# Patient Record
Sex: FEMALE | Race: WHITE | NOT HISPANIC OR LATINO | Employment: OTHER | URBAN - METROPOLITAN AREA
[De-identification: names, ages, dates, MRNs, and addresses within clinical notes are randomized per-mention and may not be internally consistent; named-entity substitution may affect disease eponyms.]

---

## 2018-12-17 ENCOUNTER — HOSPITAL ENCOUNTER (EMERGENCY)
Facility: HOSPITAL | Age: 63
Discharge: HOME | End: 2018-12-17
Attending: EMERGENCY MEDICINE | Admitting: EMERGENCY MEDICINE

## 2018-12-17 ENCOUNTER — APPOINTMENT (OUTPATIENT)
Dept: GENERAL RADIOLOGY | Facility: HOSPITAL | Age: 63
End: 2018-12-17

## 2018-12-17 ENCOUNTER — APPOINTMENT (OUTPATIENT)
Dept: CT IMAGING | Facility: HOSPITAL | Age: 63
End: 2018-12-17

## 2018-12-17 VITALS
TEMPERATURE: 97.8 F | HEIGHT: 63 IN | OXYGEN SATURATION: 95 % | DIASTOLIC BLOOD PRESSURE: 75 MMHG | BODY MASS INDEX: 28.35 KG/M2 | HEART RATE: 67 BPM | WEIGHT: 160 LBS | RESPIRATION RATE: 16 BRPM | SYSTOLIC BLOOD PRESSURE: 127 MMHG

## 2018-12-17 DIAGNOSIS — H81.09 MENIERE'S DISEASE, UNSPECIFIED LATERALITY: ICD-10-CM

## 2018-12-17 DIAGNOSIS — J20.9 BRONCHITIS WITH BRONCHOSPASM: Primary | ICD-10-CM

## 2018-12-17 LAB
ALBUMIN SERPL-MCNC: 4.55 G/DL (ref 3.2–4.8)
ALBUMIN/GLOB SERPL: 1.8 G/DL (ref 1.5–2.5)
ALP SERPL-CCNC: 54 U/L (ref 25–100)
ALT SERPL W P-5'-P-CCNC: 23 U/L (ref 7–40)
ANION GAP SERPL CALCULATED.3IONS-SCNC: 9 MMOL/L (ref 3–11)
AST SERPL-CCNC: 17 U/L (ref 0–33)
BASOPHILS # BLD AUTO: 0.05 10*3/MM3 (ref 0–0.2)
BASOPHILS NFR BLD AUTO: 0.4 % (ref 0–1)
BILIRUB SERPL-MCNC: 0.3 MG/DL (ref 0.3–1.2)
BNP SERPL-MCNC: 54 PG/ML (ref 0–100)
BUN BLD-MCNC: 20 MG/DL (ref 9–23)
BUN/CREAT SERPL: 29.9 (ref 7–25)
CALCIUM SPEC-SCNC: 9.4 MG/DL (ref 8.7–10.4)
CHLORIDE SERPL-SCNC: 104 MMOL/L (ref 99–109)
CO2 SERPL-SCNC: 25 MMOL/L (ref 20–31)
CREAT BLD-MCNC: 0.67 MG/DL (ref 0.6–1.3)
DEPRECATED RDW RBC AUTO: 41.2 FL (ref 37–54)
EOSINOPHIL # BLD AUTO: 0.01 10*3/MM3 (ref 0–0.3)
EOSINOPHIL NFR BLD AUTO: 0.1 % (ref 0–3)
ERYTHROCYTE [DISTWIDTH] IN BLOOD BY AUTOMATED COUNT: 12.2 % (ref 11.3–14.5)
GFR SERPL CREATININE-BSD FRML MDRD: 89 ML/MIN/1.73
GLOBULIN UR ELPH-MCNC: 2.6 GM/DL
GLUCOSE BLD-MCNC: 97 MG/DL (ref 70–100)
HCT VFR BLD AUTO: 40.3 % (ref 34.5–44)
HGB BLD-MCNC: 13 G/DL (ref 11.5–15.5)
HOLD SPECIMEN: NORMAL
HOLD SPECIMEN: NORMAL
IMM GRANULOCYTES # BLD: 0.1 10*3/MM3 (ref 0–0.03)
IMM GRANULOCYTES NFR BLD: 0.8 % (ref 0–0.6)
LYMPHOCYTES # BLD AUTO: 2.72 10*3/MM3 (ref 0.6–4.8)
LYMPHOCYTES NFR BLD AUTO: 20.8 % (ref 24–44)
MCH RBC QN AUTO: 30.2 PG (ref 27–31)
MCHC RBC AUTO-ENTMCNC: 32.3 G/DL (ref 32–36)
MCV RBC AUTO: 93.7 FL (ref 80–99)
MONOCYTES # BLD AUTO: 0.85 10*3/MM3 (ref 0–1)
MONOCYTES NFR BLD AUTO: 6.5 % (ref 0–12)
NEUTROPHILS # BLD AUTO: 9.44 10*3/MM3 (ref 1.5–8.3)
NEUTROPHILS NFR BLD AUTO: 72.2 % (ref 41–71)
PLATELET # BLD AUTO: 333 10*3/MM3 (ref 150–450)
PMV BLD AUTO: 11 FL (ref 6–12)
POTASSIUM BLD-SCNC: 4.4 MMOL/L (ref 3.5–5.5)
PROT SERPL-MCNC: 7.1 G/DL (ref 5.7–8.2)
RBC # BLD AUTO: 4.3 10*6/MM3 (ref 3.89–5.14)
SODIUM BLD-SCNC: 138 MMOL/L (ref 132–146)
TROPONIN I SERPL-MCNC: 0 NG/ML (ref 0–0.07)
WBC NRBC COR # BLD: 13.07 10*3/MM3 (ref 3.5–10.8)
WHOLE BLOOD HOLD SPECIMEN: NORMAL
WHOLE BLOOD HOLD SPECIMEN: NORMAL

## 2018-12-17 PROCEDURE — 0 IOPAMIDOL PER 1 ML: Performed by: EMERGENCY MEDICINE

## 2018-12-17 PROCEDURE — 80053 COMPREHEN METABOLIC PANEL: CPT | Performed by: EMERGENCY MEDICINE

## 2018-12-17 PROCEDURE — 93005 ELECTROCARDIOGRAM TRACING: CPT

## 2018-12-17 PROCEDURE — 84484 ASSAY OF TROPONIN QUANT: CPT

## 2018-12-17 PROCEDURE — 99284 EMERGENCY DEPT VISIT MOD MDM: CPT

## 2018-12-17 PROCEDURE — 93005 ELECTROCARDIOGRAM TRACING: CPT | Performed by: EMERGENCY MEDICINE

## 2018-12-17 PROCEDURE — 71045 X-RAY EXAM CHEST 1 VIEW: CPT

## 2018-12-17 PROCEDURE — 85025 COMPLETE CBC W/AUTO DIFF WBC: CPT

## 2018-12-17 PROCEDURE — 83880 ASSAY OF NATRIURETIC PEPTIDE: CPT | Performed by: EMERGENCY MEDICINE

## 2018-12-17 PROCEDURE — 71275 CT ANGIOGRAPHY CHEST: CPT

## 2018-12-17 PROCEDURE — 94640 AIRWAY INHALATION TREATMENT: CPT

## 2018-12-17 RX ORDER — IPRATROPIUM BROMIDE AND ALBUTEROL SULFATE 2.5; .5 MG/3ML; MG/3ML
3 SOLUTION RESPIRATORY (INHALATION) ONCE
Status: COMPLETED | OUTPATIENT
Start: 2018-12-17 | End: 2018-12-17

## 2018-12-17 RX ORDER — SODIUM CHLORIDE 0.9 % (FLUSH) 0.9 %
10 SYRINGE (ML) INJECTION AS NEEDED
Status: DISCONTINUED | OUTPATIENT
Start: 2018-12-17 | End: 2018-12-17 | Stop reason: HOSPADM

## 2018-12-17 RX ADMIN — IOPAMIDOL 80 ML: 755 INJECTION, SOLUTION INTRAVENOUS at 13:30

## 2018-12-17 RX ADMIN — IPRATROPIUM BROMIDE AND ALBUTEROL SULFATE 3 ML: 2.5; .5 SOLUTION RESPIRATORY (INHALATION) at 12:59

## 2018-12-17 NOTE — ED PROVIDER NOTES
Jonathan Barriga is a 63 y.o. female who presents to the emergency department with complaints of SOA that started 6 days ago that has increased since onset. The patient states that she developed a cough and was seen at the clinic where she was prescribed Doxycycline, Albuterol inhaler, and steroids. She has noticed that the Albuterol inhaler has improved her coughing. The patient mentions that she has a sore throat. She has experienced tightness in her chest from her SOA that started yesterday that has been constant since onset. She had a similar episode of SOA 5 months ago where she was prescribed Doxycycline that she feels did not help at that time. She has a PMHx of uterine cancer that she was diagnosed with in 2015, and had surgery to remove the cancer. She did not receive any chemotherapy, or radiation. She denies any exposure to fumes, but does report a history of second hand smoke exposure. She denies any chest wall tenderness, bilateral lower extremity edema, fever, nausea, and any other acute symptoms at this time.         History provided by:  Patient  Shortness of Breath   Severity:  Moderate  Onset quality:  Gradual  Duration:  6 days  Timing:  Constant  Progression:  Worsening  Chronicity:  New  Relieved by:  Inhaler  Worsened by:  Emotional stress and coughing  Associated symptoms: chest pain, cough and sore throat    Associated symptoms: no fever    Risk factors: hx of cancer and obesity        Review of Systems   Constitutional: Negative for fever.   HENT: Positive for sore throat.    Respiratory: Positive for cough and shortness of breath.    Cardiovascular: Positive for chest pain.   Gastrointestinal: Negative for nausea.   Musculoskeletal:        No edema    All other systems reviewed and are negative.      Past Medical History:   Diagnosis Date   • Disease of thyroid gland    • History of uterine cancer        Allergies   Allergen Reactions   • Penicillins Hives       Past Surgical  History:   Procedure Laterality Date   • TOTAL ABDOMINAL HYSTERECTOMY WITH SALPINGO OOPHORECTOMY  2015       History reviewed. No pertinent family history.    Social History     Socioeconomic History   • Marital status:      Spouse name: Not on file   • Number of children: Not on file   • Years of education: Not on file   • Highest education level: Not on file   Tobacco Use   • Smoking status: Never Smoker   • Smokeless tobacco: Never Used   • Tobacco comment: second hand smoke exposure   Substance and Sexual Activity   • Alcohol use: No   • Drug use: No         Objective   Physical Exam   Constitutional: She is oriented to person, place, and time. She appears well-developed and well-nourished. No distress.   The patient is a pleasant emotional female with a slightly high pitched voice.    HENT:   Head: Normocephalic and atraumatic.   Eyes: Conjunctivae are normal. No scleral icterus.   Neck: Normal range of motion. Neck supple.   Cardiovascular: Normal rate, regular rhythm and normal heart sounds.   No murmur heard.  Pulmonary/Chest: Effort normal and breath sounds normal. No respiratory distress. She has no wheezes. She has no rales.   Abdominal: Soft. There is no tenderness. There is no guarding.   Musculoskeletal: Normal range of motion.   Neurological: She is alert and oriented to person, place, and time.   Skin: Skin is warm and dry. She is not diaphoretic.   Psychiatric: She has a normal mood and affect. Her behavior is normal.   Nursing note and vitals reviewed.      Procedures         ED Course  ED Course as of Dec 17 1547   Mon Dec 17, 2018   1412 She feels remarkably better after the Duoneb.  Her voice is even improved.  Will add ipratropium to her albuterol inhaler.  Spacer device.  Increase albuterol to qid from bid.  Can stop prednisone.  [LI]      ED Course User Index  [LI] Bijan Chavis MD     Recent Results (from the past 24 hour(s))   Comprehensive Metabolic Panel    Collection Time:  12/17/18 11:59 AM   Result Value Ref Range    Glucose 97 70 - 100 mg/dL    BUN 20 9 - 23 mg/dL    Creatinine 0.67 0.60 - 1.30 mg/dL    Sodium 138 132 - 146 mmol/L    Potassium 4.4 3.5 - 5.5 mmol/L    Chloride 104 99 - 109 mmol/L    CO2 25.0 20.0 - 31.0 mmol/L    Calcium 9.4 8.7 - 10.4 mg/dL    Total Protein 7.1 5.7 - 8.2 g/dL    Albumin 4.55 3.20 - 4.80 g/dL    ALT (SGPT) 23 7 - 40 U/L    AST (SGOT) 17 0 - 33 U/L    Alkaline Phosphatase 54 25 - 100 U/L    Total Bilirubin 0.3 0.3 - 1.2 mg/dL    eGFR Non African Amer 89 >60 mL/min/1.73    Globulin 2.6 gm/dL    A/G Ratio 1.8 1.5 - 2.5 g/dL    BUN/Creatinine Ratio 29.9 (H) 7.0 - 25.0    Anion Gap 9.0 3.0 - 11.0 mmol/L   BNP    Collection Time: 12/17/18 11:59 AM   Result Value Ref Range    BNP 54.0 0.0 - 100.0 pg/mL   Light Blue Top    Collection Time: 12/17/18 11:59 AM   Result Value Ref Range    Extra Tube hold for add-on    Green Top (Gel)    Collection Time: 12/17/18 11:59 AM   Result Value Ref Range    Extra Tube Hold for add-ons.    Lavender Top    Collection Time: 12/17/18 11:59 AM   Result Value Ref Range    Extra Tube hold for add-on    Gold Top - SST    Collection Time: 12/17/18 11:59 AM   Result Value Ref Range    Extra Tube Hold for add-ons.    CBC Auto Differential    Collection Time: 12/17/18 11:59 AM   Result Value Ref Range    WBC 13.07 (H) 3.50 - 10.80 10*3/mm3    RBC 4.30 3.89 - 5.14 10*6/mm3    Hemoglobin 13.0 11.5 - 15.5 g/dL    Hematocrit 40.3 34.5 - 44.0 %    MCV 93.7 80.0 - 99.0 fL    MCH 30.2 27.0 - 31.0 pg    MCHC 32.3 32.0 - 36.0 g/dL    RDW 12.2 11.3 - 14.5 %    RDW-SD 41.2 37.0 - 54.0 fl    MPV 11.0 6.0 - 12.0 fL    Platelets 333 150 - 450 10*3/mm3    Neutrophil % 72.2 (H) 41.0 - 71.0 %    Lymphocyte % 20.8 (L) 24.0 - 44.0 %    Monocyte % 6.5 0.0 - 12.0 %    Eosinophil % 0.1 0.0 - 3.0 %    Basophil % 0.4 0.0 - 1.0 %    Immature Grans % 0.8 (H) 0.0 - 0.6 %    Neutrophils, Absolute 9.44 (H) 1.50 - 8.30 10*3/mm3    Lymphocytes, Absolute 2.72 0.60  - 4.80 10*3/mm3    Monocytes, Absolute 0.85 0.00 - 1.00 10*3/mm3    Eosinophils, Absolute 0.01 0.00 - 0.30 10*3/mm3    Basophils, Absolute 0.05 0.00 - 0.20 10*3/mm3    Immature Grans, Absolute 0.10 (H) 0.00 - 0.03 10*3/mm3   POC Troponin, Rapid    Collection Time: 12/17/18 12:05 PM   Result Value Ref Range    Troponin I 0.00 0.00 - 0.07 ng/mL     Note: In addition to lab results from this visit, the labs listed above may include labs taken at another facility or during a different encounter within the last 24 hours. Please correlate lab times with ED admission and discharge times for further clarification of the services performed during this visit.    XR Chest 1 View   Final Result   No acute cardiopulmonary disease.       D:  12/17/2018   E:  12/17/2018       This report was finalized on 12/17/2018 3:23 PM by Dr. Glory Chaidez MD.          CT Angiogram Chest With & Without Contrast    (Results Pending)     Vitals:    12/17/18 1230 12/17/18 1259 12/17/18 1300 12/17/18 1419   BP: 147/81  114/74 127/75   BP Location:       Pulse: 75 61 61 67   Resp:    16   Temp:       TempSrc:       SpO2: 96% 99% 99% 95%   Weight:       Height:         Medications   sodium chloride 0.9 % flush 10 mL (not administered)   ipratropium-albuterol (DUO-NEB) nebulizer solution 3 mL (3 mL Nebulization Given 12/17/18 1259)   iopamidol (ISOVUE-370) 76 % injection 100 mL (80 mL Intravenous Given 12/17/18 1330)     ECG/EMG Results (last 24 hours)     Procedure Component Value Units Date/Time    ECG 12 Lead [[id number]] Collected:  12/17/18 1158     Updated:  12/17/18 1219    Narrative:       Test Reason : SOA Protocol  Blood Pressure : **/** mmHG  Vent. Rate : 065 BPM     Atrial Rate : 065 BPM     P-R Int : 142 ms          QRS Dur : 082 ms      QT Int : 370 ms       P-R-T Axes : 034 033 034 degrees     QTc Int : 384 ms    Normal sinus rhythm  Normal ECG  No previous ECGs available  Confirmed by HILARY JACOBS MD ([id number]) on 12/17/2018  12:19:06 PM    Referred By:  EDMD           Confirmed By:HILARY CHAVIS MD                        [facility]    Final diagnoses:   Bronchitis with bronchospasm   Meniere's disease, unspecified laterality       Documentation assistance provided by elvia Torres.  Information recorded by the scribe was done at my direction and has been verified and validated by me.     Jessica Torres  12/17/18 9139       Hilary Chavis MD  12/17/18 5558

## 2018-12-17 NOTE — DISCHARGE INSTRUCTIONS
Follow up with Dr. Salvador for Meniere's Disease     Use albuterol 4 times a day along with the ipratropium.  Gradually decrease use after breathing returns to normal.     Return to ER if any worsening or new symptoms occur.     Follow up with one of the [facility] Primary Care Providers below to setup primary care. If you need assistance coordinating a primary care appointment with a [facility] Primary Care Provider, please contact the Primary Care Coordinators at [phone] for appointment scheduling.    [facility], Primary Care   [address] , [address]   [place] [address]  [phone]    [facility] Internal Medicine & Endocrinology  [address], [address]  [place] [address] [phone]    [facility] Family Medicine  [address], [address]   [place], [place] [id number] [phone]    [facility] Primary Care  [address], [address]  [place] [address]  [phone]    [facility], Primary Care,   [address], [address]   [place] [address]  [phone]    [facility]  [address], [address]  [place], [place] [address] [phone]    [facility], Primary Care  [address], [address]  [place], [place] [id number] [phone]    [facility] Internal Medicine & Pediatrics  [address], [address]   [place] [address] [phone]    [facility], Primary Care  [address] [place] [address] [phone]      [facility]  [address], [address]   [place] [address] [phone]    [facility] Family Medicine  [address] [place] [address] [place] [address] [phone]

## 2019-03-05 ENCOUNTER — HOSPITAL ENCOUNTER (EMERGENCY)
Facility: HOSPITAL | Age: 64
Discharge: HOME | End: 2019-03-05
Attending: EMERGENCY MEDICINE | Admitting: EMERGENCY MEDICINE

## 2019-03-05 ENCOUNTER — APPOINTMENT (OUTPATIENT)
Dept: CT IMAGING | Facility: HOSPITAL | Age: 64
End: 2019-03-05

## 2019-03-05 VITALS
RESPIRATION RATE: 18 BRPM | SYSTOLIC BLOOD PRESSURE: 130 MMHG | TEMPERATURE: 98.1 F | WEIGHT: 150 LBS | HEIGHT: 63 IN | DIASTOLIC BLOOD PRESSURE: 78 MMHG | OXYGEN SATURATION: 96 % | BODY MASS INDEX: 26.58 KG/M2 | HEART RATE: 78 BPM

## 2019-03-05 DIAGNOSIS — K57.32 SIGMOID DIVERTICULITIS: Primary | ICD-10-CM

## 2019-03-05 LAB
ALBUMIN SERPL-MCNC: 4.45 G/DL (ref 3.2–4.8)
ALBUMIN/GLOB SERPL: 1.6 G/DL (ref 1.5–2.5)
ALP SERPL-CCNC: 65 U/L (ref 25–100)
ALT SERPL W P-5'-P-CCNC: 14 U/L (ref 7–40)
ANION GAP SERPL CALCULATED.3IONS-SCNC: 7 MMOL/L (ref 3–11)
AST SERPL-CCNC: 14 U/L (ref 0–33)
BASOPHILS # BLD AUTO: 0.03 10*3/MM3 (ref 0–0.2)
BASOPHILS NFR BLD AUTO: 0.3 % (ref 0–1)
BILIRUB SERPL-MCNC: 0.4 MG/DL (ref 0.3–1.2)
BILIRUB UR QL STRIP: NEGATIVE
BUN BLD-MCNC: 14 MG/DL (ref 9–23)
BUN/CREAT SERPL: 17.9 (ref 7–25)
CALCIUM SPEC-SCNC: 9.5 MG/DL (ref 8.7–10.4)
CHLORIDE SERPL-SCNC: 102 MMOL/L (ref 99–109)
CLARITY UR: CLEAR
CO2 SERPL-SCNC: 28 MMOL/L (ref 20–31)
COLOR UR: YELLOW
CREAT BLD-MCNC: 0.78 MG/DL (ref 0.6–1.3)
D-LACTATE SERPL-SCNC: 1.1 MMOL/L (ref 0.5–2)
DEPRECATED RDW RBC AUTO: 41.4 FL (ref 37–54)
EOSINOPHIL # BLD AUTO: 0.24 10*3/MM3 (ref 0–0.3)
EOSINOPHIL NFR BLD AUTO: 2.3 % (ref 0–3)
ERYTHROCYTE [DISTWIDTH] IN BLOOD BY AUTOMATED COUNT: 12.2 % (ref 11.3–14.5)
GFR SERPL CREATININE-BSD FRML MDRD: 75 ML/MIN/1.73
GLOBULIN UR ELPH-MCNC: 2.8 GM/DL
GLUCOSE BLD-MCNC: 89 MG/DL (ref 70–100)
GLUCOSE UR STRIP-MCNC: NEGATIVE MG/DL
HCT VFR BLD AUTO: 39.4 % (ref 34.5–44)
HGB BLD-MCNC: 12.6 G/DL (ref 11.5–15.5)
HGB UR QL STRIP.AUTO: NEGATIVE
HOLD SPECIMEN: NORMAL
HOLD SPECIMEN: NORMAL
IMM GRANULOCYTES # BLD AUTO: 0.02 10*3/MM3 (ref 0–0.05)
IMM GRANULOCYTES NFR BLD AUTO: 0.2 % (ref 0–0.6)
KETONES UR QL STRIP: NEGATIVE
LEUKOCYTE ESTERASE UR QL STRIP.AUTO: NEGATIVE
LIPASE SERPL-CCNC: 32 U/L (ref 6–51)
LYMPHOCYTES # BLD AUTO: 3.42 10*3/MM3 (ref 0.6–4.8)
LYMPHOCYTES NFR BLD AUTO: 32.7 % (ref 24–44)
MCH RBC QN AUTO: 29.9 PG (ref 27–31)
MCHC RBC AUTO-ENTMCNC: 32 G/DL (ref 32–36)
MCV RBC AUTO: 93.4 FL (ref 80–99)
MONOCYTES # BLD AUTO: 0.96 10*3/MM3 (ref 0–1)
MONOCYTES NFR BLD AUTO: 9.2 % (ref 0–12)
NEUTROPHILS # BLD AUTO: 5.81 10*3/MM3 (ref 1.5–8.3)
NEUTROPHILS NFR BLD AUTO: 55.5 % (ref 41–71)
NITRITE UR QL STRIP: NEGATIVE
PH UR STRIP.AUTO: 8.5 [PH] (ref 5–8)
PLATELET # BLD AUTO: 274 10*3/MM3 (ref 150–450)
PMV BLD AUTO: 11.6 FL (ref 6–12)
POTASSIUM BLD-SCNC: 3.9 MMOL/L (ref 3.5–5.5)
PROT SERPL-MCNC: 7.2 G/DL (ref 5.7–8.2)
PROT UR QL STRIP: NEGATIVE
RBC # BLD AUTO: 4.22 10*6/MM3 (ref 3.89–5.14)
SODIUM BLD-SCNC: 137 MMOL/L (ref 132–146)
SP GR UR STRIP: 1.01 (ref 1–1.03)
UROBILINOGEN UR QL STRIP: ABNORMAL
WBC NRBC COR # BLD: 10.46 10*3/MM3 (ref 3.5–10.8)
WHOLE BLOOD HOLD SPECIMEN: NORMAL
WHOLE BLOOD HOLD SPECIMEN: NORMAL

## 2019-03-05 PROCEDURE — 83605 ASSAY OF LACTIC ACID: CPT | Performed by: EMERGENCY MEDICINE

## 2019-03-05 PROCEDURE — 80053 COMPREHEN METABOLIC PANEL: CPT | Performed by: EMERGENCY MEDICINE

## 2019-03-05 PROCEDURE — 25010000002 ONDANSETRON PER 1 MG: Performed by: EMERGENCY MEDICINE

## 2019-03-05 PROCEDURE — 85025 COMPLETE CBC W/AUTO DIFF WBC: CPT | Performed by: EMERGENCY MEDICINE

## 2019-03-05 PROCEDURE — 25010000002 IOPAMIDOL 61 % SOLUTION: Performed by: EMERGENCY MEDICINE

## 2019-03-05 PROCEDURE — 74177 CT ABD & PELVIS W/CONTRAST: CPT

## 2019-03-05 PROCEDURE — 83690 ASSAY OF LIPASE: CPT | Performed by: EMERGENCY MEDICINE

## 2019-03-05 PROCEDURE — 99284 EMERGENCY DEPT VISIT MOD MDM: CPT

## 2019-03-05 PROCEDURE — 96361 HYDRATE IV INFUSION ADD-ON: CPT

## 2019-03-05 PROCEDURE — 81003 URINALYSIS AUTO W/O SCOPE: CPT | Performed by: EMERGENCY MEDICINE

## 2019-03-05 PROCEDURE — 25010000002 HYDROMORPHONE PER 4 MG: Performed by: EMERGENCY MEDICINE

## 2019-03-05 PROCEDURE — 96374 THER/PROPH/DIAG INJ IV PUSH: CPT

## 2019-03-05 RX ORDER — FOLIC ACID 0.8 MG
TABLET ORAL
COMMUNITY
End: 2019-04-22

## 2019-03-05 RX ORDER — HYDROCODONE BITARTRATE AND ACETAMINOPHEN 5; 325 MG/1; MG/1
1 TABLET ORAL EVERY 6 HOURS PRN
Qty: 12 TABLET | Refills: 0 | Status: SHIPPED | OUTPATIENT
Start: 2019-03-05 | End: 2019-04-22

## 2019-03-05 RX ORDER — LEVOFLOXACIN 5 MG/ML
500 INJECTION, SOLUTION INTRAVENOUS ONCE
Status: DISCONTINUED | OUTPATIENT
Start: 2019-03-05 | End: 2019-03-05

## 2019-03-05 RX ORDER — CIPROFLOXACIN 500 MG/1
500 TABLET, FILM COATED ORAL 2 TIMES DAILY
Qty: 20 TABLET | Refills: 0 | Status: SHIPPED | OUTPATIENT
Start: 2019-03-05 | End: 2019-04-22

## 2019-03-05 RX ORDER — METRONIDAZOLE 500 MG/1
500 TABLET ORAL ONCE
Status: COMPLETED | OUTPATIENT
Start: 2019-03-05 | End: 2019-03-05

## 2019-03-05 RX ORDER — METRONIDAZOLE 500 MG/1
500 TABLET ORAL 2 TIMES DAILY
Qty: 14 TABLET | Refills: 0 | Status: SHIPPED | OUTPATIENT
Start: 2019-03-05 | End: 2019-04-22

## 2019-03-05 RX ORDER — ONDANSETRON 4 MG/1
4 TABLET, ORALLY DISINTEGRATING ORAL EVERY 8 HOURS PRN
Qty: 14 TABLET | Refills: 0 | Status: SHIPPED | OUTPATIENT
Start: 2019-03-05 | End: 2019-04-22

## 2019-03-05 RX ORDER — ONDANSETRON 2 MG/ML
4 INJECTION INTRAMUSCULAR; INTRAVENOUS ONCE
Status: COMPLETED | OUTPATIENT
Start: 2019-03-05 | End: 2019-03-05

## 2019-03-05 RX ORDER — LEVOFLOXACIN 500 MG/1
500 TABLET, FILM COATED ORAL ONCE
Status: COMPLETED | OUTPATIENT
Start: 2019-03-05 | End: 2019-03-05

## 2019-03-05 RX ORDER — HYDROMORPHONE HYDROCHLORIDE 1 MG/ML
0.5 INJECTION, SOLUTION INTRAMUSCULAR; INTRAVENOUS; SUBCUTANEOUS ONCE
Status: DISCONTINUED | OUTPATIENT
Start: 2019-03-05 | End: 2019-03-05 | Stop reason: HOSPADM

## 2019-03-05 RX ORDER — SODIUM CHLORIDE 0.9 % (FLUSH) 0.9 %
10 SYRINGE (ML) INJECTION AS NEEDED
Status: DISCONTINUED | OUTPATIENT
Start: 2019-03-05 | End: 2019-03-05 | Stop reason: HOSPADM

## 2019-03-05 RX ADMIN — METRONIDAZOLE 500 MG: 500 TABLET ORAL at 16:01

## 2019-03-05 RX ADMIN — ONDANSETRON 4 MG: 2 INJECTION INTRAMUSCULAR; INTRAVENOUS at 12:46

## 2019-03-05 RX ADMIN — SODIUM CHLORIDE 1000 ML: 9 INJECTION, SOLUTION INTRAVENOUS at 12:47

## 2019-03-05 RX ADMIN — IOPAMIDOL 95 ML: 612 INJECTION, SOLUTION INTRAVENOUS at 13:11

## 2019-03-05 RX ADMIN — LEVOFLOXACIN 500 MG: 500 TABLET, FILM COATED ORAL at 16:02

## 2019-03-05 NOTE — DISCHARGE INSTRUCTIONS
Medications as directed.  Follow-up with colorectal surgery.  Call tomorrow for an appointment within a few days.  Medications as directed.  Return to the emergency department as needed for worsening symptoms or concerns including, but are not limited to: Fever, intractable pain, or intractable vomiting.  Thank you

## 2019-03-05 NOTE — ED PROVIDER NOTES
Subjective   Iliana Barriga is a 63 y.o.female who presents to the emergency department with complaints of abdominal pain. In 2015 she was diagnosed with uterine cancer and underwent a total hysterectomy. At the same time she had a bladder neck suspension. She feels that her bladder has fallen since then because she has had ongoing fullness in her abdomen for about a year which she has been intermittently treating with simethicone. About a week ago she felt two sharp, shooting pains in her lumbar back while passing a bowel movement. Her back pain resolved although a few days later she started having lower abdominal pain and trouble with constipation. She did finally have a large, liquid bowel movement after using several fleet enemas and glycerin suppositories but is still having daily lower abdominal pain. She last passed liquid stool this morning at 1000. There are no other acute complaints at this time.        History provided by:  Patient  Abdominal Pain   Pain location:  RLQ, LLQ and suprapubic  Pain quality: aching    Pain radiates to:  Does not radiate  Pain severity:  Moderate  Onset quality:  Sudden  Duration:  4 days  Timing:  Constant  Progression:  Unchanged  Chronicity:  New  Relieved by:  Nothing  Worsened by:  Nothing  Ineffective treatments:  Bowel activity  Associated symptoms: constipation    Associated symptoms: no hematochezia and no melena        Review of Systems   Gastrointestinal: Positive for abdominal pain and constipation. Negative for hematochezia and melena.   Musculoskeletal: Negative for back pain.   All other systems reviewed and are negative.      Past Medical History:   Diagnosis Date   • Disease of thyroid gland    • History of uterine cancer        Allergies   Allergen Reactions   • Penicillins Hives       Past Surgical History:   Procedure Laterality Date   • BLADDER SUSPENSION     • TOTAL ABDOMINAL HYSTERECTOMY WITH SALPINGO OOPHORECTOMY  2015       History reviewed. No  pertinent family history.    Social History     Socioeconomic History   • Marital status:      Spouse name: Not on file   • Number of children: Not on file   • Years of education: Not on file   • Highest education level: Not on file   Tobacco Use   • Smoking status: Never Smoker   • Smokeless tobacco: Never Used   Substance and Sexual Activity   • Alcohol use: No   • Drug use: No         Objective   Physical Exam   Constitutional: She is oriented to person, place, and time. She appears well-developed and well-nourished. No distress.   HENT:   Head: Normocephalic and atraumatic.   Eyes: Conjunctivae are normal. No scleral icterus.   Neck: Normal range of motion. Neck supple.   Cardiovascular: Normal rate, regular rhythm and normal heart sounds.   No murmur heard.  Pulmonary/Chest: Effort normal and breath sounds normal. No respiratory distress.   Abdominal: Soft. Normal appearance and bowel sounds are normal. There is tenderness in the right lower quadrant, suprapubic area and left lower quadrant.   She has mild diffuse tenderness in the lower abdomen.   Musculoskeletal: She exhibits no edema.   Neurological: She is alert and oriented to person, place, and time.   Skin: Skin is warm and dry. No erythema.   Psychiatric: She has a normal mood and affect. Her behavior is normal.   Nursing note and vitals reviewed.      Procedures         ED Course  ED Course as of Mar 05 1534   Tue Mar 05, 2019   1510 Patient has a normal white count.  Her pain is controlled with medication.  CT is negative for perforation.  Will give first doses of antibiotic measures here and then discharged to outpatient treatment referral.  She concurs with outpatient plan of care and understands parameters for concern that would warrant return to the emergency department.  [ML]      ED Course User Index  [ML] Princess Arrington APRN     Recent Results (from the past 24 hour(s))   Comprehensive Metabolic Panel    Collection Time: 03/05/19  11:57 AM   Result Value Ref Range    Glucose 89 70 - 100 mg/dL    BUN 14 9 - 23 mg/dL    Creatinine 0.78 0.60 - 1.30 mg/dL    Sodium 137 132 - 146 mmol/L    Potassium 3.9 3.5 - 5.5 mmol/L    Chloride 102 99 - 109 mmol/L    CO2 28.0 20.0 - 31.0 mmol/L    Calcium 9.5 8.7 - 10.4 mg/dL    Total Protein 7.2 5.7 - 8.2 g/dL    Albumin 4.45 3.20 - 4.80 g/dL    ALT (SGPT) 14 7 - 40 U/L    AST (SGOT) 14 0 - 33 U/L    Alkaline Phosphatase 65 25 - 100 U/L    Total Bilirubin 0.4 0.3 - 1.2 mg/dL    eGFR Non African Amer 75 >60 mL/min/1.73    Globulin 2.8 gm/dL    A/G Ratio 1.6 1.5 - 2.5 g/dL    BUN/Creatinine Ratio 17.9 7.0 - 25.0    Anion Gap 7.0 3.0 - 11.0 mmol/L   Lipase    Collection Time: 03/05/19 11:57 AM   Result Value Ref Range    Lipase 32 6 - 51 U/L   Lactic Acid, Plasma    Collection Time: 03/05/19 11:57 AM   Result Value Ref Range    Lactate 1.1 0.5 - 2.0 mmol/L   Light Blue Top    Collection Time: 03/05/19 11:57 AM   Result Value Ref Range    Extra Tube hold for add-on    Green Top (Gel)    Collection Time: 03/05/19 11:57 AM   Result Value Ref Range    Extra Tube Hold for add-ons.    Lavender Top    Collection Time: 03/05/19 11:57 AM   Result Value Ref Range    Extra Tube hold for add-on    Gold Top - SST    Collection Time: 03/05/19 11:57 AM   Result Value Ref Range    Extra Tube Hold for add-ons.    CBC Auto Differential    Collection Time: 03/05/19 11:57 AM   Result Value Ref Range    WBC 10.46 3.50 - 10.80 10*3/mm3    RBC 4.22 3.89 - 5.14 10*6/mm3    Hemoglobin 12.6 11.5 - 15.5 g/dL    Hematocrit 39.4 34.5 - 44.0 %    MCV 93.4 80.0 - 99.0 fL    MCH 29.9 27.0 - 31.0 pg    MCHC 32.0 32.0 - 36.0 g/dL    RDW 12.2 11.3 - 14.5 %    RDW-SD 41.4 37.0 - 54.0 fl    MPV 11.6 6.0 - 12.0 fL    Platelets 274 150 - 450 10*3/mm3    Neutrophil % 55.5 41.0 - 71.0 %    Lymphocyte % 32.7 24.0 - 44.0 %    Monocyte % 9.2 0.0 - 12.0 %    Eosinophil % 2.3 0.0 - 3.0 %    Basophil % 0.3 0.0 - 1.0 %    Immature Grans % 0.2 0.0 - 0.6 %     "Neutrophils, Absolute 5.81 1.50 - 8.30 10*3/mm3    Lymphocytes, Absolute 3.42 0.60 - 4.80 10*3/mm3    Monocytes, Absolute 0.96 0.00 - 1.00 10*3/mm3    Eosinophils, Absolute 0.24 0.00 - 0.30 10*3/mm3    Basophils, Absolute 0.03 0.00 - 0.20 10*3/mm3    Immature Grans, Absolute 0.02 0.00 - 0.05 10*3/mm3   Urinalysis With Microscopic If Indicated (No Culture) - Urine, Clean Catch    Collection Time: 03/05/19 11:58 AM   Result Value Ref Range    Color, UA Yellow Yellow, Straw    Appearance, UA Clear Clear    pH, UA 8.5 (H) 5.0 - 8.0    Specific Gravity, UA 1.011 1.001 - 1.030    Glucose, UA Negative Negative    Ketones, UA Negative Negative    Bilirubin, UA Negative Negative    Blood, UA Negative Negative    Protein, UA Negative Negative    Leuk Esterase, UA Negative Negative    Nitrite, UA Negative Negative    Urobilinogen, UA 0.2 E.U./dL 0.2 - 1.0 E.U./dL     Note: In addition to lab results from this visit, the labs listed above may include labs taken at another facility or during a different encounter within the last 24 hours. Please correlate lab times with ED admission and discharge times for further clarification of the services performed during this visit.    CT Abdomen Pelvis With Contrast   Preliminary Result   Marked inflammation in the distal sigmoid colon consistent   with acute diverticulitis.       D:  03/05/2019   E:  03/05/2019                    Vitals:    03/05/19 1135 03/05/19 1407   BP: 120/80 120/84   BP Location: Left arm Left arm   Patient Position: Sitting Lying   Pulse: 76 78   Resp: 18 18   Temp: 98.1 °F (36.7 °C) 98.1 °F (36.7 °C)   TempSrc: Oral Oral   SpO2: 95% 95%   Weight: 68 kg (150 lb)    Height: 160 cm (63\")      Medications   sodium chloride 0.9 % flush 10 mL (not administered)   HYDROmorphone (DILAUDID) injection 0.5 mg (0.5 mg Intravenous Not Given 3/5/19 1246)   levoFLOXacin (LEVAQUIN) 500 mg/100 mL D5W (premix) (LEVAQUIN) 500 mg (not administered)   metroNIDAZOLE (FLAGYL) tablet " 500 mg (not administered)   sodium chloride 0.9 % bolus 1,000 mL (1,000 mL Intravenous New Bag 3/5/19 1247)   ondansetron (ZOFRAN) injection 4 mg (4 mg Intravenous Given 3/5/19 1246)   iopamidol (ISOVUE-300) 61 % injection 100 mL (95 mL Intravenous Given 3/5/19 1311)     ECG/EMG Results (last 24 hours)     ** No results found for the last 24 hours. **        No orders to display                      MDM    Final diagnoses:   Sigmoid diverticulitis       Documentation assistance provided by elvia Jordan.  Information recorded by the elvia was done at my direction and has been verified and validated by me.     Юлия Jordan  03/05/19 1302       Princess Arrington APRN  03/05/19 8339

## 2019-03-25 ENCOUNTER — TRANSCRIBE ORDERS (OUTPATIENT)
Dept: ADMINISTRATIVE | Facility: HOSPITAL | Age: 64
End: 2019-03-25

## 2019-03-25 DIAGNOSIS — K57.92 DIVERTICULITIS: Primary | ICD-10-CM

## 2019-03-28 ENCOUNTER — HOSPITAL ENCOUNTER (OUTPATIENT)
Dept: GENERAL RADIOLOGY | Facility: HOSPITAL | Age: 64
Discharge: HOME | End: 2019-03-28
Admitting: COLON & RECTAL SURGERY

## 2019-03-28 DIAGNOSIS — K57.92 DIVERTICULITIS: ICD-10-CM

## 2019-03-28 PROCEDURE — 74270 X-RAY XM COLON 1CNTRST STD: CPT

## 2019-03-28 PROCEDURE — 0 DIATRIZOATE MEGLUMINE & SODIUM PER 1 ML: Performed by: COLON & RECTAL SURGERY

## 2019-03-28 RX ADMIN — DIATRIZOATE MEGLUMINE AND DIATRIZOATE SODIUM 480 ML: 660; 100 LIQUID ORAL; RECTAL at 11:11

## 2019-04-08 PROBLEM — Z01.419 WELL WOMAN EXAM: Status: ACTIVE | Noted: 2019-04-08

## 2019-04-22 ENCOUNTER — OFFICE VISIT (OUTPATIENT)
Dept: OBSTETRICS AND GYNECOLOGY | Facility: CLINIC | Age: 64
End: 2019-04-22

## 2019-04-22 VITALS
SYSTOLIC BLOOD PRESSURE: 124 MMHG | BODY MASS INDEX: 30.11 KG/M2 | WEIGHT: 170 LBS | DIASTOLIC BLOOD PRESSURE: 80 MMHG | RESPIRATION RATE: 14 BRPM

## 2019-04-22 DIAGNOSIS — N81.6 CYSTOCELE WITH RECTOCELE: Primary | ICD-10-CM

## 2019-04-22 DIAGNOSIS — N81.10 CYSTOCELE WITH RECTOCELE: Primary | ICD-10-CM

## 2019-04-22 PROCEDURE — 99203 OFFICE O/P NEW LOW 30 MIN: CPT | Performed by: OBSTETRICS & GYNECOLOGY

## 2019-04-22 NOTE — PROGRESS NOTES
Subjective   Chief Complaint   Patient presents with   • [facility]     Iliana Leonela Barriga is a 63 y.o. year old  who is post-menopausal.  She is S/P hysterectomy presenting to be seen concern for a problem.  She is returning from being away because her kids are in the region.  Gives a history of prior uterine cancer treated surgically.  This was in [place].  She was prescribed hormone replacement therapy prior to the hysterectomy.  After the hysterectomy she was told hormones could be continued.  At the time of the hysterectomy bladder tack was also done by a urologist in the [place] area.  It was done at the same operation.    She has been having issues with bowel function.  Problems started in early 2019.  She has no trouble emptying the colon but she is noticed thin, ribbonlike stool.      She had a recent colonoscopy done at [facility], [place], operated by [facility].  This was done by Dr. Brown.  She had diverticulosis seen.  There is no colorectal polyps, adenomas or cancer seen.  She also has what she perceives to be a prolapsed bladder.  Every time she tries to defecate the bladder extrudes through the opening of the vagina.  It is causing her trouble.    Mammogram was done within the last 18 months.  It was done down in [place].  It was normal per patient's report.    SEXUAL Hx:  She is not currently sexually active in the past 1 year.  In the past year there there has been NO new sexual partners.    Condoms are not needed because she is not sexually active.  She would not like to be screened for STD's at today's exam.  Gwynn is painful: n/a  HEALTH Hx:  She exercises regularly: yes.  She wears her seat belt: yes.  She has concerns about domestic violence: no.  OTHER THINGS SHE WANTS TO DISCUSS TODAY:  Nothing else    The following portions of the patient's history were reviewed and updated as appropriate:problem list, current medications, allergies, past family history, past medical history,  past social history and past surgical history.    Social History    Tobacco Use      Smoking status: Never Smoker      Smokeless tobacco: Never Used    Review of Systems  Constitutional POS: nothing reported    NEG: anorexia or night sweats   Genitourinary POS: She does have trouble emptying the bladder.  She has to lean forward or shift to one side in order to more fully empty.    NEG: dysuria or hematuria   Gastointestinal POS: nothing reported    NEG: bloating, change in bowel habits, melena or reflux symptoms   Integument POS: nothing reported    NEG: moles that are changing in size, shape, color or rashes   Breast POS: nothing reported and had normal mammogram in the past year - results are not in record for review    NEG: persistent breast lump, skin dimpling or nipple discharge        Objective   /80   Resp 14   Wt 77.1 kg (170 lb)   LMP  (LMP Unknown)   Breastfeeding? No   BMI 30.11 kg/m²     General:  well developed; well nourished  no acute distress   Skin:  Not performed.   Thyroid: not examined   Breasts:  Not performed.   Abdomen: soft, non-tender; no masses  no umbilical or inguinal hernias are present  no hepato-splenomegaly   Pelvis: Clinical staff was present for exam  External genitalia:  normal appearance of the external genitalia including Bartholin's and [place]'s glands.  :  urethral meatus normal;  Vaginal:  normal pink mucosa without prolapse or lesions.  Cervix:  absent.  Uterus:  absent.  Adnexa:  absent, bilateral.  Rectal:  digital rectal exam not performed; anus visually normal appearing.  Cystocele GRADE 4  Rectocele GRADE 2  Vaginal vault prolapse GRADE 2        Assessment   1. Pelvic organ prolapse - this is a new finding.  It is symptomatic and affecting her quality of life.  2. H/o endometrial cancer by history - doubt this diagnosis given the use of ERT  3. Menopausal female currently on HRT - without significant symptoms affecting activities of daily living  4. She is  up to date on all relevant gynecologic and colorectal screenings     Plan   1. The following data needs to be obtained to update her medical records: last mammogram and operative report from her GYN surgery.   2. She was encouraged to get yearly mammograms.  She should report any palpable breast lump(s) or skin changes regardless of mammographic findings.  I explained to Iliana that notification regarding her mammogram results will come from the center performing the study.  Our office will not be routinely calling with mammogram results.  It is her responsibility to make sure that the results from the mammogram are communicated to her by the breast center.  If she has any questions about the results, she is welcome to call our office anytime. The phone number to schedule a mammogram will be given to Iliana today at the time of check out.  I explained that she should be able to call the center directly to schedule her screening mammogram without a physician's order.  So long as she gives them our name, a copy of the mammogram report should be sent to us for review.  3. The importance of keeping all planned follow-up and taking all medications as prescribed was emphasized.  4. Follow up for trial of pessary          This note was electronically signed.    Diomedes Parsons M.D.  April 22, 2019    Note: Speech recognition transcription software may have been used to create portions of this document.  An attempt at proofreading has been made but errors in transcription could still be present.

## 2020-08-01 ENCOUNTER — HOSPITAL ENCOUNTER (EMERGENCY)
Facility: HOSPITAL | Age: 65
Discharge: HOME | End: 2020-08-01
Attending: EMERGENCY MEDICINE | Admitting: EMERGENCY MEDICINE

## 2020-08-01 ENCOUNTER — APPOINTMENT (OUTPATIENT)
Dept: CT IMAGING | Facility: HOSPITAL | Age: 65
End: 2020-08-01

## 2020-08-01 VITALS
WEIGHT: 160 LBS | HEART RATE: 102 BPM | TEMPERATURE: 97.8 F | DIASTOLIC BLOOD PRESSURE: 80 MMHG | HEIGHT: 63 IN | OXYGEN SATURATION: 97 % | SYSTOLIC BLOOD PRESSURE: 138 MMHG | RESPIRATION RATE: 18 BRPM | BODY MASS INDEX: 28.35 KG/M2

## 2020-08-01 DIAGNOSIS — D72.829 LEUKOCYTOSIS, UNSPECIFIED TYPE: Primary | ICD-10-CM

## 2020-08-01 DIAGNOSIS — N30.00 ACUTE CYSTITIS WITHOUT HEMATURIA: ICD-10-CM

## 2020-08-01 DIAGNOSIS — K57.92 DIVERTICULITIS: ICD-10-CM

## 2020-08-01 LAB
ALBUMIN SERPL-MCNC: 4.1 G/DL (ref 3.5–5.2)
ALBUMIN/GLOB SERPL: 1.5 G/DL
ALP SERPL-CCNC: 59 U/L (ref 39–117)
ALT SERPL W P-5'-P-CCNC: 20 U/L (ref 1–33)
ANION GAP SERPL CALCULATED.3IONS-SCNC: 12 MMOL/L (ref 5–15)
AST SERPL-CCNC: 21 U/L (ref 1–32)
BACTERIA UR QL AUTO: ABNORMAL /HPF
BASOPHILS # BLD AUTO: 0.05 10*3/MM3 (ref 0–0.2)
BASOPHILS NFR BLD AUTO: 0.4 % (ref 0–1.5)
BILIRUB SERPL-MCNC: 0.5 MG/DL (ref 0–1.2)
BILIRUB UR QL STRIP: NEGATIVE
BUN SERPL-MCNC: 13 MG/DL (ref 8–23)
BUN/CREAT SERPL: 22 (ref 7–25)
CALCIUM SPEC-SCNC: 9.2 MG/DL (ref 8.6–10.5)
CHLORIDE SERPL-SCNC: 103 MMOL/L (ref 98–107)
CLARITY UR: ABNORMAL
CO2 SERPL-SCNC: 25 MMOL/L (ref 22–29)
COLOR UR: YELLOW
CREAT SERPL-MCNC: 0.59 MG/DL (ref 0.57–1)
DEPRECATED RDW RBC AUTO: 42.7 FL (ref 37–54)
EOSINOPHIL # BLD AUTO: 0.08 10*3/MM3 (ref 0–0.4)
EOSINOPHIL NFR BLD AUTO: 0.6 % (ref 0.3–6.2)
ERYTHROCYTE [DISTWIDTH] IN BLOOD BY AUTOMATED COUNT: 12.3 % (ref 12.3–15.4)
GFR SERPL CREATININE-BSD FRML MDRD: 103 ML/MIN/1.73
GLOBULIN UR ELPH-MCNC: 2.7 GM/DL
GLUCOSE SERPL-MCNC: 135 MG/DL (ref 65–99)
GLUCOSE UR STRIP-MCNC: NEGATIVE MG/DL
HCT VFR BLD AUTO: 39.3 % (ref 34–46.6)
HGB BLD-MCNC: 12.8 G/DL (ref 12–15.9)
HGB UR QL STRIP.AUTO: ABNORMAL
HYALINE CASTS UR QL AUTO: ABNORMAL /LPF
IMM GRANULOCYTES # BLD AUTO: 0.05 10*3/MM3 (ref 0–0.05)
IMM GRANULOCYTES NFR BLD AUTO: 0.4 % (ref 0–0.5)
KETONES UR QL STRIP: NEGATIVE
LEUKOCYTE ESTERASE UR QL STRIP.AUTO: NEGATIVE
LIPASE SERPL-CCNC: 25 U/L (ref 13–60)
LYMPHOCYTES # BLD AUTO: 2.8 10*3/MM3 (ref 0.7–3.1)
LYMPHOCYTES NFR BLD AUTO: 21.7 % (ref 19.6–45.3)
MCH RBC QN AUTO: 30.8 PG (ref 26.6–33)
MCHC RBC AUTO-ENTMCNC: 32.6 G/DL (ref 31.5–35.7)
MCV RBC AUTO: 94.5 FL (ref 79–97)
MONOCYTES # BLD AUTO: 1.7 10*3/MM3 (ref 0.1–0.9)
MONOCYTES NFR BLD AUTO: 13.2 % (ref 5–12)
NEUTROPHILS NFR BLD AUTO: 63.7 % (ref 42.7–76)
NEUTROPHILS NFR BLD AUTO: 8.2 10*3/MM3 (ref 1.7–7)
NITRITE UR QL STRIP: POSITIVE
NRBC BLD AUTO-RTO: 0 /100 WBC (ref 0–0.2)
PH UR STRIP.AUTO: 5.5 [PH] (ref 5–8)
PLATELET # BLD AUTO: 188 10*3/MM3 (ref 140–450)
PMV BLD AUTO: 12 FL (ref 6–12)
POTASSIUM SERPL-SCNC: 4 MMOL/L (ref 3.5–5.2)
PROT SERPL-MCNC: 6.8 G/DL (ref 6–8.5)
PROT UR QL STRIP: NEGATIVE
RBC # BLD AUTO: 4.16 10*6/MM3 (ref 3.77–5.28)
RBC # UR: ABNORMAL /HPF
REF LAB TEST METHOD: ABNORMAL
SODIUM SERPL-SCNC: 140 MMOL/L (ref 136–145)
SP GR UR STRIP: 1.01 (ref 1–1.03)
SQUAMOUS #/AREA URNS HPF: ABNORMAL /HPF
TROPONIN T SERPL-MCNC: <0.01 NG/ML (ref 0–0.03)
UROBILINOGEN UR QL STRIP: ABNORMAL
WBC # BLD AUTO: 12.88 10*3/MM3 (ref 3.4–10.8)
WBC UR QL AUTO: ABNORMAL /HPF

## 2020-08-01 PROCEDURE — 99284 EMERGENCY DEPT VISIT MOD MDM: CPT

## 2020-08-01 PROCEDURE — 85025 COMPLETE CBC W/AUTO DIFF WBC: CPT | Performed by: EMERGENCY MEDICINE

## 2020-08-01 PROCEDURE — 25010000002 KETOROLAC TROMETHAMINE PER 15 MG: Performed by: EMERGENCY MEDICINE

## 2020-08-01 PROCEDURE — 83690 ASSAY OF LIPASE: CPT | Performed by: EMERGENCY MEDICINE

## 2020-08-01 PROCEDURE — 25010000002 IOPAMIDOL 61 % SOLUTION: Performed by: EMERGENCY MEDICINE

## 2020-08-01 PROCEDURE — 84484 ASSAY OF TROPONIN QUANT: CPT | Performed by: EMERGENCY MEDICINE

## 2020-08-01 PROCEDURE — 87086 URINE CULTURE/COLONY COUNT: CPT | Performed by: NURSE PRACTITIONER

## 2020-08-01 PROCEDURE — 25010000002 HYDROMORPHONE PER 4 MG: Performed by: EMERGENCY MEDICINE

## 2020-08-01 PROCEDURE — 93005 ELECTROCARDIOGRAM TRACING: CPT | Performed by: EMERGENCY MEDICINE

## 2020-08-01 PROCEDURE — 96374 THER/PROPH/DIAG INJ IV PUSH: CPT

## 2020-08-01 PROCEDURE — 96375 TX/PRO/DX INJ NEW DRUG ADDON: CPT

## 2020-08-01 PROCEDURE — 87088 URINE BACTERIA CULTURE: CPT | Performed by: NURSE PRACTITIONER

## 2020-08-01 PROCEDURE — 87186 SC STD MICRODIL/AGAR DIL: CPT | Performed by: NURSE PRACTITIONER

## 2020-08-01 PROCEDURE — 81001 URINALYSIS AUTO W/SCOPE: CPT | Performed by: EMERGENCY MEDICINE

## 2020-08-01 PROCEDURE — 74177 CT ABD & PELVIS W/CONTRAST: CPT

## 2020-08-01 PROCEDURE — 80053 COMPREHEN METABOLIC PANEL: CPT | Performed by: EMERGENCY MEDICINE

## 2020-08-01 PROCEDURE — 0 DIATRIZOATE MEGLUMINE & SODIUM PER 1 ML: Performed by: EMERGENCY MEDICINE

## 2020-08-01 PROCEDURE — 25010000002 ONDANSETRON PER 1 MG: Performed by: EMERGENCY MEDICINE

## 2020-08-01 RX ORDER — SODIUM CHLORIDE 0.9 % (FLUSH) 0.9 %
10 SYRINGE (ML) INJECTION AS NEEDED
Status: DISCONTINUED | OUTPATIENT
Start: 2020-08-01 | End: 2020-08-01 | Stop reason: HOSPADM

## 2020-08-01 RX ORDER — METRONIDAZOLE 500 MG/1
500 TABLET ORAL ONCE
Status: COMPLETED | OUTPATIENT
Start: 2020-08-01 | End: 2020-08-01

## 2020-08-01 RX ORDER — HYDROMORPHONE HYDROCHLORIDE 1 MG/ML
0.5 INJECTION, SOLUTION INTRAMUSCULAR; INTRAVENOUS; SUBCUTANEOUS ONCE
Status: COMPLETED | OUTPATIENT
Start: 2020-08-01 | End: 2020-08-01

## 2020-08-01 RX ORDER — KETOROLAC TROMETHAMINE 15 MG/ML
15 INJECTION, SOLUTION INTRAMUSCULAR; INTRAVENOUS EVERY 6 HOURS PRN
Status: DISCONTINUED | OUTPATIENT
Start: 2020-08-01 | End: 2020-08-01 | Stop reason: HOSPADM

## 2020-08-01 RX ORDER — LEVOFLOXACIN 500 MG/1
500 TABLET, FILM COATED ORAL ONCE
Status: COMPLETED | OUTPATIENT
Start: 2020-08-01 | End: 2020-08-01

## 2020-08-01 RX ORDER — ONDANSETRON 2 MG/ML
4 INJECTION INTRAMUSCULAR; INTRAVENOUS ONCE
Status: COMPLETED | OUTPATIENT
Start: 2020-08-01 | End: 2020-08-01

## 2020-08-01 RX ORDER — CIPROFLOXACIN 500 MG/1
500 TABLET, FILM COATED ORAL 2 TIMES DAILY
Qty: 20 TABLET | Refills: 0 | Status: SHIPPED | OUTPATIENT
Start: 2020-08-01 | End: 2021-01-20

## 2020-08-01 RX ORDER — METRONIDAZOLE 500 MG/1
500 TABLET ORAL 3 TIMES DAILY
Qty: 30 TABLET | Refills: 0 | Status: SHIPPED | OUTPATIENT
Start: 2020-08-01 | End: 2021-01-20

## 2020-08-01 RX ORDER — ONDANSETRON 4 MG/1
4 TABLET, ORALLY DISINTEGRATING ORAL EVERY 8 HOURS PRN
Qty: 12 TABLET | Refills: 0 | Status: SHIPPED | OUTPATIENT
Start: 2020-08-01 | End: 2021-01-20

## 2020-08-01 RX ORDER — HYDROCODONE BITARTRATE AND ACETAMINOPHEN 5; 325 MG/1; MG/1
1-2 TABLET ORAL EVERY 6 HOURS PRN
Qty: 12 TABLET | Refills: 0 | Status: SHIPPED | OUTPATIENT
Start: 2020-08-01 | End: 2021-01-20

## 2020-08-01 RX ADMIN — LEVOFLOXACIN 500 MG: 500 TABLET, FILM COATED ORAL at 18:58

## 2020-08-01 RX ADMIN — METRONIDAZOLE 500 MG: 500 TABLET ORAL at 18:58

## 2020-08-01 RX ADMIN — IOPAMIDOL 100 ML: 612 INJECTION, SOLUTION INTRAVENOUS at 16:18

## 2020-08-01 RX ADMIN — SODIUM CHLORIDE 1000 ML: 9 INJECTION, SOLUTION INTRAVENOUS at 13:53

## 2020-08-01 RX ADMIN — KETOROLAC TROMETHAMINE 15 MG: 15 INJECTION, SOLUTION INTRAMUSCULAR; INTRAVENOUS at 13:55

## 2020-08-01 RX ADMIN — ONDANSETRON 4 MG: 2 INJECTION INTRAMUSCULAR; INTRAVENOUS at 13:50

## 2020-08-01 RX ADMIN — HYDROMORPHONE HYDROCHLORIDE 0.5 MG: 1 INJECTION, SOLUTION INTRAMUSCULAR; INTRAVENOUS; SUBCUTANEOUS at 18:45

## 2020-08-01 RX ADMIN — DIATRIZOATE MEGLUMINE AND DIATRIZOATE SODIUM 15 ML: 660; 100 LIQUID ORAL; RECTAL at 13:50

## 2020-08-01 NOTE — DISCHARGE INSTRUCTIONS
Please call ASAP to arrange outpatient follow up with one of the following gastroenterologists:    [facility] Provider Dr. Ion Waldrop, Dr. HAMILTON Devine, Dr. Kana Barth, CECILY Arnold    Office Location:  [address] [place] [address]    Office # [phone]    Or    [facility] Provider Dr. Benedict Herrera and Dr. Ernesto Dukes    Office Location:   [address] [place] [address]    Office # [phone]

## 2020-08-01 NOTE — ED PROVIDER NOTES
Subjective   Patient presents the ER for diffuse abdominal tenderness last several days as well as low back pain.  She has a history of diverticulitis and this feels similar.  She has been eating zucchini and squash from her garden and she feels like that caused her previous episode of diverticulitis.  She has also felt like she has been constipated and has tried several medications with no relief.  She denies fever chest pain or difficulty breathing.  She does have a history of uterine cancer with a hysterectomy that was several years ago as well as a abdominal reconstruction surgery where she had infection of Pseudomonas around 10 years ago.      Abdominal Pain   Pain location:  Generalized  Pain quality: aching and cramping    Pain radiates to:  Does not radiate  Pain severity:  Moderate  Onset quality:  Gradual  Duration:  2 days  Timing:  Constant  Progression:  Waxing and waning  Chronicity:  New  Context: eating    Relieved by:  Nothing  Worsened by:  Eating and movement  Associated symptoms: constipation    Associated symptoms: no chest pain, no chills, no cough, no diarrhea, no dysuria, no fever, no hematuria, no nausea, no shortness of breath, no sore throat and no vomiting        Review of Systems   Constitutional: Negative for chills, diaphoresis and fever.   HENT: Negative for congestion and sore throat.    Respiratory: Negative for cough, choking, chest tightness, shortness of breath and wheezing.    Cardiovascular: Negative for chest pain and leg swelling.   Gastrointestinal: Positive for abdominal pain and constipation. Negative for abdominal distention, anal bleeding, blood in stool, diarrhea, nausea and vomiting.   Genitourinary: Negative for difficulty urinating, dysuria, flank pain, frequency, hematuria and urgency.   All other systems reviewed and are negative.      Past Medical History:   Diagnosis Date   • Acquired hypothyroidism 1992   • History of uterine cancer 2015    Tx - hysterectomy and  LN's; no chemo; no XRT   • Meniere disease 1994       Allergies   Allergen Reactions   • Penicillins Hives       Past Surgical History:   Procedure Laterality Date   • ABDOMINOPLASTY  2009   • TOTAL ABDOMINAL HYSTERECTOMY SALPINGO OOPHORECTOMY PELVIC NODE DISSECTION  06/2015    Dr. Neely [facility] - Done for uterine cancer.  Along with bladder suspension       Family History   Problem Relation Age of Onset   • Uterine cancer Sister    • Breast cancer Maternal Grandmother        Social History     Socioeconomic History   • Marital status:      Spouse name: Not on file   • Number of children: Not on file   • Years of education: Not on file   • Highest education level: Not on file   Tobacco Use   • Smoking status: Never Smoker   • Smokeless tobacco: Never Used   Substance and Sexual Activity   • Alcohol use: No   • Drug use: No           Objective   Physical Exam   Constitutional: She is oriented to person, place, and time. She appears well-developed and well-nourished.   HENT:   Head: Normocephalic and atraumatic.   Right Ear: External ear normal.   Left Ear: External ear normal.   Nose: Nose normal.   Mouth/Throat: Oropharynx is clear and moist.   Eyes: Pupils are equal, round, and reactive to light. Conjunctivae and EOM are normal.   Neck: Normal range of motion. Neck supple.   Cardiovascular: Normal rate, regular rhythm, normal heart sounds and intact distal pulses.   Pulmonary/Chest: Effort normal and breath sounds normal.   Abdominal: Soft. Bowel sounds are normal. There is tenderness.   Musculoskeletal: Normal range of motion.   Neurological: She is alert and oriented to person, place, and time.   Skin: Skin is warm and dry.   Psychiatric: She has a normal mood and affect. Her behavior is normal. Judgment and thought content normal.       Procedures           ED Course  ED Course as of Aug 01 1845   Sat Aug 01, 2020   1824 I had a long sitdown conversation with the patient and her family  at the bedside.  Discussed the results of the CAT scan as well as her urinalysis.  She reports having some slight dysuria but she also has a bladder prolapse and she thought that might be related to it.  However this does feel similar to her diverticulitis.  I will get a culture of her urine and give her appropriate follow-up.  The patient is well aware the signs of worse condition.  All thankful and agreeable.    [JM]   [address] [facility] Request Number: [id number]        [CR]      ED Course User Index  [CR] Ruiz Randhawa  [VIVIANE] Dano Martinez APRN                                           [facility]    Final diagnoses:   Leukocytosis, unspecified type   Diverticulitis   Acute cystitis without hematuria            Dano Martinez APRN  08/01/20 [id number]

## 2020-08-04 LAB — BACTERIA SPEC AEROBE CULT: ABNORMAL

## 2020-09-10 ENCOUNTER — LAB (OUTPATIENT)
Dept: LAB | Facility: HOSPITAL | Age: 65
End: 2020-09-10

## 2020-09-10 ENCOUNTER — TRANSCRIBE ORDERS (OUTPATIENT)
Dept: LAB | Facility: HOSPITAL | Age: 65
End: 2020-09-10

## 2020-09-10 DIAGNOSIS — Z11.2 SCREENING EXAMINATION FOR LEPROSY (HANSEN'S DISEASE): ICD-10-CM

## 2020-09-10 DIAGNOSIS — N39.0 URINARY TRACT INFECTION WITHOUT HEMATURIA, SITE UNSPECIFIED: ICD-10-CM

## 2020-09-10 DIAGNOSIS — Z11.2 SCREENING EXAMINATION FOR LEPROSY (HANSEN'S DISEASE): Primary | ICD-10-CM

## 2020-09-10 LAB
BACTERIA UR QL AUTO: NORMAL /HPF
BILIRUB UR QL STRIP: NEGATIVE
CLARITY UR: CLEAR
COLOR UR: YELLOW
GLUCOSE UR STRIP-MCNC: NEGATIVE MG/DL
HGB UR QL STRIP.AUTO: NEGATIVE
HYALINE CASTS UR QL AUTO: NORMAL /LPF
KETONES UR QL STRIP: NEGATIVE
LEUKOCYTE ESTERASE UR QL STRIP.AUTO: NEGATIVE
NITRITE UR QL STRIP: NEGATIVE
PH UR STRIP.AUTO: 6.5 [PH] (ref 5–8)
PROT UR QL STRIP: NEGATIVE
RBC # UR: NORMAL /HPF
REF LAB TEST METHOD: NORMAL
SP GR UR STRIP: 1.01 (ref 1–1.03)
SQUAMOUS #/AREA URNS HPF: NORMAL /HPF
UROBILINOGEN UR QL STRIP: NORMAL
WBC UR QL AUTO: NORMAL /HPF

## 2020-09-10 PROCEDURE — 81001 URINALYSIS AUTO W/SCOPE: CPT

## 2021-01-20 PROCEDURE — 87086 URINE CULTURE/COLONY COUNT: CPT | Performed by: NURSE PRACTITIONER

## 2021-01-20 PROCEDURE — 87186 SC STD MICRODIL/AGAR DIL: CPT | Performed by: NURSE PRACTITIONER

## 2021-01-20 PROCEDURE — 87088 URINE BACTERIA CULTURE: CPT | Performed by: NURSE PRACTITIONER

## 2021-01-22 ENCOUNTER — TELEPHONE (OUTPATIENT)
Dept: URGENT CARE | Facility: CLINIC | Age: 66
End: 2021-01-22

## 2021-01-23 ENCOUNTER — TELEPHONE (OUTPATIENT)
Dept: URGENT CARE | Facility: CLINIC | Age: 66
End: 2021-01-23

## 2021-04-08 PROCEDURE — 87077 CULTURE AEROBIC IDENTIFY: CPT | Performed by: NURSE PRACTITIONER

## 2021-04-08 PROCEDURE — 87186 SC STD MICRODIL/AGAR DIL: CPT | Performed by: NURSE PRACTITIONER

## 2021-04-08 PROCEDURE — 87086 URINE CULTURE/COLONY COUNT: CPT | Performed by: NURSE PRACTITIONER

## 2021-04-10 ENCOUNTER — TELEPHONE (OUTPATIENT)
Dept: URGENT CARE | Facility: CLINIC | Age: 66
End: 2021-04-10

## 2021-04-11 ENCOUNTER — TELEPHONE (OUTPATIENT)
Dept: URGENT CARE | Facility: CLINIC | Age: 66
End: 2021-04-11

## 2021-05-14 PROCEDURE — 87086 URINE CULTURE/COLONY COUNT: CPT | Performed by: PHYSICIAN ASSISTANT

## 2021-05-15 ENCOUNTER — TELEPHONE (OUTPATIENT)
Dept: URGENT CARE | Facility: CLINIC | Age: 66
End: 2021-05-15

## 2021-05-15 DIAGNOSIS — N39.0 ACUTE UTI: Primary | ICD-10-CM

## 2021-05-15 RX ORDER — NITROFURANTOIN 25; 75 MG/1; MG/1
100 CAPSULE ORAL EVERY 12 HOURS SCHEDULED
Qty: 14 CAPSULE | Refills: 0 | Status: SHIPPED | OUTPATIENT
Start: 2021-05-15 | End: 2021-05-22

## 2021-05-15 NOTE — TELEPHONE ENCOUNTER
Patient states she thought she could take bactrim but had a reaction of arm swelling, throat feeling tight, and itching yesterday.  Took 2 benadryl and feels okay right now besides itching.  Verified pharmacy and allergies.  Added bactrim to allergies.  Will send in another antibiotic per HAMILTON Barger. Sent note to HAMILTON Barger.

## 2021-05-16 ENCOUNTER — TELEPHONE (OUTPATIENT)
Dept: URGENT CARE | Facility: CLINIC | Age: 66
End: 2021-05-16

## 2021-07-28 PROCEDURE — 87086 URINE CULTURE/COLONY COUNT: CPT | Performed by: FAMILY MEDICINE

## 2021-07-30 ENCOUNTER — TELEPHONE (OUTPATIENT)
Dept: URGENT CARE | Facility: CLINIC | Age: 66
End: 2021-07-30

## 2021-08-01 ENCOUNTER — TELEPHONE (OUTPATIENT)
Dept: URGENT CARE | Facility: CLINIC | Age: 66
End: 2021-08-01

## 2022-03-24 PROCEDURE — 87186 SC STD MICRODIL/AGAR DIL: CPT | Performed by: NURSE PRACTITIONER

## 2022-03-24 PROCEDURE — 87086 URINE CULTURE/COLONY COUNT: CPT | Performed by: NURSE PRACTITIONER

## 2022-03-24 PROCEDURE — 87077 CULTURE AEROBIC IDENTIFY: CPT | Performed by: NURSE PRACTITIONER

## 2022-05-06 ENCOUNTER — LAB (OUTPATIENT)
Dept: LAB | Facility: HOSPITAL | Age: 67
End: 2022-05-06

## 2022-05-06 ENCOUNTER — OFFICE VISIT (OUTPATIENT)
Dept: INTERNAL MEDICINE | Facility: CLINIC | Age: 67
End: 2022-05-06

## 2022-05-06 VITALS
BODY MASS INDEX: 31.4 KG/M2 | RESPIRATION RATE: 18 BRPM | OXYGEN SATURATION: 97 % | SYSTOLIC BLOOD PRESSURE: 124 MMHG | HEIGHT: 63 IN | DIASTOLIC BLOOD PRESSURE: 82 MMHG | HEART RATE: 96 BPM | WEIGHT: 177.2 LBS | TEMPERATURE: 96.9 F

## 2022-05-06 DIAGNOSIS — K57.90 DIVERTICULOSIS: ICD-10-CM

## 2022-05-06 DIAGNOSIS — J30.9 ALLERGIC RHINITIS, UNSPECIFIED SEASONALITY, UNSPECIFIED TRIGGER: ICD-10-CM

## 2022-05-06 DIAGNOSIS — K21.9 GASTROESOPHAGEAL REFLUX DISEASE WITHOUT ESOPHAGITIS: ICD-10-CM

## 2022-05-06 DIAGNOSIS — N76.0 ACUTE VAGINITIS: ICD-10-CM

## 2022-05-06 DIAGNOSIS — H81.02 MENIERE'S DISEASE OF LEFT EAR: ICD-10-CM

## 2022-05-06 DIAGNOSIS — E03.9 HYPOTHYROIDISM, UNSPECIFIED TYPE: ICD-10-CM

## 2022-05-06 DIAGNOSIS — R10.31 COLICKY RLQ ABDOMINAL PAIN: Primary | ICD-10-CM

## 2022-05-06 DIAGNOSIS — K59.00 CONSTIPATION, UNSPECIFIED CONSTIPATION TYPE: ICD-10-CM

## 2022-05-06 DIAGNOSIS — Z78.0 MENOPAUSE: ICD-10-CM

## 2022-05-06 PROCEDURE — 87491 CHLMYD TRACH DNA AMP PROBE: CPT | Performed by: NURSE PRACTITIONER

## 2022-05-06 PROCEDURE — 87798 DETECT AGENT NOS DNA AMP: CPT | Performed by: NURSE PRACTITIONER

## 2022-05-06 PROCEDURE — 99214 OFFICE O/P EST MOD 30 MIN: CPT | Performed by: NURSE PRACTITIONER

## 2022-05-06 PROCEDURE — 87801 DETECT AGNT MULT DNA AMPLI: CPT | Performed by: NURSE PRACTITIONER

## 2022-05-06 PROCEDURE — 87661 TRICHOMONAS VAGINALIS AMPLIF: CPT | Performed by: NURSE PRACTITIONER

## 2022-05-06 PROCEDURE — 87591 N.GONORRHOEAE DNA AMP PROB: CPT | Performed by: NURSE PRACTITIONER

## 2022-05-06 RX ORDER — FLUTICASONE FUROATE 27.5 UG/1
1 SPRAY, METERED NASAL DAILY
Qty: 9.1 ML | Refills: 0 | Status: SHIPPED | OUTPATIENT
Start: 2022-05-06 | End: 2022-05-12 | Stop reason: SDUPTHER

## 2022-05-06 RX ORDER — LEVOTHYROXINE SODIUM 0.03 MG/1
25 TABLET ORAL DAILY
Qty: 90 TABLET | Refills: 0 | Status: SHIPPED | OUTPATIENT
Start: 2022-05-06 | End: 2022-05-12 | Stop reason: SDUPTHER

## 2022-05-06 RX ORDER — OMEPRAZOLE 20 MG/1
20 CAPSULE, DELAYED RELEASE ORAL DAILY
COMMUNITY
End: 2022-07-01 | Stop reason: SDUPTHER

## 2022-05-06 RX ORDER — TRIAMTERENE AND HYDROCHLOROTHIAZIDE 37.5; 25 MG/1; MG/1
1 CAPSULE ORAL EVERY MORNING
Qty: 90 CAPSULE | Refills: 0 | Status: SHIPPED | OUTPATIENT
Start: 2022-05-06 | End: 2022-05-12 | Stop reason: SDUPTHER

## 2022-05-06 NOTE — PROGRESS NOTES
"  New Patient Office Visit      Patient Name: Iliana Barriga  : 1955   MRN: [id number]     Chief Complaint:    Chief Complaint   Patient presents with   • Diverticulitis     New Patient   • Urinary Tract Infection     Chronic UTIs       History of Present Illness: Iliana Barriga is a 66 y.o. female presents to clinic today to establish care.  Her  has recently passed away.     Diverticulosis dx ; colonoscopy completed without diverticulitis; no polyps; 5 year repeat recommended; CT 2020: showed mild diverticulitis .       Constipation  She is taking linaclotide 72 mcg as needed. Takes miralax three times a week.   She can control symptoms with diet usually. She states she has had pelvis and abdominal pain described as a \"furnace\" . She has had \"pinkish stools\". RLQ pain that comes and goes described as  \"colicky\" .     Vaginal and labia itching associated with odor. Also has a discharge that is white (gritty). No dysuria today. History of cystocele and modified bladder sling with ant/posterior vaginal repair. She has tried a pessary in the past and didn't tolerate.     Reflux symptoms that worsened on the last week; Tums with some improvement.     Allergic rhinitis; symptoms controlled on medications; needs refill.    Menopause; she takes estrogen. She has not had any adverse effects. No history of blood clots. She had uterine cancer. She is requesting refills.     Hypothyroidism  She is taking her medication as prescribed.She needs refills    Meniere's disease  Symptoms controlled on triamterene-hctz 37-25 mg daily; she needs refills.         Past Surgical History:   Procedure Laterality Date   • ABDOMINOPLASTY     • BLADDER SLING MODIFIED, ANTERIOR AND POSTERIOR VAGINAL REPAIR     • TOTAL ABDOMINAL HYSTERECTOMY SALPINGO OOPHORECTOMY PELVIC NODE DISSECTION  2015    Dr. Neely [facility] [place] - Done for uterine cancer.  Along with bladder suspension "     Subjective     Review of System: Review of Systems   Constitutional: Negative for fatigue and fever.   HENT: Negative for congestion and rhinorrhea.    Respiratory: Negative for shortness of breath and wheezing.    Cardiovascular: Negative for chest pain and palpitations.   Gastrointestinal: Positive for abdominal pain, diarrhea and nausea. Negative for vomiting.   Genitourinary: Positive for pelvic pain and vaginal discharge. Negative for dysuria.   Musculoskeletal: Negative for myalgias.   Skin: Negative for rash.   Neurological: Negative for headaches.   Hematological: Negative for adenopathy.   Psychiatric/Behavioral: Negative for dysphoric mood.      I have reviewed the ROS documented by my clinical staff, updated appropriately and I agree. CECILY Aguila    Past Medical History:   Past Medical History:   Diagnosis Date   • Acquired hypothyroidism 1992   • Cancer (HCC)    • Colon polyp    • Diverticulosis    • GERD (gastroesophageal reflux disease)    • History of uterine cancer 2015    Tx - hysterectomy and LN's; no chemo; no XRT   • HL (hearing loss)    • Meniere disease 1994   • Peptic ulceration        Past Surgical History:   Past Surgical History:   Procedure Laterality Date   • ABDOMINOPLASTY  2009   • BLADDER SLING MODIFIED, ANTERIOR AND POSTERIOR VAGINAL REPAIR     • TOTAL ABDOMINAL HYSTERECTOMY SALPINGO OOPHORECTOMY PELVIC NODE DISSECTION  06/2015    Dr. Neely [facility] - Done for uterine cancer.  Along with bladder suspension       Family History:   Family History   Problem Relation Age of Onset   • Thyroid disease Mother    • Arthritis Mother    • Thyroid disease Sister    • Uterine cancer Sister    • Thyroid disease Brother    • Heart disease Brother    • Breast cancer Maternal Grandmother        Social History:   Social History     Socioeconomic History   • Marital status:    Tobacco Use   • Smoking status: Former Smoker   • Smokeless tobacco: Never Used   Vaping  Use   • Vaping Use: Never used   Substance and Sexual Activity   • Alcohol use: Yes     Alcohol/week: 4.0 - 8.0 standard drinks     Types: 2 - 4 Glasses of wine, 2 - 4 Standard drinks or equivalent per week   • Drug use: No   • Sexual activity: Not Currently       Medications:     Current Outpatient Medications:   •  albuterol sulfate  (90 Base) MCG/ACT inhaler, Inhale 2 puffs Every 6 (Six) Hours As Needed for Wheezing or Shortness of Air (cough)., Disp: 18 g, Rfl: 3  •  Atrovent HFA 17 MCG/ACT inhaler, Inhale 1 puff 2 (Two) Times a Day., Disp: , Rfl:   •  azelastine (ASTELIN) 0.1 % nasal spray, 2 sprays into the nostril(s) as directed by provider., Disp: , Rfl:   •  [employer] D3 1000 units capsule, Take  by mouth Daily., Disp: , Rfl: 1  •  cyclobenzaprine (FLEXERIL) 5 MG tablet, Take 1 tablet by mouth Every 8 (Eight) Hours., Disp: , Rfl:   •  dicyclomine (BENTYL) 10 MG capsule, Take 1 capsule by mouth 3 (Three) Times a Day As Needed (abdominal pain)., Disp: 30 capsule, Rfl: 0  •  estrogens, conjugated, (PREMARIN) 0.625 MG tablet, Take 1 tablet by mouth Daily., Disp: 90 tablet, Rfl: 0  •  fluticasone (Flonase Sensimist) 27.5 MCG/SPRAY nasal spray, 1 spray into the nostril(s) as directed by provider Daily for 30 days., Disp: 9.1 mL, Rfl: 0  •  levothyroxine (SYNTHROID, LEVOTHROID) 25 MCG tablet, Take 1 tablet by mouth Daily., Disp: 90 tablet, Rfl: 0  •  linaclotide (LINZESS) 72 MCG capsule capsule, Take 1 capsule by mouth Every Morning Before Breakfast., Disp: 90 capsule, Rfl: 0  •  Magnesium Oxide 500 MG tablet,  0 Refill(s), Disp: , Rfl:   •  Multiple Vitamins-Minerals (MULTIVITAMIN ADULT EXTRA C PO), Take  by mouth., Disp: , Rfl:   •  omeprazole (priLOSEC) 20 MG capsule, Take 20 mg by mouth Daily., Disp: , Rfl:   •  triamterene-hydrochlorothiazide (DYAZIDE) 37.5-25 MG per capsule, Take 1 capsule by mouth Every Morning., Disp: 90 capsule, Rfl: 0    Allergies:   Allergies   Allergen Reactions   •  "Sulfamethoxazole-Trimethoprim Itching, Swelling and Hives     Throat felt tight and arm swelled  Throat felt tight and arm swelled     • Penicillins Hives       Objective     Physical Exam:   Vital Signs:   Vitals:    05/06/22 0836   BP: 124/82   BP Location: Right arm   Patient Position: Sitting   Cuff Size: Adult   Pulse: 96   Resp: 18   Temp: 96.9 °F (36.1 °C)   TempSrc: Infrared   SpO2: 97%   Weight: 80.4 kg (177 lb 3.2 oz)   Height: 158.8 cm (62.5\")   PainSc: 0-No pain           Physical Exam  Constitutional:       General: She is not in acute distress.     Appearance: She is not ill-appearing.   HENT:      Head: Normocephalic.   Cardiovascular:      Rate and Rhythm: Normal rate and regular rhythm.      Heart sounds: Normal heart sounds. No murmur heard.  Pulmonary:      Breath sounds: Normal breath sounds.   Abdominal:      General: Bowel sounds are normal.      Tenderness: There is no abdominal tenderness (RLQ (slight)). There is no right CVA tenderness, left CVA tenderness, guarding or rebound.   Lymphadenopathy:      Cervical: No cervical adenopathy.   Skin:     General: Skin is warm and dry.   Neurological:      General: No focal deficit present.      Mental Status: She is oriented to person, place, and time.   Psychiatric:         Mood and Affect: Mood normal.       PELVIC EXAM: normal external genitalia, vulva, vagina, cervix, uterus and adnexa, VULVA: normal appearing vulva with no masses, tenderness or lesions, no vulvar tenderness , VAGINA: atrophic,  vaginal discharge - white and copious, CERVIX: surgically absent, RECTAL: declined by the patient, no visible warts exam chaperoned by Shy chadwick taken    Assessment / Plan      Assessment/Plan:   Diagnoses and all orders for this visit:    1. Colicky RLQ abdominal pain (Primary)  -     Ambulatory Referral to Colorectal Surgery    2. Gastroesophageal reflux disease without esophagitis  -     Ambulatory Referral to Colorectal Surgery    3. " Menopause  -     estrogens, conjugated, (PREMARIN) 0.625 MG tablet; Take 1 tablet by mouth Daily.  Dispense: 90 tablet; Refill: 0    Discussed risk of blood clots and cancer. Patient has tolerated it well and would like to continue.     4. Hypothyroidism, unspecified type  -     levothyroxine (SYNTHROID, LEVOTHROID) 25 MCG tablet; Take 1 tablet by mouth Daily.  Dispense: 90 tablet; Refill: 0    5. Allergic rhinitis, unspecified seasonality, unspecified trigger  -     fluticasone (Flonase Sensimist) 27.5 MCG/SPRAY nasal spray; 1 spray into the nostril(s) as directed by provider Daily for 30 days.  Dispense: 9.1 mL; Refill: 0    6. Meniere's disease of left ear  -     triamterene-hydrochlorothiazide (DYAZIDE) 37.5-25 MG per capsule; Take 1 capsule by mouth Every Morning.  Dispense: 90 capsule; Refill: 0    7. Diverticulosis    8. Constipation, unspecified constipation type  -     linaclotide (LINZESS) 72 MCG capsule capsule; Take 1 capsule by mouth Every Morning Before Breakfast.  Dispense: 90 capsule; Refill: 0    9. Acute vaginitis  -     NuSwab VG+ - , Vagina; Future  -     NuSwab VG+ - Swab, Vagina  Discussed insurance may not cover this test. Patient states her secondary will usually cover the remainder. Patient verbalized an understanding.              Follow Up:   Return in about 2 months (around 7/6/2022) for Annual, PAP. I will call patient with results.     CECILY Aguila  [facility]

## 2022-05-12 ENCOUNTER — TELEPHONE (OUTPATIENT)
Dept: INTERNAL MEDICINE | Facility: CLINIC | Age: 67
End: 2022-05-12

## 2022-05-12 DIAGNOSIS — H81.02 MENIERE'S DISEASE OF LEFT EAR: ICD-10-CM

## 2022-05-12 DIAGNOSIS — Z78.0 MENOPAUSE: ICD-10-CM

## 2022-05-12 DIAGNOSIS — J30.9 ALLERGIC RHINITIS, UNSPECIFIED SEASONALITY, UNSPECIFIED TRIGGER: ICD-10-CM

## 2022-05-12 DIAGNOSIS — E03.9 HYPOTHYROIDISM, UNSPECIFIED TYPE: ICD-10-CM

## 2022-05-12 LAB
A VAGINAE DNA VAG QL NAA+PROBE: NORMAL SCORE
BVAB2 DNA VAG QL NAA+PROBE: NORMAL SCORE
C ALBICANS DNA VAG QL NAA+PROBE: NEGATIVE
C GLABRATA DNA VAG QL NAA+PROBE: NEGATIVE
C TRACH DNA VAG QL NAA+PROBE: NEGATIVE
MEGA1 DNA VAG QL NAA+PROBE: NORMAL SCORE
N GONORRHOEA DNA VAG QL NAA+PROBE: NEGATIVE
T VAGINALIS DNA VAG QL NAA+PROBE: NEGATIVE

## 2022-05-12 RX ORDER — TRIAMTERENE AND HYDROCHLOROTHIAZIDE 37.5; 25 MG/1; MG/1
1 CAPSULE ORAL EVERY MORNING
Qty: 90 CAPSULE | Refills: 0 | Status: SHIPPED | OUTPATIENT
Start: 2022-05-12 | End: 2022-07-20

## 2022-05-12 RX ORDER — LEVOTHYROXINE SODIUM 0.03 MG/1
25 TABLET ORAL DAILY
Qty: 90 TABLET | Refills: 0 | Status: SHIPPED | OUTPATIENT
Start: 2022-05-12 | End: 2022-07-20

## 2022-05-12 RX ORDER — FLUTICASONE FUROATE 27.5 UG/1
1 SPRAY, METERED NASAL DAILY
Qty: 9.1 ML | Refills: 3 | Status: SHIPPED | OUTPATIENT
Start: 2022-05-12 | End: 2023-01-27

## 2022-05-12 NOTE — TELEPHONE ENCOUNTER
Provider: LINDSEY TIERNEY    Caller: JOELLE SALAZAR    Relationship to Patient: SELF    Pharmacy: [id number]    Phone Number: [phone]    Reason for Call: PATIENT WANTED TO MAKE THE [facility] HER DEFAULT PHARMACY.     yes

## 2022-05-17 PROCEDURE — U0004 COV-19 TEST NON-CDC HGH THRU: HCPCS | Performed by: NURSE PRACTITIONER

## 2022-05-18 ENCOUNTER — TELEPHONE (OUTPATIENT)
Dept: INTERNAL MEDICINE | Facility: CLINIC | Age: 67
End: 2022-05-18

## 2022-05-18 NOTE — TELEPHONE ENCOUNTER
The patient will need to be assessed for hypoxia if she is concerned about shortness of breath in the emergency department.  Guidelines are in place for oxygen testing prior to oxygen being ordered for home.  This is not possible without the patient being seen in the emergency department.

## 2022-05-18 NOTE — TELEPHONE ENCOUNTER
Ms. Barrientos stated she is covid positive and will not be going to ER for treatment because she made the mistake of taking her  there after contacting covid, where he passed later from infections developed while there.  Pt stated she is not sure if  and PA are aware because of covid regulations Respiratory Therapy cannot be performed at the ER. Pt added oxygen level is 98; pulse 93-94, she can tell that her body is working hard to keep up. She is also using azelastine 0.1% nasal spray.   Pt is requesting orders for   Home oxygen  Home breathing treatment; stated she is able to do respiratory treatment herself  Insurance allows unlimited RN visits.  Orders can be faxed to  [facility] [phone].  Please advise?

## 2022-05-18 NOTE — TELEPHONE ENCOUNTER
Caller: Iliana Barriga    Relationship: Self    Best call back number: [phone]    Who are you requesting to speak with (clinical staff, provider,  specific staff member): LINDSEY TIERNEY     What was the call regarding: PATIENT STATED THAT SHE HAS MEDICAL QUESTIONS    Do you require a callback: YES

## 2022-05-20 ENCOUNTER — TELEPHONE (OUTPATIENT)
Dept: INTERNAL MEDICINE | Facility: CLINIC | Age: 67
End: 2022-05-20

## 2022-05-20 NOTE — TELEPHONE ENCOUNTER
Pt advised of clinical message. Pt verbalized good understanding and stated she will not be going to ER because of what happened to her  .  Pt added she used inhaler at home and is feeling much better.   Advised let office know if Sx change; new Sx develop.

## 2022-07-01 ENCOUNTER — LAB (OUTPATIENT)
Dept: LAB | Facility: HOSPITAL | Age: 67
End: 2022-07-01

## 2022-07-01 ENCOUNTER — OFFICE VISIT (OUTPATIENT)
Dept: INTERNAL MEDICINE | Facility: CLINIC | Age: 67
End: 2022-07-01

## 2022-07-01 VITALS
SYSTOLIC BLOOD PRESSURE: 118 MMHG | TEMPERATURE: 97 F | DIASTOLIC BLOOD PRESSURE: 80 MMHG | HEIGHT: 63 IN | HEART RATE: 82 BPM | BODY MASS INDEX: 29.98 KG/M2 | OXYGEN SATURATION: 98 % | RESPIRATION RATE: 16 BRPM | WEIGHT: 169.2 LBS

## 2022-07-01 DIAGNOSIS — Z00.00 WELLNESS EXAMINATION: Primary | ICD-10-CM

## 2022-07-01 DIAGNOSIS — K21.9 GASTROESOPHAGEAL REFLUX DISEASE WITHOUT ESOPHAGITIS: ICD-10-CM

## 2022-07-01 DIAGNOSIS — Z12.31 ENCOUNTER FOR SCREENING MAMMOGRAM FOR BREAST CANCER: ICD-10-CM

## 2022-07-01 DIAGNOSIS — Z23 ENCOUNTER FOR IMMUNIZATION: ICD-10-CM

## 2022-07-01 DIAGNOSIS — Z13.220 ENCOUNTER FOR LIPID SCREENING FOR CARDIOVASCULAR DISEASE: ICD-10-CM

## 2022-07-01 DIAGNOSIS — Z08 PAP SMEAR OF VAGINA WITH PREVIOUS HYSTERECTOMY FOR CANCER: ICD-10-CM

## 2022-07-01 DIAGNOSIS — Z90.710 PAP SMEAR OF VAGINA WITH PREVIOUS HYSTERECTOMY FOR CANCER: ICD-10-CM

## 2022-07-01 DIAGNOSIS — Z13.6 ENCOUNTER FOR LIPID SCREENING FOR CARDIOVASCULAR DISEASE: ICD-10-CM

## 2022-07-01 DIAGNOSIS — E55.9 VITAMIN D DEFICIENCY: ICD-10-CM

## 2022-07-01 DIAGNOSIS — E03.9 HYPOTHYROIDISM, UNSPECIFIED TYPE: ICD-10-CM

## 2022-07-01 LAB
25(OH)D3 SERPL-MCNC: 39.7 NG/ML (ref 30–100)
ALBUMIN SERPL-MCNC: 4.5 G/DL (ref 3.5–5.2)
ALBUMIN/GLOB SERPL: 1.7 G/DL
ALP SERPL-CCNC: 52 U/L (ref 39–117)
ALT SERPL W P-5'-P-CCNC: 14 U/L (ref 1–33)
ANION GAP SERPL CALCULATED.3IONS-SCNC: 11.5 MMOL/L (ref 5–15)
AST SERPL-CCNC: 16 U/L (ref 1–32)
BASOPHILS # BLD AUTO: 0.06 10*3/MM3 (ref 0–0.2)
BASOPHILS NFR BLD AUTO: 0.9 % (ref 0–1.5)
BILIRUB SERPL-MCNC: 0.4 MG/DL (ref 0–1.2)
BUN SERPL-MCNC: 14 MG/DL (ref 8–23)
BUN/CREAT SERPL: 21.2 (ref 7–25)
CALCIUM SPEC-SCNC: 9.1 MG/DL (ref 8.6–10.5)
CHLORIDE SERPL-SCNC: 102 MMOL/L (ref 98–107)
CHOLEST SERPL-MCNC: 182 MG/DL (ref 0–200)
CO2 SERPL-SCNC: 24.5 MMOL/L (ref 22–29)
CREAT SERPL-MCNC: 0.66 MG/DL (ref 0.57–1)
DEPRECATED RDW RBC AUTO: 39.7 FL (ref 37–54)
EGFRCR SERPLBLD CKD-EPI 2021: 96.9 ML/MIN/1.73
EOSINOPHIL # BLD AUTO: 0.08 10*3/MM3 (ref 0–0.4)
EOSINOPHIL NFR BLD AUTO: 1.2 % (ref 0.3–6.2)
ERYTHROCYTE [DISTWIDTH] IN BLOOD BY AUTOMATED COUNT: 12.3 % (ref 12.3–15.4)
GLOBULIN UR ELPH-MCNC: 2.6 GM/DL
GLUCOSE SERPL-MCNC: 84 MG/DL (ref 65–99)
HCT VFR BLD AUTO: 39 % (ref 34–46.6)
HDLC SERPL-MCNC: 56 MG/DL (ref 40–60)
HGB BLD-MCNC: 13.5 G/DL (ref 12–15.9)
IMM GRANULOCYTES # BLD AUTO: 0.01 10*3/MM3 (ref 0–0.05)
IMM GRANULOCYTES NFR BLD AUTO: 0.1 % (ref 0–0.5)
LDLC SERPL CALC-MCNC: 78 MG/DL (ref 0–100)
LDLC/HDLC SERPL: 1.19 {RATIO}
LYMPHOCYTES # BLD AUTO: 2.84 10*3/MM3 (ref 0.7–3.1)
LYMPHOCYTES NFR BLD AUTO: 42.1 % (ref 19.6–45.3)
MCH RBC QN AUTO: 31.2 PG (ref 26.6–33)
MCHC RBC AUTO-ENTMCNC: 34.6 G/DL (ref 31.5–35.7)
MCV RBC AUTO: 90.1 FL (ref 79–97)
MONOCYTES # BLD AUTO: 0.81 10*3/MM3 (ref 0.1–0.9)
MONOCYTES NFR BLD AUTO: 12 % (ref 5–12)
NEUTROPHILS NFR BLD AUTO: 2.94 10*3/MM3 (ref 1.7–7)
NEUTROPHILS NFR BLD AUTO: 43.7 % (ref 42.7–76)
NRBC BLD AUTO-RTO: 0 /100 WBC (ref 0–0.2)
PLATELET # BLD AUTO: 217 10*3/MM3 (ref 140–450)
PMV BLD AUTO: 11.9 FL (ref 6–12)
POTASSIUM SERPL-SCNC: 4.1 MMOL/L (ref 3.5–5.2)
PROT SERPL-MCNC: 7.1 G/DL (ref 6–8.5)
RBC # BLD AUTO: 4.33 10*6/MM3 (ref 3.77–5.28)
SODIUM SERPL-SCNC: 138 MMOL/L (ref 136–145)
T4 FREE SERPL-MCNC: 1 NG/DL (ref 0.93–1.7)
TRIGL SERPL-MCNC: 296 MG/DL (ref 0–150)
TSH SERPL DL<=0.05 MIU/L-ACNC: 2.34 UIU/ML (ref 0.27–4.2)
VLDLC SERPL-MCNC: 48 MG/DL (ref 5–40)
WBC NRBC COR # BLD: 6.74 10*3/MM3 (ref 3.4–10.8)

## 2022-07-01 PROCEDURE — 82306 VITAMIN D 25 HYDROXY: CPT | Performed by: NURSE PRACTITIONER

## 2022-07-01 PROCEDURE — 99397 PER PM REEVAL EST PAT 65+ YR: CPT | Performed by: NURSE PRACTITIONER

## 2022-07-01 PROCEDURE — 3008F BODY MASS INDEX DOCD: CPT | Performed by: NURSE PRACTITIONER

## 2022-07-01 PROCEDURE — 36415 COLL VENOUS BLD VENIPUNCTURE: CPT | Performed by: NURSE PRACTITIONER

## 2022-07-01 PROCEDURE — 85025 COMPLETE CBC W/AUTO DIFF WBC: CPT | Performed by: NURSE PRACTITIONER

## 2022-07-01 PROCEDURE — 90677 PCV20 VACCINE IM: CPT | Performed by: NURSE PRACTITIONER

## 2022-07-01 PROCEDURE — 84443 ASSAY THYROID STIM HORMONE: CPT | Performed by: NURSE PRACTITIONER

## 2022-07-01 PROCEDURE — 80053 COMPREHEN METABOLIC PANEL: CPT | Performed by: NURSE PRACTITIONER

## 2022-07-01 PROCEDURE — G0009 ADMIN PNEUMOCOCCAL VACCINE: HCPCS | Performed by: NURSE PRACTITIONER

## 2022-07-01 PROCEDURE — 84439 ASSAY OF FREE THYROXINE: CPT | Performed by: NURSE PRACTITIONER

## 2022-07-01 PROCEDURE — 2014F MENTAL STATUS ASSESS: CPT | Performed by: NURSE PRACTITIONER

## 2022-07-01 PROCEDURE — 80061 LIPID PANEL: CPT | Performed by: NURSE PRACTITIONER

## 2022-07-01 RX ORDER — OMEPRAZOLE 20 MG/1
20 CAPSULE, DELAYED RELEASE ORAL DAILY
Qty: 90 CAPSULE | Refills: 0 | Status: SHIPPED | OUTPATIENT
Start: 2022-07-01 | End: 2022-12-28

## 2022-07-01 RX ORDER — RIFAXIMIN 550 MG/1
550 TABLET ORAL 3 TIMES DAILY
COMMUNITY
Start: 2022-06-20

## 2022-07-01 NOTE — PROGRESS NOTES
"  Female Physical Note      Patient Name: Iliana Barriga  : 1955   MRN: [id number]     Chief Complaint:    Chief Complaint   Patient presents with   • Annual Exam     With PAP       History of Present Illness: Iliana Barriga is a 67 y.o. female who is here today for their annual health maintenance and physical. ***      Subjective     Review of System: Review of Systems   Constitutional: Negative for fatigue and fever.   HENT: Negative for congestion and rhinorrhea.    Respiratory: Negative for shortness of breath and wheezing.    Cardiovascular: Negative for chest pain and palpitations.   Gastrointestinal: Positive for abdominal pain (epigastric ). Negative for diarrhea, nausea and vomiting.        GERD   Genitourinary: Negative for dysuria and vaginal discharge.   Musculoskeletal: Negative for myalgias.   Skin: Negative for rash.   Neurological: Negative for headaches.   Hematological: Negative for adenopathy.   Psychiatric/Behavioral: Negative for dysphoric mood.    GYN ROS: No  pelvic pain or discharge\",\"no breast pain or new or enlarging lumps on self exam\"}. No neurological complaints.    I have reviewed the ROS documented by my clinical staff, updated appropriately and I agree. CECILY Aguila    Past Medical History:   Past Medical History:   Diagnosis Date   • Acquired hypothyroidism    • Cancer (HCC)    • Colon polyp    • Diverticulosis    • GERD (gastroesophageal reflux disease)    • History of uterine cancer     Tx - hysterectomy and LN's; no chemo; no XRT   • HL (hearing loss)    • Meniere disease    • Peptic ulceration        Past Surgical History:   Past Surgical History:   Procedure Laterality Date   • ABDOMINOPLASTY     • BLADDER SLING MODIFIED, ANTERIOR AND POSTERIOR VAGINAL REPAIR     • TOTAL ABDOMINAL HYSTERECTOMY SALPINGO OOPHORECTOMY PELVIC NODE DISSECTION  2015    [facility]. [facility] - Done for uterine cancer.  Along with bladder " suspension       Family History:   Family History   Problem Relation Age of Onset   • Thyroid disease Mother    • Arthritis Mother    • Thyroid disease Sister    • Uterine cancer Sister    • Thyroid disease Brother    • Heart disease Brother    • Breast cancer Maternal Grandmother        Social History:   Social History     Socioeconomic History   • Marital status:    Tobacco Use   • Smoking status: Passive Smoke Exposure - Never Smoker   • Smokeless tobacco: Never Used   Vaping Use   • Vaping Use: Never used   Substance and Sexual Activity   • Alcohol use: Yes     Alcohol/week: 4.0 - 8.0 standard drinks     Types: 2 - 4 Glasses of wine, 2 - 4 Standard drinks or equivalent per week   • Drug use: No   • Sexual activity: Not Currently       Medications:     Current Outpatient Medications:   •  albuterol sulfate  (90 Base) MCG/ACT inhaler, Inhale 2 puffs Every 6 (Six) Hours As Needed for Wheezing or Shortness of Air (cough)., Disp: 18 g, Rfl: 3  •  Atrovent HFA 17 MCG/ACT inhaler, Inhale 1 puff 2 (Two) Times a Day., Disp: , Rfl:   •  azelastine (ASTELIN) 0.1 % nasal spray, 2 sprays into the nostril(s) as directed by provider., Disp: , Rfl:   •  [employer] D3 1000 units capsule, Take  by mouth Daily., Disp: , Rfl: 1  •  cyclobenzaprine (FLEXERIL) 5 MG tablet, Take 1 tablet by mouth Every 8 (Eight) Hours., Disp: , Rfl:   •  estrogens, conjugated, (PREMARIN) 0.625 MG tablet, Take 1 tablet by mouth Daily., Disp: 90 tablet, Rfl: 0  •  fluticasone (Flonase Sensimist) 27.5 MCG/SPRAY nasal spray, 1 spray into the nostril(s) as directed by provider Daily for 30 days., Disp: 9.1 mL, Rfl: 3  •  levothyroxine (SYNTHROID, LEVOTHROID) 25 MCG tablet, Take 1 tablet by mouth Daily., Disp: 90 tablet, Rfl: 0  •  linaclotide (LINZESS) 72 MCG capsule capsule, Take 1 capsule by mouth Every Morning Before Breakfast., Disp: 90 capsule, Rfl: 0  •  Magnesium Oxide 500 MG tablet,  0 Refill(s), Disp: , Rfl:   •  Multiple Vitamins-Minerals  (MULTIVITAMIN ADULT EXTRA C PO), Take  by mouth., Disp: , Rfl:   •  omeprazole (priLOSEC) 20 MG capsule, Take 1 capsule by mouth Daily., Disp: 90 capsule, Rfl: 0  •  tretinoin (Retin-A) 0.025 % cream, Apply 1 application topically to the appropriate area as directed Every Night., Disp: , Rfl:   •  triamterene-hydrochlorothiazide (DYAZIDE) 37.5-25 MG per capsule, Take 1 capsule by mouth Every Morning., Disp: 90 capsule, Rfl: 0  •  Xifaxan 550 MG tablet, Take 550 mg by mouth 3 (Three) Times a Day., Disp: , Rfl:     Allergies:   Allergies   Allergen Reactions   • Sulfamethoxazole-Trimethoprim Itching, Swelling and Hives     Throat felt tight and arm swelled  Throat felt tight and arm swelled     • Penicillins Hives       Immunizations:    Td/Tdap(Booster Q 10 yrs):     Flu (Yearly): Advised yearly   Mammogram:  Discussed importance of screening for any malignancy early.   PSA(Over age 50):    Bone Density/DEXA:  N/A  Hep C: Screen per guidelines    Colorectal Screening:     Last Completed Colonoscopy          COLORECTAL CANCER SCREENING (COLONOSCOPY - Every 5 Years) Next due on 4/5/2024 04/05/2019  COLONOSCOPY (Done)    04/05/2019  SCANNED - COLONOSCOPY              Pap:  Post hysterectomy/uterine cancer 2019   Last Completed Pap Smear     This patient has no relevant Health Maintenance data.        Mammogram:    Last Completed Mammogram          Ordered - MAMMOGRAM (Every 2 Years) Ordered on 7/1/2022 07/21/2021  Mammo Screening Digital Tomosynthesis Bilateral With CAD                   Diet/Physical activity:***    Sexual Health:     Depression: PHQ-2 Depression Screening  Little interest or pleasure in doing things?  0   Feeling down, depressed, or hopeless?  0   PHQ-2 Total Score  0     Intimate partner violence: (Screen on initial visit, pregnant women, women with injuries, older adult with injury or evidence of neglect):  • Violence can be a problem in many people's lives, so I now ask every patient about  "trauma or abuse they may have experienced in a relationship.  • Stress/Safety - Do you feel safe in your relationship?  • Afraid/Abused - Have you ever been in a relationship where you were threatened, hurt, or afraid?  • Friend/Family - Are your friends aware you have been hurt?  • Emergency Plan - Do you have a safe place to go and the resources you need in an emergency?    Osteoporosis:   • Ost menopausal women < 65 with RF (advancing age, previous fracture, glucocorticoid therapy, parental hip fracture, low body weight, current cigarette smoking, excessive alcohol consumption, rheumatoid arthritis, secondary osteoporosis [hypogonadism/premature menopause, malabsorption, chronic liver disease, IBD]).  • All women 65 or older    Objective     Physical Exam:  Vital Signs:   Vitals:    07/01/22 0813   BP: 118/80   BP Location: Right arm   Patient Position: Sitting   Cuff Size: Adult   Pulse: 82   Resp: 16   Temp: 97 °F (36.1 °C)   TempSrc: Infrared   SpO2: 98%   Weight: 76.7 kg (169 lb 3.2 oz)   Height: 158.8 cm (62.5\")   PainSc:   2     Body mass index is 30.45 kg/m². {BMI is >= 30 and <35. (Class 1 Obesity). The following options were offered after discussion;:[id number]}      Physical Exam    Procedures    Assessment / Plan      Assessment/Plan:   Diagnoses and all orders for this visit:    1. Wellness examination (Primary)    2. Gastroesophageal reflux disease without esophagitis  -     omeprazole (priLOSEC) 20 MG capsule; Take 1 capsule by mouth Daily.  Dispense: 90 capsule; Refill: 0  -     Ambulatory referral for Screening EGD    3. Encounter for screening mammogram for breast cancer  -     Mammo Screening Digital Tomosynthesis Bilateral With CAD; Future    4. Pap smear of vagina with previous hysterectomy for cancer  -     LIQUID-BASED PAP SMEAR, [facility] ([facility])    5. Encounter for immunization  -     Pneumococcal Conjugate Vaccine 20-Valent (PCV20)         1. ***      Follow Up:   No follow-ups on " file.    Healthcare Maintenance:   Counseling provided on ***.   Health Maintenance, Female  Adopting a healthy lifestyle and getting preventive care can go a long way to promote health and wellness. Talk with your health care provider about what schedule of regular examinations is right for you. This is a good chance for you to check in with your provider about disease prevention and staying healthy.  In between checkups, there are plenty of things you can do on your own. Experts have done a lot of research about which lifestyle changes and preventive measures are most likely to keep you healthy. Ask your health care provider for more information.  Weight and diet  Eat a healthy diet  · Be sure to include plenty of vegetables, fruits, low-fat dairy products, and lean protein.  · Do not eat a lot of foods high in solid fats, added sugars, or salt.  · Get regular exercise. This is one of the most important things you can do for your health.  ? Most adults should exercise for at least 150 minutes each week. The exercise should increase your heart rate and make you sweat (moderate-intensity exercise).  ? Most adults should also do strengthening exercises at least twice a week. This is in addition to the moderate-intensity exercise.     Maintain a healthy weight  · Body mass index (BMI) is a measurement that can be used to identify possible weight problems. It estimates body fat based on height and weight. Your health care provider can help determine your BMI and help you achieve or maintain a healthy weight.  · For females 20 years of age and older:  ? A BMI below 18.5 is considered underweight.  ? A BMI of 18.5 to 24.9 is normal.  ? A BMI of 25 to 29.9 is considered overweight.  ? A BMI of 30 and above is considered obese.     Watch levels of cholesterol and blood lipids  · You should start having your blood tested for lipids and cholesterol at 20 years of age, then have this test every 5 years.  · You may need to  have your cholesterol levels checked more often if:  ? Your lipid or cholesterol levels are high.  ? You are older than 50 years of age.  ? You are at high risk for heart disease.     Cancer screening  Lung Cancer  · Lung cancer screening is recommended for adults 55-80 years old who are at high risk for lung cancer because of a history of smoking.  · A yearly low-dose CT scan of the lungs is recommended for people who:  ? Currently smoke.  ? Have quit within the past 15 years.  ? Have at least a 30-pack-year history of smoking. A pack year is smoking an average of one pack of cigarettes a day for 1 year.  · Yearly screening should continue until it has been 15 years since you quit.  · Yearly screening should stop if you develop a health problem that would prevent you from having lung cancer treatment.     Breast Cancer  · Practice breast self-awareness. This means understanding how your breasts normally appear and feel.  · It also means doing regular breast self-exams. Let your health care provider know about any changes, no matter how small.  · If you are in your 20s or 30s, you should have a clinical breast exam (CBE) by a health care provider every 1-3 years as part of a regular health exam.  · If you are 40 or older, have a CBE every year. Also consider having a breast X-ray (mammogram) every year.  · If you have a family history of breast cancer, talk to your health care provider about genetic screening.  · If you are at high risk for breast cancer, talk to your health care provider about having an MRI and a mammogram every year.  · Breast cancer gene (BRCA) assessment is recommended for women who have family members with BRCA-related cancers. BRCA-related cancers include:  ? Breast.  ? Ovarian.  ? Tubal.  ? Peritoneal cancers.  · Results of the assessment will determine the need for genetic counseling and BRCA1 and BRCA2 testing.     Cervical Cancer  Your health care provider may recommend that you be  screened regularly for cancer of the pelvic organs (ovaries, uterus, and vagina). This screening involves a pelvic examination, including checking for microscopic changes to the surface of your cervix (Pap test). You may be encouraged to have this screening done every 3 years, beginning at age 21.  · For women ages 30-65, health care providers may recommend pelvic exams and Pap testing every 3 years, or they may recommend the Pap and pelvic exam, combined with testing for human papilloma virus (HPV), every 5 years. Some types of HPV increase your risk of cervical cancer. Testing for HPV may also be done on women of any age with unclear Pap test results.  · Other health care providers may not recommend any screening for nonpregnant women who are considered low risk for pelvic cancer and who do not have symptoms. Ask your health care provider if a screening pelvic exam is right for you.  · If you have had past treatment for cervical cancer or a condition that could lead to cancer, you need Pap tests and screening for cancer for at least 20 years after your treatment. If Pap tests have been discontinued, your risk factors (such as having a new sexual partner) need to be reassessed to determine if screening should resume. Some women have medical problems that increase the chance of getting cervical cancer. In these cases, your health care provider may recommend more frequent screening and Pap tests.     Colorectal Cancer  · This type of cancer can be detected and often prevented.  · Routine colorectal cancer screening usually begins at 50 years of age and continues through 75 years of age.  · Your health care provider may recommend screening at an earlier age if you have risk factors for colon cancer.  · Your health care provider may also recommend using home test kits to check for hidden blood in the stool.  · A small camera at the end of a tube can be used to examine your colon directly (sigmoidoscopy or colonoscopy).  This is done to check for the earliest forms of colorectal cancer.  · Routine screening usually begins at age 50.  · Direct examination of the colon should be repeated every 5-10 years through 75 years of age. However, you may need to be screened more often if early forms of precancerous polyps or small growths are found.     Skin Cancer  · Check your skin from head to toe regularly.  · Tell your health care provider about any new moles or changes in moles, especially if there is a change in a mole's shape or color.  · Also tell your health care provider if you have a mole that is larger than the size of a pencil eraser.  · Always use sunscreen. Apply sunscreen liberally and repeatedly throughout the day.  · Protect yourself by wearing long sleeves, pants, a wide-brimmed hat, and sunglasses whenever you are outside.     Heart disease, diabetes, and high blood pressure  · High blood pressure causes heart disease and increases the risk of stroke. High blood pressure is more likely to develop in:  ? People who have blood pressure in the high end of the normal range (130-139/85-89 mm Hg).  ? People who are overweight or obese.  ? People who are .  · If you are 18-39 years of age, have your blood pressure checked every 3-5 years. If you are 40 years of age or older, have your blood pressure checked every year. You should have your blood pressure measured twice--once when you are at a hospital or clinic, and once when you are not at a hospital or clinic. Record the average of the two measurements. To check your blood pressure when you are not at a hospital or clinic, you can use:  ? An automated blood pressure machine at a pharmacy.  ? A home blood pressure monitor.  · If you are between 55 years and 79 years old, ask your health care provider if you should take aspirin to prevent strokes.  · Have regular diabetes screenings. This involves taking a blood sample to check your fasting blood sugar  level.  ? If you are at a normal weight and have a low risk for diabetes, have this test once every three years after 45 years of age.  ? If you are overweight and have a high risk for diabetes, consider being tested at a younger age or more often.  Preventing infection  Hepatitis B  · If you have a higher risk for hepatitis B, you should be screened for this virus. You are considered at high risk for hepatitis B if:  ? You were born in a country where hepatitis B is common. Ask your health care provider which countries are considered high risk.  ? Your parents were born in a high-risk country, and you have not been immunized against hepatitis B (hepatitis B vaccine).  ? You have HIV or AIDS.  ? You use needles to inject street drugs.  ? You live with someone who has hepatitis B.  ? You have had sex with someone who has hepatitis B.  ? You get hemodialysis treatment.  ? You take certain medicines for conditions, including cancer, organ transplantation, and autoimmune conditions.     Hepatitis C  · Blood testing is recommended for:  ? Everyone born from 1945 through 1965.  ? Anyone with known risk factors for hepatitis C.     Sexually transmitted infections (STIs)  · You should be screened for sexually transmitted infections (STIs) including gonorrhea and chlamydia if:  ? You are sexually active and are younger than 24 years of age.  ? You are older than 24 years of age and your health care provider tells you that you are at risk for this type of infection.  ? Your sexual activity has changed since you were last screened and you are at an increased risk for chlamydia or gonorrhea. Ask your health care provider if you are at risk.  · If you do not have HIV, but are at risk, it may be recommended that you take a prescription medicine daily to prevent HIV infection. This is called pre-exposure prophylaxis (PrEP). You are considered at risk if:  ? You are sexually active and do not regularly use condoms or know the HIV  status of your partner(s).  ? You take drugs by injection.  ? You are sexually active with a partner who has HIV.     Talk with your health care provider about whether you are at high risk of being infected with HIV. If you choose to begin PrEP, you should first be tested for HIV. You should then be tested every 3 months for as long as you are taking PrEP.  Pregnancy  · If you are premenopausal and you may become pregnant, ask your health care provider about preconception counseling.  · If you may become pregnant, take 400 to 800 micrograms (mcg) of folic acid every day.  · If you want to prevent pregnancy, talk to your health care provider about birth control (contraception).  Osteoporosis and menopause  · Osteoporosis is a disease in which the bones lose minerals and strength with aging. This can result in serious bone fractures. Your risk for osteoporosis can be identified using a bone density scan.  · If you are 65 years of age or older, or if you are at risk for osteoporosis and fractures, ask your health care provider if you should be screened.  · Ask your health care provider whether you should take a calcium or vitamin D supplement to lower your risk for osteoporosis.  · Menopause may have certain physical symptoms and risks.  · Hormone replacement therapy may reduce some of these symptoms and risks.  Talk to your health care provider about whether hormone replacement therapy is right for you.  Follow these instructions at home:  · Schedule regular health, dental, and eye exams.  · Stay current with your immunizations.  · Do not use any tobacco products including cigarettes, chewing tobacco, or electronic cigarettes.  · If you are pregnant, do not drink alcohol.  · If you are breastfeeding, limit how much and how often you drink alcohol.  · Limit alcohol intake to no more than 1 drink per day for nonpregnant women. One drink equals 12 ounces of beer, 5 ounces of wine, or 1½ ounces of hard liquor.  · Do not  use street drugs.  · Do not share needles.  · Ask your health care provider for help if you need support or information about quitting drugs.  · Tell your health care provider if you often feel depressed.  · Tell your health care provider if you have ever been abused or do not feel safe at home.  This information is not intended to replace advice given to you by your health care provider. Make sure you discuss any questions you have with your health care provider.  Document Released: 07/02/2012 Document Revised: 05/25/2017 Document Reviewed: 09/20/2016  [employer] Interactive Patient Education © 2018 [employer]  Explained and discussed patient's condition and plan of care.  Discussed when to follow-up.  Discussed possible red flags and how to follow-up with those.  Viewed patient's medications and discussed common side effects. Patient to continue current medications as advised.  Be compliant with medications. Patient to let me know if they have any worsening, no improvement, does not tolerate medication, or any future concerns about treatment. ER for emergencies.  Patient verbalized an understanding and agreement with plan of care.      Iliana Barriga voices understanding and acceptance of this advice and will call back with any further questions or concerns. AVS with preventive healthcare tips printed for patient.     CECILY gAuila  [facility]

## 2022-07-01 NOTE — PROGRESS NOTES
"  Female Physical Note      Patient Name: Iliana Barriga  : 1955   MRN: [id number]     Chief Complaint:    Chief Complaint   Patient presents with   • Annual Exam     With PAP       History of Present Illness: Iliana Barriga is a 67 y.o. female who is here today for their annual health maintenance and physical.    Are you currently seeing any other doctors or specialists?  Colorectal Surgeons  Sleep: 8 hours/ night   Regular dental visits: Yes, every 6 months  Regular eye exams: Yes, yearly, wears glasses       The patient reports GERD with epigastric pain, reflux and heartburn. She continue to cough  since having Covid. She denies unexplained weight loss or rectal bleeding. The GERD has aggravated with stress.  She does not smoke.  She has \"been eating Rolaids\". She has taken Prilosec 20 mg  Prn.  She had endoscopy in ? Or 2018?. She had an ulcer and hiatal hernia.         Subjective     Review of System:      No LMP recorded (lmp unknown). Patient has had a hysterectomy.    ROS:  Feeling well. No dyspnea or chest pain on exertion. + epigastric abdominal pain, no change in bowel habits, black or bloody stools.  No urinary tract symptoms.   GYN ROS: No  pelvic pain or discharge, no breast pain or new or enlarging lumps on self exam. No neurological complaints.      Past Medical History:   Past Medical History:   Diagnosis Date   • Acquired hypothyroidism    • Cancer (HCC)    • Colon polyp    • Diverticulosis    • GERD (gastroesophageal reflux disease)    • History of uterine cancer     Tx - hysterectomy and LN's; no chemo; no XRT   • HL (hearing loss)    • Meniere disease    • Peptic ulceration        Past Surgical History:   Past Surgical History:   Procedure Laterality Date   • ABDOMINOPLASTY     • BLADDER SLING MODIFIED, ANTERIOR AND POSTERIOR VAGINAL REPAIR     • TOTAL ABDOMINAL HYSTERECTOMY SALPINGO OOPHORECTOMY PELVIC NODE DISSECTION  2015     [facility] " [facility], [place], operated by [facility] - Done for uterine cancer.  Along with bladder suspension       Family History:   Family History   Problem Relation Age of Onset   • Thyroid disease Mother    • Arthritis Mother    • Thyroid disease Sister    • Uterine cancer Sister    • Thyroid disease Brother    • Heart disease Brother    • Breast cancer Maternal Grandmother        Social History:   Social History     Socioeconomic History   • Marital status:    Tobacco Use   • Smoking status: Passive Smoke Exposure - Never Smoker   • Smokeless tobacco: Never Used   Vaping Use   • Vaping Use: Never used   Substance and Sexual Activity   • Alcohol use: Yes     Alcohol/week: 4.0 - 8.0 standard drinks     Types: 2 - 4 Glasses of wine, 2 - 4 Standard drinks or equivalent per week   • Drug use: No   • Sexual activity: Not Currently       Medications:     Current Outpatient Medications:   •  albuterol sulfate  (90 Base) MCG/ACT inhaler, Inhale 2 puffs Every 6 (Six) Hours As Needed for Wheezing or Shortness of Air (cough)., Disp: 18 g, Rfl: 3  •  Atrovent HFA 17 MCG/ACT inhaler, Inhale 1 puff 2 (Two) Times a Day., Disp: , Rfl:   •  azelastine (ASTELIN) 0.1 % nasal spray, 2 sprays into the nostril(s) as directed by provider., Disp: , Rfl:   •  [employer] D3 1000 units capsule, Take  by mouth Daily., Disp: , Rfl: 1  •  cyclobenzaprine (FLEXERIL) 5 MG tablet, Take 1 tablet by mouth Every 8 (Eight) Hours., Disp: , Rfl:   •  estrogens, conjugated, (PREMARIN) 0.625 MG tablet, Take 1 tablet by mouth Daily., Disp: 90 tablet, Rfl: 0  •  fluticasone (Flonase Sensimist) 27.5 MCG/SPRAY nasal spray, 1 spray into the nostril(s) as directed by provider Daily for 30 days., Disp: 9.1 mL, Rfl: 3  •  levothyroxine (SYNTHROID, LEVOTHROID) 25 MCG tablet, Take 1 tablet by mouth Daily., Disp: 90 tablet, Rfl: 0  •  linaclotide (LINZESS) 72 MCG capsule capsule, Take 1 capsule by mouth Every Morning Before Breakfast., Disp: 90 capsule, Rfl: 0  •  Magnesium Oxide 500 MG tablet,  " 0 Refill(s), Disp: , Rfl:   •  Multiple Vitamins-Minerals (MULTIVITAMIN ADULT EXTRA C PO), Take  by mouth., Disp: , Rfl:   •  omeprazole (priLOSEC) 20 MG capsule, Take 1 capsule by mouth Daily., Disp: 90 capsule, Rfl: 0  •  tretinoin (Retin-A) 0.025 % cream, Apply 1 application topically to the appropriate area as directed Every Night., Disp: , Rfl:   •  triamterene-hydrochlorothiazide (DYAZIDE) 37.5-25 MG per capsule, Take 1 capsule by mouth Every Morning., Disp: 90 capsule, Rfl: 0  •  Xifaxan 550 MG tablet, Take 550 mg by mouth 3 (Three) Times a Day., Disp: , Rfl:     Allergies:   Allergies   Allergen Reactions   • Sulfamethoxazole-Trimethoprim Itching, Swelling and Hives     Throat felt tight and arm swelled  Throat felt tight and arm swelled     • Penicillins Hives       Immunizations:  Td/Tdap(Booster Q 10 yrs):  Up to date  Flu (Yearly):  Yearly  Colorectal Screening:     Last Completed Colonoscopy          COLORECTAL CANCER SCREENING (COLONOSCOPY - Every 5 Years) Next due on 4/5/2024 04/05/2019  COLONOSCOPY (Done)    04/05/2019  SCANNED - COLONOSCOPY              Pap:  Due  Last Completed Pap Smear     This patient has no relevant Health Maintenance data.        Mammogram:    Last Completed Mammogram          Ordered - MAMMOGRAM (Every 2 Years) Ordered on 7/1/2022 07/21/2021  Mammo Screening Digital Tomosynthesis Bilateral With CAD                 Bone Density/DEXA:Due 1/26/2024    Depression: PHQ-2 Depression Screening  Little interest or pleasure in doing things?  0   Feeling down, depressed, or hopeless?  0   PHQ-2 Total Score  0       Objective     Physical Exam:  Vital Signs:   Vitals:    07/01/22 0813   BP: 118/80   BP Location: Right arm   Patient Position: Sitting   Cuff Size: Adult   Pulse: 82   Resp: 16   Temp: 97 °F (36.1 °C)   TempSrc: Infrared   SpO2: 98%   Weight: 76.7 kg (169 lb 3.2 oz)   Height: 158.8 cm (62.5\")   PainSc:   2     Body mass index is 30.45 kg/m². BMI is >= 30 and <35. " (Class 1 Obesity). The following options were offered after discussion;: exercise counseling/recommendations and nutrition counseling/recommendations      Physical Exam  Constitutional:       General: She is not in acute distress.     Appearance: She is not ill-appearing.   HENT:      Head: Normocephalic.   Cardiovascular:      Rate and Rhythm: Normal rate and regular rhythm.      Heart sounds: Normal heart sounds. No murmur heard.  Pulmonary:      Breath sounds: Normal breath sounds.   Abdominal:      General: Bowel sounds are normal.      Tenderness: There is no abdominal tenderness.   Musculoskeletal:         General: No swelling or tenderness. Normal range of motion.   Lymphadenopathy:      Cervical: No cervical adenopathy.   Skin:     General: Skin is warm and dry.   Neurological:      General: No focal deficit present.      Mental Status: She is oriented to person, place, and time.   Psychiatric:         Mood and Affect: Mood normal.       BREAST EXAM: breasts appear large and dense;  no suspicious masses, no skin or nipple changes or axillary nodes    PELVIC EXAM:   VULVA: normal appearing vulva with no masses, tenderness or lesions  VAGINA: normal appearing vagina with normal color and discharge, no lesions  CERVIX: surgically absent  UTERUS: surgically absent, vaginal cuff well healed  ADNEXA: surgically absent    RECTAL: normal rectal, no masses, deferred due to colonoscopy, exam chaperoned by Shy Xiao LPN.       Assessment / Plan      Assessment/Plan:   Diagnoses and all orders for this visit:    1. Wellness examination (Primary)  -     Comprehensive Metabolic Panel; Future  -     CBC & Differential; Future  -     Comprehensive Metabolic Panel  -     CBC & Differential    2. Gastroesophageal reflux disease without esophagitis  -     omeprazole (priLOSEC) 20 MG capsule; Take 1 capsule by mouth Daily.  Dispense: 90 capsule; Refill: 0  -     Ambulatory referral for Screening EGD    3. Encounter for  screening mammogram for breast cancer  -     Mammo Screening Digital Tomosynthesis Bilateral With CAD; Future    4. Pap smear of vagina with previous hysterectomy for cancer  -     Cancel: LIQUID-BASED PAP SMEAR, [facility] ([facility])  -     LIQUID-BASED PAP SMEAR, [facility] ([facility])    5. Encounter for immunization  -     Pneumococcal Conjugate Vaccine 20-Valent (PCV20)    6. Encounter for lipid screening for cardiovascular disease  -     Lipid Panel; Future  -     Lipid Panel    7. Hypothyroidism, unspecified type  -     TSH; Future  -     T4, free; Future  -     TSH  -     T4, free    8. Vitamin D deficiency  -     Vitamin D 25 hydroxy; Future  -     Vitamin D 25 hydroxy             Follow Up:   2 months  Healthcare Maintenance:   Counseling provided on     Health Maintenance, Female  Adopting a healthy lifestyle and getting preventive care can go a long way to promote health and wellness. Talk with your health care provider about what schedule of regular examinations is right for you. This is a good chance for you to check in with your provider about disease prevention and staying healthy.  In between checkups, there are plenty of things you can do on your own. Experts have done a lot of research about which lifestyle changes and preventive measures are most likely to keep you healthy. Ask your health care provider for more information.  Weight and diet  Eat a healthy diet  Be sure to include plenty of vegetables, fruits, low-fat dairy products, and lean protein.  Do not eat a lot of foods high in solid fats, added sugars, or salt.  Get regular exercise. This is one of the most important things you can do for your health.  Most adults should exercise for at least 150 minutes each week. The exercise should increase your heart rate and make you sweat (moderate-intensity exercise).  Most adults should also do strengthening exercises at least twice a week. This is in addition to the moderate-intensity  exercise.     Maintain a healthy weight  Body mass index (BMI) is a measurement that can be used to identify possible weight problems. It estimates body fat based on height and weight. Your health care provider can help determine your BMI and help you achieve or maintain a healthy weight.  For females 20 years of age and older:  A BMI below 18.5 is considered underweight.  A BMI of 18.5 to 24.9 is normal.  A BMI of 25 to 29.9 is considered overweight.  A BMI of 30 and above is considered obese.     Watch levels of cholesterol and blood lipids  You should start having your blood tested for lipids and cholesterol at 20 years of age, then have this test every 5 years.  You may need to have your cholesterol levels checked more often if:  Your lipid or cholesterol levels are high.  You are older than 50 years of age.  You are at high risk for heart disease.     Cancer screening  Lung Cancer  Lung cancer screening is recommended for adults 55-80 years old who are at high risk for lung cancer because of a history of smoking.  A yearly low-dose CT scan of the lungs is recommended for people who:  Currently smoke.  Have quit within the past 15 years.  Have at least a 30-pack-year history of smoking. A pack year is smoking an average of one pack of cigarettes a day for 1 year.  Yearly screening should continue until it has been 15 years since you quit.  Yearly screening should stop if you develop a health problem that would prevent you from having lung cancer treatment.     Breast Cancer  Practice breast self-awareness. This means understanding how your breasts normally appear and feel.  It also means doing regular breast self-exams. Let your health care provider know about any changes, no matter how small.  If you are in your 20s or 30s, you should have a clinical breast exam (CBE) by a health care provider every 1-3 years as part of a regular health exam.  If you are 40 or older, have a CBE every year. Also consider having  a breast X-ray (mammogram) every year.  If you have a family history of breast cancer, talk to your health care provider about genetic screening.  If you are at high risk for breast cancer, talk to your health care provider about having an MRI and a mammogram every year.  Breast cancer gene (BRCA) assessment is recommended for women who have family members with BRCA-related cancers. BRCA-related cancers include:  Breast.  Ovarian.  Tubal.  Peritoneal cancers.  Results of the assessment will determine the need for genetic counseling and BRCA1 and BRCA2 testing.     Cervical Cancer  Your health care provider may recommend that you be screened regularly for cancer of the pelvic organs (ovaries, uterus, and vagina). This screening involves a pelvic examination, including checking for microscopic changes to the surface of your cervix (Pap test). You may be encouraged to have this screening done every 3 years, beginning at age 21.  For women ages 30-65, health care providers may recommend pelvic exams and Pap testing every 3 years, or they may recommend the Pap and pelvic exam, combined with testing for human papilloma virus (HPV), every 5 years. Some types of HPV increase your risk of cervical cancer. Testing for HPV may also be done on women of any age with unclear Pap test results.  Other health care providers may not recommend any screening for nonpregnant women who are considered low risk for pelvic cancer and who do not have symptoms. Ask your health care provider if a screening pelvic exam is right for you.  If you have had past treatment for cervical cancer or a condition that could lead to cancer, you need Pap tests and screening for cancer for at least 20 years after your treatment. If Pap tests have been discontinued, your risk factors (such as having a new sexual partner) need to be reassessed to determine if screening should resume. Some women have medical problems that increase the chance of getting cervical  cancer. In these cases, your health care provider may recommend more frequent screening and Pap tests.     Colorectal Cancer  This type of cancer can be detected and often prevented.  Routine colorectal cancer screening usually begins at 50 years of age and continues through 75 years of age.  Your health care provider may recommend screening at an earlier age if you have risk factors for colon cancer.  Your health care provider may also recommend using home test kits to check for hidden blood in the stool.  A small camera at the end of a tube can be used to examine your colon directly (sigmoidoscopy or colonoscopy). This is done to check for the earliest forms of colorectal cancer.  Routine screening usually begins at age 50.  Direct examination of the colon should be repeated every 5-10 years through 75 years of age. However, you may need to be screened more often if early forms of precancerous polyps or small growths are found.     Skin Cancer  Check your skin from head to toe regularly.  Tell your health care provider about any new moles or changes in moles, especially if there is a change in a mole's shape or color.  Also tell your health care provider if you have a mole that is larger than the size of a pencil eraser.  Always use sunscreen. Apply sunscreen liberally and repeatedly throughout the day.  Protect yourself by wearing long sleeves, pants, a wide-brimmed hat, and sunglasses whenever you are outside.     Heart disease, diabetes, and high blood pressure  High blood pressure causes heart disease and increases the risk of stroke. High blood pressure is more likely to develop in:  People who have blood pressure in the high end of the normal range (130-139/85-89 mm Hg).  People who are overweight or obese.  People who are .  If you are 18-39 years of age, have your blood pressure checked every 3-5 years. If you are 40 years of age or older, have your blood pressure checked every year. You  should have your blood pressure measured twice--once when you are at a hospital or clinic, and once when you are not at a hospital or clinic. Record the average of the two measurements. To check your blood pressure when you are not at a hospital or clinic, you can use:  An automated blood pressure machine at a pharmacy.  A home blood pressure monitor.  If you are between 55 years and 79 years old, ask your health care provider if you should take aspirin to prevent strokes.  Have regular diabetes screenings. This involves taking a blood sample to check your fasting blood sugar level.  If you are at a normal weight and have a low risk for diabetes, have this test once every three years after 45 years of age.  If you are overweight and have a high risk for diabetes, consider being tested at a younger age or more often.  Preventing infection  Hepatitis B  If you have a higher risk for hepatitis B, you should be screened for this virus. You are considered at high risk for hepatitis B if:  You were born in a country where hepatitis B is common. Ask your health care provider which countries are considered high risk.  Your parents were born in a high-risk country, and you have not been immunized against hepatitis B (hepatitis B vaccine).  You have HIV or AIDS.  You use needles to inject street drugs.  You live with someone who has hepatitis B.  You have had sex with someone who has hepatitis B.  You get hemodialysis treatment.  You take certain medicines for conditions, including cancer, organ transplantation, and autoimmune conditions.     Hepatitis C  Blood testing is recommended for:  Everyone born from 1945 through 1965.  Anyone with known risk factors for hepatitis C.     Sexually transmitted infections (STIs)  You should be screened for sexually transmitted infections (STIs) including gonorrhea and chlamydia if:  You are sexually active and are younger than 24 years of age.  You are older than 24 years of age and your  health care provider tells you that you are at risk for this type of infection.  Your sexual activity has changed since you were last screened and you are at an increased risk for chlamydia or gonorrhea. Ask your health care provider if you are at risk.  If you do not have HIV, but are at risk, it may be recommended that you take a prescription medicine daily to prevent HIV infection. This is called pre-exposure prophylaxis (PrEP). You are considered at risk if:  You are sexually active and do not regularly use condoms or know the HIV status of your partner(s).  You take drugs by injection.  You are sexually active with a partner who has HIV.     Talk with your health care provider about whether you are at high risk of being infected with HIV. If you choose to begin PrEP, you should first be tested for HIV. You should then be tested every 3 months for as long as you are taking PrEP.  Pregnancy  If you are premenopausal and you may become pregnant, ask your health care provider about preconception counseling.  If you may become pregnant, take 400 to 800 micrograms (mcg) of folic acid every day.  If you want to prevent pregnancy, talk to your health care provider about birth control (contraception).  Osteoporosis and menopause  Osteoporosis is a disease in which the bones lose minerals and strength with aging. This can result in serious bone fractures. Your risk for osteoporosis can be identified using a bone density scan.  If you are 65 years of age or older, or if you are at risk for osteoporosis and fractures, ask your health care provider if you should be screened.  Ask your health care provider whether you should take a calcium or vitamin D supplement to lower your risk for osteoporosis.  Menopause may have certain physical symptoms and risks.  Hormone replacement therapy may reduce some of these symptoms and risks.  Talk to your health care provider about whether hormone replacement therapy is right for  you.  Follow these instructions at home:  Schedule regular health, dental, and eye exams.  Stay current with your immunizations.  Do not use any tobacco products including cigarettes, chewing tobacco, or electronic cigarettes.  If you are pregnant, do not drink alcohol.  If you are breastfeeding, limit how much and how often you drink alcohol.  Limit alcohol intake to no more than 1 drink per day for nonpregnant women. One drink equals 12 ounces of beer, 5 ounces of wine, or 1½ ounces of hard liquor.  Do not use street drugs.  Do not share needles.  Ask your health care provider for help if you need support or information about quitting drugs.  Tell your health care provider if you often feel depressed.  Tell your health care provider if you have ever been abused or do not feel safe at home.  This information is not intended to replace advice given to you by your health care provider. Make sure you discuss any questions you have with your health care provider.  Document Released: 07/02/2012 Document Revised: 05/25/2017 Document Reviewed: 09/20/2016  [employer] Interactive Patient Education © 2018 [employer]  Iliana Barriga voices understanding and acceptance of this advice and will call back with any further questions or concerns. AVS with preventive healthcare tips printed for patient.     CECILY Aguila  [facility]

## 2022-07-05 DIAGNOSIS — N64.4 SORENESS BREAST: Primary | ICD-10-CM

## 2022-07-07 LAB — REF LAB TEST METHOD: NORMAL

## 2022-07-20 DIAGNOSIS — H81.02 MENIERE'S DISEASE OF LEFT EAR: ICD-10-CM

## 2022-07-20 DIAGNOSIS — E03.9 HYPOTHYROIDISM, UNSPECIFIED TYPE: ICD-10-CM

## 2022-07-20 RX ORDER — LEVOTHYROXINE SODIUM 0.03 MG/1
TABLET ORAL
Qty: 90 TABLET | Refills: 0 | Status: SHIPPED | OUTPATIENT
Start: 2022-07-20 | End: 2022-08-10

## 2022-07-20 RX ORDER — TRIAMTERENE AND HYDROCHLOROTHIAZIDE 37.5; 25 MG/1; MG/1
CAPSULE ORAL
Qty: 90 CAPSULE | Refills: 0 | Status: SHIPPED | OUTPATIENT
Start: 2022-07-20 | End: 2022-08-10

## 2022-08-05 ENCOUNTER — HOSPITAL ENCOUNTER (OUTPATIENT)
Dept: MAMMOGRAPHY | Facility: HOSPITAL | Age: 67
Discharge: HOME | End: 2022-08-05

## 2022-08-05 ENCOUNTER — HOSPITAL ENCOUNTER (OUTPATIENT)
Dept: ULTRASOUND IMAGING | Facility: HOSPITAL | Age: 67
Discharge: HOME | End: 2022-08-05

## 2022-08-05 DIAGNOSIS — N64.4 SORENESS BREAST: ICD-10-CM

## 2022-08-05 PROCEDURE — G0279 TOMOSYNTHESIS, MAMMO: HCPCS

## 2022-08-05 PROCEDURE — 76642 ULTRASOUND BREAST LIMITED: CPT | Performed by: RADIOLOGY

## 2022-08-05 PROCEDURE — 76642 ULTRASOUND BREAST LIMITED: CPT

## 2022-08-05 PROCEDURE — 77066 DX MAMMO INCL CAD BI: CPT

## 2022-08-05 PROCEDURE — 77066 DX MAMMO INCL CAD BI: CPT | Performed by: RADIOLOGY

## 2022-08-05 PROCEDURE — G0279 TOMOSYNTHESIS, MAMMO: HCPCS | Performed by: RADIOLOGY

## 2022-08-10 DIAGNOSIS — E03.9 HYPOTHYROIDISM, UNSPECIFIED TYPE: ICD-10-CM

## 2022-08-10 DIAGNOSIS — H81.02 MENIERE'S DISEASE OF LEFT EAR: ICD-10-CM

## 2022-08-10 RX ORDER — TRIAMTERENE AND HYDROCHLOROTHIAZIDE 37.5; 25 MG/1; MG/1
CAPSULE ORAL
Qty: 90 CAPSULE | Refills: 0 | Status: SHIPPED | OUTPATIENT
Start: 2022-08-10 | End: 2022-12-16

## 2022-08-10 RX ORDER — LEVOTHYROXINE SODIUM 0.03 MG/1
TABLET ORAL
Qty: 90 TABLET | Refills: 0 | Status: SHIPPED | OUTPATIENT
Start: 2022-08-10 | End: 2022-08-17

## 2022-08-10 NOTE — TELEPHONE ENCOUNTER
Rx Refill Note  Requested Prescriptions     Pending Prescriptions Disp Refills   • triamterene-hydrochlorothiazide (DYAZIDE) 37.5-25 MG per capsule [Pharmacy Med Name: TRIAMTERENE 37.5MG/ HCTZ 25MG CAPS] 90 capsule 0     Sig: TAKE 1 CAPSULE BY MOUTH EVERY MORNING   • levothyroxine (SYNTHROID, LEVOTHROID) 25 MCG tablet [Pharmacy Med Name: LEVOTHYROXINE 0.025MG (25MCG) TAB] 90 tablet 0     Sig: TAKE 1 TABLET BY MOUTH DAILY      Last office visit with prescribing clinician: 7/1/2022      Next office visit with prescribing clinician: 9/2/2022 7/1/2022    Christine Cuevas MA  08/10/22, 14:40 EDT

## 2022-08-15 RX ORDER — LEVOTHYROXINE SODIUM 0.03 MG/1
25 TABLET ORAL DAILY
Qty: 90 TABLET | Refills: 0 | OUTPATIENT
Start: 2022-08-15

## 2022-08-15 NOTE — TELEPHONE ENCOUNTER
"Patient is asking to resend Rx levothyroxine (SYNTHROID, LEVOTHROID) 25 MCG tablet as brand medication and please buster on Rx \"no substitution allowed\" and send it to [facility] pharmacy on [address], please.  "

## 2022-08-17 ENCOUNTER — TELEPHONE (OUTPATIENT)
Dept: INTERNAL MEDICINE | Facility: CLINIC | Age: 67
End: 2022-08-17

## 2022-08-17 DIAGNOSIS — E03.9 HYPOTHYROIDISM, UNSPECIFIED TYPE: Primary | ICD-10-CM

## 2022-08-17 RX ORDER — LEVOTHYROXINE SODIUM 0.03 MG/1
25 TABLET ORAL DAILY
Qty: 90 TABLET | Refills: 1 | Status: SHIPPED | OUTPATIENT
Start: 2022-08-17 | End: 2022-08-26 | Stop reason: SDUPTHER

## 2022-08-17 NOTE — TELEPHONE ENCOUNTER
We sent in Levothyroxine 25mcg tablets 1 po QD to pharmacy,   Patient needs name brand. Please send in name brand

## 2022-08-19 DIAGNOSIS — E03.9 HYPOTHYROIDISM, UNSPECIFIED TYPE: ICD-10-CM

## 2022-08-19 RX ORDER — LEVOTHYROXINE SODIUM 0.03 MG/1
25 TABLET ORAL DAILY
Qty: 90 TABLET | Refills: 1 | OUTPATIENT
Start: 2022-08-19

## 2022-08-19 NOTE — TELEPHONE ENCOUNTER
Caller: Iliana Barriga    Relationship: Self    Best call back number:[phone]    Requested Prescriptions:   Requested Prescriptions     Pending Prescriptions Disp Refills   • levothyroxine (Synthroid) 25 MCG tablet 90 tablet 1     Sig: Take 1 tablet by mouth Daily.        Pharmacy where request should be sent: [facility]Y #[id number] - [address] [phone] PH - [phone] FX     Additional details provided by patient: PATIENT STATED THAT [facility] FILLED HER PRESCRIPTION WITH GENERIC SYNTHROID AND SHE CANNOT TAKE THE GENERIC SO THE PATIENT NEEDS A PRESCRIPTION THAT STATES NO SUBSTITUTIONS. SECOND REQUEST OF THIS REFILL.     Does the patient have less than a 3 day supply:  [x] Yes  [] No    Chele Hitchcock Rep   08/19/22 13:28 EDT

## 2022-08-26 DIAGNOSIS — E03.9 HYPOTHYROIDISM, UNSPECIFIED TYPE: ICD-10-CM

## 2022-08-26 PROCEDURE — 87086 URINE CULTURE/COLONY COUNT: CPT | Performed by: NURSE PRACTITIONER

## 2022-08-26 RX ORDER — LEVOTHYROXINE SODIUM 0.03 MG/1
25 TABLET ORAL DAILY
Qty: 90 TABLET | Refills: 1 | Status: SHIPPED | OUTPATIENT
Start: 2022-08-26 | End: 2022-12-28

## 2022-08-26 NOTE — TELEPHONE ENCOUNTER
This patient called and is needing the name brand synthroid sent to [facility]. She is needing a note to the pharmacy that is saying she needs the name brand with no substitutions. Her endocrinologist told her not to switch to a substitution.

## 2022-09-02 ENCOUNTER — OFFICE VISIT (OUTPATIENT)
Dept: INTERNAL MEDICINE | Facility: CLINIC | Age: 67
End: 2022-09-02

## 2022-09-02 ENCOUNTER — LAB (OUTPATIENT)
Dept: LAB | Facility: HOSPITAL | Age: 67
End: 2022-09-02

## 2022-09-02 VITALS
TEMPERATURE: 98 F | OXYGEN SATURATION: 98 % | DIASTOLIC BLOOD PRESSURE: 76 MMHG | BODY MASS INDEX: 29.62 KG/M2 | SYSTOLIC BLOOD PRESSURE: 110 MMHG | HEIGHT: 63 IN | WEIGHT: 167.2 LBS | RESPIRATION RATE: 16 BRPM | HEART RATE: 60 BPM

## 2022-09-02 DIAGNOSIS — E03.9 HYPOTHYROIDISM, UNSPECIFIED TYPE: Primary | ICD-10-CM

## 2022-09-02 DIAGNOSIS — I10 PRIMARY HYPERTENSION: ICD-10-CM

## 2022-09-02 DIAGNOSIS — K21.9 GASTROESOPHAGEAL REFLUX DISEASE WITHOUT ESOPHAGITIS: ICD-10-CM

## 2022-09-02 DIAGNOSIS — N76.0 ACUTE VAGINITIS: ICD-10-CM

## 2022-09-02 PROCEDURE — 87661 TRICHOMONAS VAGINALIS AMPLIF: CPT | Performed by: NURSE PRACTITIONER

## 2022-09-02 PROCEDURE — 87591 N.GONORRHOEAE DNA AMP PROB: CPT | Performed by: NURSE PRACTITIONER

## 2022-09-02 PROCEDURE — 87801 DETECT AGNT MULT DNA AMPLI: CPT | Performed by: NURSE PRACTITIONER

## 2022-09-02 PROCEDURE — 87798 DETECT AGENT NOS DNA AMP: CPT | Performed by: NURSE PRACTITIONER

## 2022-09-02 PROCEDURE — 99214 OFFICE O/P EST MOD 30 MIN: CPT | Performed by: NURSE PRACTITIONER

## 2022-09-02 PROCEDURE — 87491 CHLMYD TRACH DNA AMP PROBE: CPT | Performed by: NURSE PRACTITIONER

## 2022-09-02 RX ORDER — IPRATROPIUM BROMIDE 17 UG/1
1 AEROSOL, METERED RESPIRATORY (INHALATION) 2 TIMES DAILY
Qty: 1 EACH | Refills: 3 | Status: SHIPPED | OUTPATIENT
Start: 2022-09-02

## 2022-09-02 RX ORDER — FLUCONAZOLE 150 MG/1
150 TABLET ORAL ONCE
Qty: 1 TABLET | Refills: 0 | Status: SHIPPED | OUTPATIENT
Start: 2022-09-02 | End: 2022-09-02

## 2022-09-02 NOTE — PROGRESS NOTES
Patient Name: Iliana Barriga  : 1955   MRN: [id number]     Chief Complaint:    Chief Complaint       hypertension            History of Present Illness: Iliana Barriga is a 67 y.o. female who is here for follow-up:    Hypertension  The patient denies headaches, chest pain, dyspnea, edema, syncope,  blurred vision or palpitations. They state that they are taking their medication as prescribed. They are not having medication side effects.    GERD  Symptoms improved on prilosec 20 mg . She had endoscopy and colonoscopy 8/31/22. Results pending.     She c/o vaginal odor.     Hypothyroidism  Her  energy level is good. No hair loss, heat intolerance, cold intolerance, diarrhea, constipation or sweats. She is taking her medication as prescribed.      Subjective     Review of System:    Review of Systems   Constitutional: Negative for activity change, appetite change and fever.  No change in weight.  HENT: Negative for congestion, ear pain, rhinorrhea and sore throat.    Eyes: Negative for discharge and visual disturbance.   Respiratory: Negative for cough, wheezing and shortness of breath.    Cardiovascular: Negative for chest pain or palpitations.   Gastrointestinal: Negative for abdominal distention, abdominal pain, blood in stool, diarrhea and vomiting; no change in bowel habits.   Endocrine: Negative for polyuria.   Genitourinary: Negative for difficulty urinating and dysuria. +vaginal odor  Musculoskeletal: Negative for neck pain and neck stiffness; no back pain; no joint pain  Skin: Negative for rash.   Neurological: Negative for headache numbness and tingling.   Hematological: Negative for adenopathy.   Psychiatric/Behavioral: Negative for  depression and anxiety        Medications:     Current Outpatient Medications:   •  albuterol sulfate  (90 Base) MCG/ACT inhaler, Inhale 2 puffs Every 6 (Six) Hours As Needed for Wheezing or Shortness of Air (cough)., Disp: 18 g, Rfl: 3  •  Atrovent HFA 17 MCG/ACT inhaler, Inhale 1 puff 2 (Two) Times a Day., Disp: 1 each, Rfl: 3  •  azelastine (ASTELIN) 0.1 % nasal spray, 2 sprays into the nostril(s) as directed by provider., Disp: , Rfl:   •  [employer] D3 1000 units capsule, Take  by mouth Daily., Disp: , Rfl: 1  •  cyclobenzaprine (FLEXERIL) 5 MG tablet, Take 1 tablet by  mouth Every 8 (Eight) Hours., Disp: , Rfl:   •  estrogens, conjugated, (PREMARIN) 0.625 MG tablet, Take 1 tablet by mouth Daily., Disp: 90 tablet, Rfl: 0  •  fluticasone (Flonase Sensimist) 27.5 MCG/SPRAY nasal spray, 1 spray into the nostril(s) as directed by provider Daily for 30 days., Disp: 9.1 mL, Rfl: 3  •  levothyroxine (Synthroid) 25 MCG tablet, Take 1 tablet by mouth Daily. Has to be on Brand name only. She CAN NOT use generic. Ok to give new script, Disp: 90 tablet, Rfl: 1  •  Magnesium Oxide 500 MG tablet,  0 Refill(s), Disp: , Rfl:   •  Multiple Vitamins-Minerals (MULTIVITAMIN ADULT EXTRA C PO), Take  by mouth., Disp: , Rfl:   •  omeprazole (priLOSEC) 20 MG capsule, Take 1 capsule by mouth Daily., Disp: 90 capsule, Rfl: 0  •  tretinoin (Retin-A) 0.025 % cream, Apply 1 application topically to the appropriate area as directed Every Night., Disp: 1 each, Rfl: 3  •  triamterene-hydrochlorothiazide (DYAZIDE) 37.5-25 MG per capsule, TAKE 1 CAPSULE BY MOUTH EVERY MORNING, Disp: 90 capsule, Rfl: 0  •  Xifaxan 550 MG tablet, Take 550 mg by mouth 3 (Three) Times a Day., Disp: , Rfl:   •  linaclotide (LINZESS) 72 MCG capsule capsule, Take 1 capsule by mouth Every Morning Before Breakfast., Disp: 90 capsule, Rfl: 0        Review of Systems   Constitutional: Negative for fatigue and fever.   HENT: Negative for congestion, ear pain and rhinorrhea.    Respiratory: Negative for cough, shortness of breath and wheezing.    Cardiovascular: Negative for chest pain and palpitations.   Gastrointestinal: Negative for abdominal pain, diarrhea, nausea and vomiting.   Genitourinary: Positive for vaginal discharge (odor). Negative for dysuria.   Hematological: Negative for adenopathy.     Physical Exam  Constitutional:       General: She is not in acute distress.     Appearance: She is not ill-appearing.   HENT:      Head: Normocephalic.   Cardiovascular:      Rate and Rhythm: Normal rate and regular rhythm.      Heart sounds:  Normal heart sounds. No murmur heard.  Pulmonary:      Breath sounds: Normal breath sounds.   Abdominal:      General: Bowel sounds are normal.      Tenderness: There is no abdominal tenderness.   Musculoskeletal:         General: No tenderness.   Lymphadenopathy:      Cervical: No cervical adenopathy.   Neurological:      General: No focal deficit present.      Mental Status: She is oriented to person, place, and time.   Psychiatric:         Mood and Affect: Mood normal.       Assessment & Plan    1. Hypertension  Continue current medications    2. Acute vaginitis  Nuswab  Diflucan 150 mg once and repeat in 72 hours     3. GERD  Continue current medications    4. Hypothyroidism.  Continue current medications

## 2022-09-05 NOTE — PROGRESS NOTES
Patient Name: Iliana Barriga  : 1955   MRN: [id number]     Chief Complaint:    Chief Complaint   Patient presents with   • Hypothyroidism      2 MONTH FOLLOW UP       History of Present Illness: Iliana Barriga is a 67 y.o. female presents to clinic    Chief Complaint:    Chief Complaint       hypothyroidism            History of Present Illness: Iliana Barriga is a 67 y.o. female who is here for follow-up:      Hypothyroidism  Her  energy level is good. No hair loss, heat intolerance, cold intolerance, diarrhea, constipation or sweats. She is taking her medication as prescribed.    Hypertension  The patient denies headaches, chest pain, dyspnea, edema, syncope, blurred vision or palpitations. They state that they are taking their medication as prescribed. They are not having medication side effects.  GERD  Symptoms improved on prilosec 20 mg . She had endoscopy and colonoscopy 22. Results pending.     She c/o vaginal odor.         Subjective             Review of System:     Review of Systems   Constitutional: Negative for activity change, appetite change and fever.  No change in weight.  Respiratory: Negative for cough, wheezing and shortness of breath.    Cardiovascular: Negative for chest pain or palpitations.   Gastrointestinal: Negative for abdominal distention, abdominal pain, blood in stool, diarrhea and vomiting; no change in bowel habits.   Genitourinary: Negative for difficulty urinating and dysuria. +vaginal odor  Psychiatric/Behavioral: Negative for  depression and anxiety      Medications:     Current Outpatient Medications:   •  albuterol sulfate  (90 Base) MCG/ACT inhaler, Inhale 2 puffs Every 6 (Six) Hours As Needed for Wheezing or Shortness of Air (cough)., Disp: 18 g, Rfl: 3  •  Atrovent HFA 17 MCG/ACT inhaler, Inhale 1 puff 2 (Two) Times a Day., Disp: 1 each, Rfl: 3  •  azelastine (ASTELIN) 0.1 % nasal spray, 2 sprays into the nostril(s) as directed by  "provider., Disp: , Rfl:   •  CVS D3 1000 units capsule, Take  by mouth Daily., Disp: , Rfl: 1  •  cyclobenzaprine (FLEXERIL) 5 MG tablet, Take 1 tablet by mouth Every 8 (Eight) Hours., Disp: , Rfl:   •  estrogens, conjugated, (PREMARIN) 0.625 MG tablet, Take 1 tablet by mouth Daily., Disp: 90 tablet, Rfl: 0  •  fluticasone (Flonase Sensimist) 27.5 MCG/SPRAY nasal spray, 1 spray into the nostril(s) as directed by provider Daily for 30 days., Disp: 9.1 mL, Rfl: 3  •  levothyroxine (Synthroid) 25 MCG tablet, Take 1 tablet by mouth Daily. Has to be on Brand name only. She CAN NOT use generic. Ok to give new script, Disp: 90 tablet, Rfl: 1  •  Magnesium Oxide 500 MG tablet,  0 Refill(s), Disp: , Rfl:   •  Multiple Vitamins-Minerals (MULTIVITAMIN ADULT EXTRA C PO), Take  by mouth., Disp: , Rfl:   •  omeprazole (priLOSEC) 20 MG capsule, Take 1 capsule by mouth Daily., Disp: 90 capsule, Rfl: 0  •  tretinoin (Retin-A) 0.025 % cream, Apply 1 application topically to the appropriate area as directed Every Night., Disp: 1 each, Rfl: 3  •  triamterene-hydrochlorothiazide (DYAZIDE) 37.5-25 MG per capsule, TAKE 1 CAPSULE BY MOUTH EVERY MORNING, Disp: 90 capsule, Rfl: 0  •  Xifaxan 550 MG tablet, Take 550 mg by mouth 3 (Three) Times a Day., Disp: , Rfl:   •  linaclotide (LINZESS) 72 MCG capsule capsule, Take 1 capsule by mouth Every Morning Before Breakfast., Disp: 90 capsule, Rfl: 0    Allergies:   Allergies   Allergen Reactions   • Sulfamethoxazole-Trimethoprim Itching, Swelling and Hives     Throat felt tight and arm swelled  Throat felt tight and arm swelled     • Penicillins Hives       Objective     Physical Exam:   Vital Signs:   Vitals:    09/02/22 0813   BP: 110/76   BP Location: Right arm   Patient Position: Sitting   Cuff Size: Adult   Pulse: 60   Resp: 16   Temp: 98 °F (36.7 °C)   TempSrc: Infrared   SpO2: 98%   Weight: 75.8 kg (167 lb 3.2 oz)   Height: 160 cm (63\")   PainSc: 0-No pain     Body mass index is 29.62 " kg/m². BMI is >= 25 and <30. (Overweight) The following options were offered after discussion;: weight loss educational material (shared in after visit summary)     Physical Exam  Constitutional:       General: She is not in acute distress.     Appearance: She is not ill-appearing.   HENT:      Head: Normocephalic.   Cardiovascular:      Rate and Rhythm: Normal rate and regular rhythm.      Heart sounds: Normal heart sounds. No murmur heard.  Pulmonary:      Breath sounds: Normal breath sounds.   Abdominal:      General: Bowel sounds are normal.      Tenderness: There is no abdominal tenderness.   Musculoskeletal:         General: No tenderness.   Lymphadenopathy:      Cervical: No cervical adenopathy.   Neurological:      General: No focal deficit present.      Mental Status: She is oriented to person, place, and time.   Psychiatric:         Mood and Affect: Mood normal.         Assessment / Plan      Assessment/Plan:   Diagnoses and all orders for this visit:    1. Hypothyroidism, unspecified type (Primary)  Continue current medications    2. Acute vaginitis  -     fluconazole (Diflucan) 150 MG tablet; Take 1 tablet by mouth 1 (One) Time for 1 dose.  Dispense: 1 tablet; Refill: 0  -     NuSwab VG+ - , Vagina; Future  -     NuSwab VG+ - Swab, Vagina    3. Gastroesophageal reflux disease without esophagitis  Continue current medications    4. Primary hypertension  Continue current medications      Follow Up:   Return in about 6 months (around 3/2/2023) for Medicare Wellness.    CECILY Aguila  [facility] [place] [facility]

## 2022-10-30 DIAGNOSIS — Z78.0 MENOPAUSE: ICD-10-CM

## 2022-10-31 ENCOUNTER — TELEPHONE (OUTPATIENT)
Dept: INTERNAL MEDICINE | Facility: CLINIC | Age: 67
End: 2022-10-31

## 2022-10-31 DIAGNOSIS — Z78.0 MENOPAUSE: ICD-10-CM

## 2022-10-31 RX ORDER — CONJUGATED ESTROGENS 0.62 MG/1
TABLET, FILM COATED ORAL
Qty: 90 TABLET | Refills: 1 | Status: SHIPPED | OUTPATIENT
Start: 2022-10-31 | End: 2022-10-31 | Stop reason: SDUPTHER

## 2022-10-31 NOTE — TELEPHONE ENCOUNTER
Caller: NithyaIliana    Relationship: Self    Best call back number:      [phone]     Requested Prescriptions:      estrogens, conjugated, (PREMARIN) 0.625 MG tablet    Pharmacy where request should be sent: [facility]/PHARMACY #[id number] - [address] [phone] [facility] [phone] FX     Additional details provided by patient:     PATIENT STATED SHE HAS BEEN OUT OF THE MEDICATION FOR (2) DAYS    PATIENT STATED PHARMACY SENT REFILL REQUEST ON 10/21/22    Does the patient have less than a 3 day supply:  [x] Yes  [] No    Chele Ott Rep   10/31/22 10:09 RACHAEL TIERNEY

## 2022-11-09 ENCOUNTER — HOSPITAL ENCOUNTER (OUTPATIENT)
Dept: GENERAL RADIOLOGY | Facility: HOSPITAL | Age: 67
Discharge: HOME | End: 2022-11-09
Admitting: NURSE PRACTITIONER

## 2022-11-09 ENCOUNTER — OFFICE VISIT (OUTPATIENT)
Dept: INTERNAL MEDICINE | Facility: CLINIC | Age: 67
End: 2022-11-09

## 2022-11-09 VITALS
OXYGEN SATURATION: 96 % | RESPIRATION RATE: 20 BRPM | DIASTOLIC BLOOD PRESSURE: 82 MMHG | BODY MASS INDEX: 29.76 KG/M2 | SYSTOLIC BLOOD PRESSURE: 128 MMHG | HEART RATE: 94 BPM | WEIGHT: 168 LBS | TEMPERATURE: 97.5 F

## 2022-11-09 DIAGNOSIS — R10.31 RIGHT LOWER QUADRANT PAIN: Primary | ICD-10-CM

## 2022-11-09 DIAGNOSIS — M25.551 BILATERAL HIP PAIN: ICD-10-CM

## 2022-11-09 DIAGNOSIS — M25.552 BILATERAL HIP PAIN: ICD-10-CM

## 2022-11-09 DIAGNOSIS — N30.01 ACUTE CYSTITIS WITH HEMATURIA: ICD-10-CM

## 2022-11-09 LAB
BILIRUB BLD-MCNC: NEGATIVE MG/DL
CLARITY, POC: CLEAR
COLOR UR: YELLOW
EXPIRATION DATE: ABNORMAL
GLUCOSE UR STRIP-MCNC: NEGATIVE MG/DL
KETONES UR QL: NEGATIVE
LEUKOCYTE EST, POC: NEGATIVE
Lab: ABNORMAL
NITRITE UR-MCNC: POSITIVE MG/ML
PH UR: 7 [PH] (ref 5–8)
PROT UR STRIP-MCNC: NEGATIVE MG/DL
RBC # UR STRIP: ABNORMAL /UL
SP GR UR: 1.01 (ref 1–1.03)
UROBILINOGEN UR QL: NORMAL

## 2022-11-09 PROCEDURE — 99214 OFFICE O/P EST MOD 30 MIN: CPT | Performed by: NURSE PRACTITIONER

## 2022-11-09 PROCEDURE — 81015 MICROSCOPIC EXAM OF URINE: CPT | Performed by: NURSE PRACTITIONER

## 2022-11-09 PROCEDURE — 73521 X-RAY EXAM HIPS BI 2 VIEWS: CPT

## 2022-11-09 PROCEDURE — 81003 URINALYSIS AUTO W/O SCOPE: CPT | Performed by: NURSE PRACTITIONER

## 2022-11-09 PROCEDURE — 87086 URINE CULTURE/COLONY COUNT: CPT | Performed by: NURSE PRACTITIONER

## 2022-11-09 PROCEDURE — 87186 SC STD MICRODIL/AGAR DIL: CPT | Performed by: NURSE PRACTITIONER

## 2022-11-09 RX ORDER — NITROFURANTOIN 25; 75 MG/1; MG/1
100 CAPSULE ORAL 2 TIMES DAILY
Qty: 14 CAPSULE | Refills: 0 | Status: SHIPPED | OUTPATIENT
Start: 2022-11-09 | End: 2022-12-19

## 2022-11-09 NOTE — PROGRESS NOTES
"Patient Name: Iliana Barriga  : 1955   MRN: [id number]     Chief Complaint:    Chief Complaint   Patient presents with   • Abdominal Pain     Right lower quadrant pain       History of Present Illness: Iliana Barriga is a 67 y.o. female presents to clinic for abdominal pain. Location is RLQ. Onset was gradual. It was first a twinge then lingers. Quality was sharp. Pain worsening with walking/sitting. Pain level \"10 \" for 48 hours. Associated symptoms are  nausea (tried to vomit but couldn't) and flank pain. She has a history of kidney stones.  No fever or dysuria. She tried linzess the first day; repeated x 1. That didn't help. The symptoms have improved some.     She has had bilateral hip pain right greater than left radiates to groin. It worsens with walking. She fell years ago and landed on her hip. Her father had bone cancer.         Review of System: Review of Systems   Constitutional: Negative for fatigue and fever.   HENT: Negative for congestion and rhinorrhea.    Respiratory: Negative for wheezing.    Cardiovascular: Negative for chest pain and palpitations.   Gastrointestinal: Negative for abdominal pain, diarrhea, nausea and vomiting.   Genitourinary: Negative for dysuria.   Musculoskeletal: Negative for myalgias.   Skin: Negative for rash.   Neurological: Negative for headaches.   Hematological: Negative for adenopathy.   Psychiatric/Behavioral: Negative for dysphoric mood.        Medications:     Current Outpatient Medications:   •  albuterol sulfate  (90 Base) MCG/ACT inhaler, Inhale 2 puffs Every 6 (Six) Hours As Needed for Wheezing or Shortness of Air (cough)., Disp: 18 g, Rfl: 3  •  Atrovent HFA 17 MCG/ACT inhaler, Inhale 1 puff 2 (Two) Times a Day., Disp: 1 each, Rfl: 3  •  azelastine (ASTELIN) 0.1 % nasal spray, 2 sprays into the nostril(s) as directed by provider., Disp: , Rfl:   •  CVS D3 1000 units capsule, Take  by mouth Daily., Disp: , Rfl: 1  •  cyclobenzaprine " (FLEXERIL) 5 MG tablet, Take 1 tablet by mouth Every 8 (Eight) Hours., Disp: , Rfl:   •  estrogens, conjugated, (Premarin) 0.625 MG tablet, Take 1 tablet by mouth Daily. Take daily for 21 days then do not take for 7 days., Disp: 90 tablet, Rfl: 1  •  levothyroxine (Synthroid) 25 MCG tablet, Take 1 tablet by mouth Daily. Has to be on Brand name only. She CAN NOT use generic. Ok to give new script, Disp: 90 tablet, Rfl: 1  •  linaclotide (LINZESS) 72 MCG capsule capsule, Take 1 capsule by mouth Every Morning Before Breakfast., Disp: 90 capsule, Rfl: 0  •  Magnesium Oxide 500 MG tablet,  0 Refill(s), Disp: , Rfl:   •  Multiple Vitamins-Minerals (MULTIVITAMIN ADULT EXTRA C PO), Take  by mouth., Disp: , Rfl:   •  omeprazole (priLOSEC) 20 MG capsule, Take 1 capsule by mouth Daily., Disp: 90 capsule, Rfl: 0  •  tretinoin (Retin-A) 0.025 % cream, Apply 1 application topically to the appropriate area as directed Every Night., Disp: 1 each, Rfl: 3  •  triamterene-hydrochlorothiazide (DYAZIDE) 37.5-25 MG per capsule, TAKE 1 CAPSULE BY MOUTH EVERY MORNING, Disp: 90 capsule, Rfl: 0  •  Xifaxan 550 MG tablet, Take 550 mg by mouth 3 (Three) Times a Day., Disp: , Rfl:   •  fluticasone (Flonase Sensimist) 27.5 MCG/SPRAY nasal spray, 1 spray into the nostril(s) as directed by provider Daily for 30 days., Disp: 9.1 mL, Rfl: 3  •  nitrofurantoin, macrocrystal-monohydrate, (Macrobid) 100 MG capsule, Take 1 capsule by mouth 2 (Two) Times a Day., Disp: 14 capsule, Rfl: 0    Allergies:   Allergies   Allergen Reactions   • Sulfamethoxazole-Trimethoprim Itching, Swelling and Hives     Throat felt tight and arm swelled  Throat felt tight and arm swelled     • Penicillins Hives       Objective     Physical Exam:   Vital Signs:   Vitals:    11/09/22 1250   BP: 128/82   BP Location: Left arm   Patient Position: Sitting   Cuff Size: Adult   Pulse: 94   Resp: 20   Temp: 97.5 °F (36.4 °C)   TempSrc: Temporal   SpO2: 96%   Weight: 76.2 kg (168 lb)    PainSc: 2  Comment: Feels okay today but spikes up to a 8-9   PainLoc: Abdomen  Comment: Right lower quadrant           Physical Exam  Constitutional:       General: She is not in acute distress.     Appearance: She is not ill-appearing.   HENT:      Head: Normocephalic.   Cardiovascular:      Rate and Rhythm: Normal rate and regular rhythm.      Heart sounds: Normal heart sounds. No murmur heard.  Pulmonary:      Breath sounds: Normal breath sounds. No wheezing, rhonchi or rales.   Abdominal:      General: Bowel sounds are normal.      Tenderness: There is no abdominal tenderness. There is no right CVA tenderness, left CVA tenderness, guarding or rebound.   Neurological:      General: No focal deficit present.      Mental Status: She is oriented to person, place, and time.   Psychiatric:         Mood and Affect: Mood normal.         Assessment / Plan      Assessment/Plan:   Diagnoses and all orders for this visit:    1. Right lower quadrant pain (Primary)  -     CT Abdomen Pelvis With & Without Contrast; Future  If pain worsens she was instructed to go to ER.     2. Acute cystitis with hematuria  -     POC Urinalysis Dipstick, Automated  -     Urine Culture - Urine, Urine, Clean Catch; Future  -     Urinalysis, Microscopic Only - Urine, Clean Catch; Future  -     nitrofurantoin, macrocrystal-monohydrate, (Macrobid) 100 MG capsule; Take 1 capsule by mouth 2 (Two) Times a Day.  Dispense: 14 capsule; Refill: 0  -     Urinalysis, Microscopic Only - Urine, Clean Catch  -     Urine Culture - Urine, Urine, Clean Catch    3. Bilateral hip pain  -     XR Hips Bilateral With or Without Pelvis 2 View; Future    Follow Up: I will discuss follow-up pending testing. Otherwise f/u 12/19/22    CECILY Aguila  [facility]

## 2022-11-10 LAB
BACTERIA UR QL AUTO: ABNORMAL /HPF
HYALINE CASTS UR QL AUTO: ABNORMAL /LPF
RBC # UR STRIP: ABNORMAL /HPF
REF LAB TEST METHOD: ABNORMAL
SQUAMOUS #/AREA URNS HPF: ABNORMAL /HPF
WBC # UR STRIP: ABNORMAL /HPF

## 2022-11-11 ENCOUNTER — TELEPHONE (OUTPATIENT)
Dept: INTERNAL MEDICINE | Facility: CLINIC | Age: 67
End: 2022-11-11

## 2022-11-11 LAB — BACTERIA SPEC AEROBE CULT: ABNORMAL

## 2022-11-11 RX ORDER — CYCLOBENZAPRINE HCL 5 MG
5 TABLET ORAL EVERY 8 HOURS
Qty: 30 TABLET | Refills: 0 | Status: SHIPPED | OUTPATIENT
Start: 2022-11-11

## 2022-11-15 ENCOUNTER — TELEPHONE (OUTPATIENT)
Dept: INTERNAL MEDICINE | Facility: CLINIC | Age: 67
End: 2022-11-15

## 2022-11-15 DIAGNOSIS — H10.30 ACUTE CONJUNCTIVITIS, UNSPECIFIED ACUTE CONJUNCTIVITIS TYPE, UNSPECIFIED LATERALITY: Primary | ICD-10-CM

## 2022-11-15 RX ORDER — OFLOXACIN 3 MG/ML
1 SOLUTION/ DROPS OPHTHALMIC 4 TIMES DAILY
Qty: 5 ML | Refills: 0 | Status: SHIPPED | OUTPATIENT
Start: 2022-11-15 | End: 2022-12-19

## 2022-11-15 NOTE — TELEPHONE ENCOUNTER
Caller: Iliana Barriga    Relationship: Self    Best call back number: [phone]    What are your current symptoms:   CONGESTIONIVSTIS IN RIGHT EYE   BURNING, RED, SWOLLEN, ITCHING, MATTED AND DRAINING     How long have you been experiencing symptoms:  3 DAYS     Have you had these symptoms before:    [x] Yes  [] No    Have you been treated for these symptoms before:   [x] Yes  [] No    If a prescription is needed, what is your preferred pharmacy and phone number:      [facility]/pharmacy #[id number] - [address] [phone] [facility] [phone] FX  [phone]    Additional notes:  PATIENT WOULD LIKE FOR MEDICATION TO BE CALLED INTO THE PHARMACY TO HELP CLEAR UP CONGESTIONIVSTIS IN THE RIGHT EYE

## 2022-11-15 NOTE — TELEPHONE ENCOUNTER
I sent in a prescription for ofloxacin eyedrops.  Have her to remove contacts until the infection clears.  Any worsening patient to come into the clinic or see ophthalmologist.

## 2022-12-01 ENCOUNTER — HOSPITAL ENCOUNTER (OUTPATIENT)
Dept: CT IMAGING | Facility: HOSPITAL | Age: 67
Discharge: HOME | End: 2022-12-01
Admitting: NURSE PRACTITIONER

## 2022-12-01 DIAGNOSIS — R10.31 RIGHT LOWER QUADRANT PAIN: ICD-10-CM

## 2022-12-01 LAB — CREAT BLDA-MCNC: 0.6 MG/DL (ref 0.6–1.3)

## 2022-12-01 PROCEDURE — 25010000002 IOPAMIDOL 61 % SOLUTION: Performed by: NURSE PRACTITIONER

## 2022-12-01 PROCEDURE — 82565 ASSAY OF CREATININE: CPT

## 2022-12-01 PROCEDURE — 74178 CT ABD&PLV WO CNTR FLWD CNTR: CPT

## 2022-12-01 RX ADMIN — IOPAMIDOL 100 ML: 612 INJECTION, SOLUTION INTRAVENOUS at 14:01

## 2022-12-02 ENCOUNTER — TELEPHONE (OUTPATIENT)
Dept: INTERNAL MEDICINE | Facility: CLINIC | Age: 67
End: 2022-12-02

## 2022-12-02 NOTE — TELEPHONE ENCOUNTER
----- Message from CECILY Aguila sent at 12/2/2022 12:44 PM EST -----  CT is normal except uncomplicated diverticulosis. Follow-up if no improvement in symptoms.

## 2022-12-16 DIAGNOSIS — H81.02 MENIERE'S DISEASE OF LEFT EAR: ICD-10-CM

## 2022-12-16 RX ORDER — TRIAMTERENE AND HYDROCHLOROTHIAZIDE 37.5; 25 MG/1; MG/1
CAPSULE ORAL
Qty: 90 CAPSULE | Refills: 1 | Status: SHIPPED | OUTPATIENT
Start: 2022-12-16

## 2022-12-19 ENCOUNTER — OFFICE VISIT (OUTPATIENT)
Dept: INTERNAL MEDICINE | Facility: CLINIC | Age: 67
End: 2022-12-19

## 2022-12-19 ENCOUNTER — LAB (OUTPATIENT)
Dept: LAB | Facility: HOSPITAL | Age: 67
End: 2022-12-19

## 2022-12-19 VITALS
OXYGEN SATURATION: 97 % | DIASTOLIC BLOOD PRESSURE: 82 MMHG | WEIGHT: 168.4 LBS | SYSTOLIC BLOOD PRESSURE: 120 MMHG | RESPIRATION RATE: 22 BRPM | HEART RATE: 107 BPM | TEMPERATURE: 97.1 F | BODY MASS INDEX: 29.84 KG/M2 | HEIGHT: 63 IN

## 2022-12-19 DIAGNOSIS — R53.83 OTHER FATIGUE: ICD-10-CM

## 2022-12-19 DIAGNOSIS — Z86.16 HISTORY OF COVID-19: ICD-10-CM

## 2022-12-19 DIAGNOSIS — R06.02 SHORTNESS OF BREATH: ICD-10-CM

## 2022-12-19 DIAGNOSIS — R06.02 SHORTNESS OF BREATH: Primary | ICD-10-CM

## 2022-12-19 LAB — D DIMER PPP FEU-MCNC: 0.28 MCGFEU/ML (ref 0–0.66)

## 2022-12-19 PROCEDURE — 85025 COMPLETE CBC W/AUTO DIFF WBC: CPT

## 2022-12-19 PROCEDURE — 85379 FIBRIN DEGRADATION QUANT: CPT

## 2022-12-19 PROCEDURE — 99214 OFFICE O/P EST MOD 30 MIN: CPT | Performed by: NURSE PRACTITIONER

## 2022-12-19 PROCEDURE — 80053 COMPREHEN METABOLIC PANEL: CPT

## 2022-12-19 NOTE — PROGRESS NOTES
Patient Name: Iliana Barriga  : 1955   MRN: [id number]     Chief Complaint:    Chief Complaint   Patient presents with   • Shortness of Breath     Still after being sick       History of Present Illness: Iliana Barriga is a 67 y.o. female presents to clinic today for shortness of breath,and fatigue.   CT Abdomen Pelvis With & Without Contrast (2022 14:23)  IMPRESSION:  Impression:     1. No acute findings within the abdomen or pelvis. No CT explanation for the patient's right lower quadrant pain.  2. Uncomplicated colonic diverticulosis.  3. Hysterectomy.   Her abdominal pain has improved.     The patient reports experiencing consent shortness of breath, and fatigue post COVID-19 with activities. She reports experiencing a frightening symptom of coughing up blood. She reports she will experience wheezing at night when she is sleeping. She describes experiencing symptoms after attending a birthday party at her granddaughter's . She adds one of the mothers from the party was positive for COVID-19. She states she did not test for COVID-19. However, she was symptomatic.      She notes her O2 stat reading dropped to 90 percent. She states she utilized Mucinex, her inhaler, and a nasal spray. She adds she was utilizing a pure mask on her face throughout the day while monitoring her O2 stat readings. She reports improvement in her O2 stat readings in 7 or 8 days.       She denies chest pain, and palpitations. She denies pain or pressure in her sinuses. However, she reports dryness in her nose. She reports she utilizes hydrochlorothiazide/Triamterene with relief.         Subjective     Review of System: Review of Systems   Constitutional: Positive for fatigue. Negative for diaphoresis and fever.   HENT: Negative for congestion, ear pain, mouth sores, postnasal drip, sinus pressure, sinus pain, sneezing and sore throat.         Nose dryness    Eyes: Negative for visual disturbance.    Respiratory: Positive for shortness of breath and wheezing. Negative for cough and chest tightness.    Cardiovascular: Negative for chest pain.   Gastrointestinal: Negative for abdominal pain, diarrhea, nausea and vomiting.   Musculoskeletal: Negative for arthralgias and myalgias.   Skin: Negative for color change.   Neurological: Negative for dizziness, weakness and headaches.   Hematological: Negative for adenopathy.   Psychiatric/Behavioral: Negative for dysphoric mood. The patient is not nervous/anxious.         Medications:     Current Outpatient Medications:   •  albuterol sulfate  (90 Base) MCG/ACT inhaler, Inhale 2 puffs Every 6 (Six) Hours As Needed for Wheezing or Shortness of Air (cough)., Disp: 18 g, Rfl: 3  •  Atrovent HFA 17 MCG/ACT inhaler, Inhale 1 puff 2 (Two) Times a Day., Disp: 1 each, Rfl: 3  •  azelastine (ASTELIN) 0.1 % nasal spray, 2 sprays into the nostril(s) as directed by provider., Disp: , Rfl:   •  [employer] D3 1000 units capsule, Take  by mouth Daily., Disp: , Rfl: 1  •  cyclobenzaprine (FLEXERIL) 5 MG tablet, Take 1 tablet by mouth Every 8 (Eight) Hours., Disp: 30 tablet, Rfl: 0  •  estrogens, conjugated, (Premarin) 0.625 MG tablet, Take 1 tablet by mouth Daily. Take daily for 21 days then do not take for 7 days., Disp: 90 tablet, Rfl: 1  •  fluticasone (Flonase Sensimist) 27.5 MCG/SPRAY nasal spray, 1 spray into the nostril(s) as directed by provider Daily for 30 days., Disp: 9.1 mL, Rfl: 3  •  levothyroxine (Synthroid) 25 MCG tablet, Take 1 tablet by mouth Daily. Has to be on Brand name only. She CAN NOT use generic. Ok to give new script, Disp: 90 tablet, Rfl: 1  •  linaclotide (LINZESS) 72 MCG capsule capsule, Take 1 capsule by mouth Every Morning Before Breakfast., Disp: 90 capsule, Rfl: 0  •  Magnesium Oxide 500 MG tablet,  0 Refill(s), Disp: , Rfl:   •  Multiple Vitamins-Minerals (MULTIVITAMIN ADULT EXTRA C PO), Take  by mouth., Disp: , Rfl:   •  omeprazole (priLOSEC) 20 MG  "capsule, Take 1 capsule by mouth Daily., Disp: 90 capsule, Rfl: 0  •  tretinoin (Retin-A) 0.025 % cream, Apply 1 application topically to the appropriate area as directed Every Night., Disp: 45 g, Rfl: 3  •  triamterene-hydrochlorothiazide (DYAZIDE) 37.5-25 MG per capsule, TAKE 1 CAPSULE BY MOUTH EVERY DAY IN THE MORNING, Disp: 90 capsule, Rfl: 1  •  Xifaxan 550 MG tablet, Take 550 mg by mouth 3 (Three) Times a Day., Disp: , Rfl:     Allergies:   Allergies   Allergen Reactions   • Sulfamethoxazole-Trimethoprim Itching, Swelling and Hives     Throat felt tight and arm swelled  Throat felt tight and arm swelled     • Penicillins Hives       Objective     Physical Exam:   Vital Signs:   Vitals:    12/19/22 1506   BP: 120/82   BP Location: Right arm   Patient Position: Sitting   Cuff Size: Adult   Pulse: 107   Resp: 22   Temp: 97.1 °F (36.2 °C)   TempSrc: Infrared   SpO2: 97%   Weight: 76.4 kg (168 lb 6.4 oz)   Height: 160 cm (63\")   PainSc: 0-No pain     Body mass index is 29.83 kg/m². BMI is >= 25 and <30. (Overweight) The following options were offered after discussion;: weight loss educational material (shared in after visit summary)      Physical Exam  Constitutional:       General: She is not in acute distress.     Appearance: She is not ill-appearing.   HENT:      Head: Normocephalic.   Cardiovascular:      Rate and Rhythm: Normal rate and regular rhythm.      Heart sounds: Normal heart sounds. No murmur heard.  Pulmonary:      Effort: Pulmonary effort is normal.      Breath sounds: Normal breath sounds. No wheezing.   Abdominal:      General: Bowel sounds are normal.      Tenderness: There is no abdominal tenderness.   Neurological:      General: No focal deficit present.      Mental Status: She is oriented to person, place, and time.   Psychiatric:         Mood and Affect: Mood normal.                 ECG 12 Lead    Date/Time: 12/20/2022 9:44 AM  Performed by: Katheryn Combs APRN  Authorized by: Garret " CECILY Campa   Comparison: compared with previous ECG from 8/1/2020  Similar to previous ECG  Rhythm: sinus rhythm  Rate: normal  Conduction: conduction normal  QRS axis: normal    Clinical impression: normal ECG            Assessment / Plan      Assessment/Plan:   Diagnoses and all orders for this visit:    1. Shortness of breath (Primary)  -     CT Chest With Contrast; Future  -     D-dimer, Quantitative; Future    2. History of COVID-19  -     CT Chest With Contrast; Future  -     D-dimer, Quantitative; Future    3. Other fatigue  -     ECG 12 Lead  -     Comprehensive Metabolic Panel; Future  -     CBC & Differential; Future         1.Shortness of breath- The patient will obtain an EKG in the office today. A referral for a CT scan has been submitted pending approval regarding fatigue, and shortness of breath post COVID-19.     2.Fatigue- She will obtain laboratory studies today in the office. Including CBC, CMP, D-dimer, and liver and kidney function. Informed the patient depending on the results of the laboratory studies and CT scan further studies will be completed, including an echocardiogram.       Follow Up:   F/u pending tests.   CECILY Aguila  [facility]    Transcribed from ambient dictation for CECILY Aguila by Hong Rodgers.  12/19/22   17:04 EST    Patient or patient representative verbalized consent to the visit recording.  I have personally performed the services described in this document as transcribed by the above individual, and it is both accurate and complete.

## 2022-12-20 ENCOUNTER — TELEPHONE (OUTPATIENT)
Dept: INTERNAL MEDICINE | Facility: CLINIC | Age: 67
End: 2022-12-20

## 2022-12-20 LAB
ALBUMIN SERPL-MCNC: 4.6 G/DL (ref 3.5–5.2)
ALBUMIN/GLOB SERPL: 1.6 G/DL
ALP SERPL-CCNC: 67 U/L (ref 39–117)
ALT SERPL W P-5'-P-CCNC: 13 U/L (ref 1–33)
ANION GAP SERPL CALCULATED.3IONS-SCNC: 12 MMOL/L (ref 5–15)
AST SERPL-CCNC: 16 U/L (ref 1–32)
BASOPHILS # BLD AUTO: 0.05 10*3/MM3 (ref 0–0.2)
BASOPHILS NFR BLD AUTO: 0.5 % (ref 0–1.5)
BILIRUB SERPL-MCNC: 0.2 MG/DL (ref 0–1.2)
BUN SERPL-MCNC: 21 MG/DL (ref 8–23)
BUN/CREAT SERPL: 26.3 (ref 7–25)
CALCIUM SPEC-SCNC: 10.2 MG/DL (ref 8.6–10.5)
CHLORIDE SERPL-SCNC: 103 MMOL/L (ref 98–107)
CO2 SERPL-SCNC: 25 MMOL/L (ref 22–29)
CREAT SERPL-MCNC: 0.8 MG/DL (ref 0.57–1)
DEPRECATED RDW RBC AUTO: 40.9 FL (ref 37–54)
EGFRCR SERPLBLD CKD-EPI 2021: 81.4 ML/MIN/1.73
EOSINOPHIL # BLD AUTO: 0.1 10*3/MM3 (ref 0–0.4)
EOSINOPHIL NFR BLD AUTO: 1.1 % (ref 0.3–6.2)
ERYTHROCYTE [DISTWIDTH] IN BLOOD BY AUTOMATED COUNT: 12.3 % (ref 12.3–15.4)
GLOBULIN UR ELPH-MCNC: 2.8 GM/DL
GLUCOSE SERPL-MCNC: 93 MG/DL (ref 65–99)
HCT VFR BLD AUTO: 43.5 % (ref 34–46.6)
HGB BLD-MCNC: 14.9 G/DL (ref 12–15.9)
IMM GRANULOCYTES # BLD AUTO: 0.02 10*3/MM3 (ref 0–0.05)
IMM GRANULOCYTES NFR BLD AUTO: 0.2 % (ref 0–0.5)
LYMPHOCYTES # BLD AUTO: 3.97 10*3/MM3 (ref 0.7–3.1)
LYMPHOCYTES NFR BLD AUTO: 42.5 % (ref 19.6–45.3)
MCH RBC QN AUTO: 31.2 PG (ref 26.6–33)
MCHC RBC AUTO-ENTMCNC: 34.3 G/DL (ref 31.5–35.7)
MCV RBC AUTO: 91.2 FL (ref 79–97)
MONOCYTES # BLD AUTO: 1.15 10*3/MM3 (ref 0.1–0.9)
MONOCYTES NFR BLD AUTO: 12.3 % (ref 5–12)
NEUTROPHILS NFR BLD AUTO: 4.06 10*3/MM3 (ref 1.7–7)
NEUTROPHILS NFR BLD AUTO: 43.4 % (ref 42.7–76)
NRBC BLD AUTO-RTO: 0 /100 WBC (ref 0–0.2)
PLATELET # BLD AUTO: 228 10*3/MM3 (ref 140–450)
PMV BLD AUTO: 13 FL (ref 6–12)
POTASSIUM SERPL-SCNC: 4.1 MMOL/L (ref 3.5–5.2)
PROT SERPL-MCNC: 7.4 G/DL (ref 6–8.5)
RBC # BLD AUTO: 4.77 10*6/MM3 (ref 3.77–5.28)
SODIUM SERPL-SCNC: 140 MMOL/L (ref 136–145)
WBC NRBC COR # BLD: 9.35 10*3/MM3 (ref 3.4–10.8)

## 2022-12-20 PROCEDURE — 93000 ELECTROCARDIOGRAM COMPLETE: CPT | Performed by: NURSE PRACTITIONER

## 2022-12-20 NOTE — TELEPHONE ENCOUNTER
Left voice mail message for Pt to call office for message from PCP. Office number given.   Hub please relay message  ----- Message from CECILY Aguila sent at 12/20/2022  8:48 AM EST -----  D-dimer was negative. The other test results show that your labs are in the normal range or mild abnormalities which are not concerning. We will continue to monitor labs periodically. Continue plan of care discussed at your appointment and follow-up if worsening or new concerns.   .

## 2022-12-26 DIAGNOSIS — E03.9 HYPOTHYROIDISM, UNSPECIFIED TYPE: ICD-10-CM

## 2022-12-26 DIAGNOSIS — K21.9 GASTROESOPHAGEAL REFLUX DISEASE WITHOUT ESOPHAGITIS: ICD-10-CM

## 2022-12-28 RX ORDER — LEVOTHYROXINE SODIUM 25 MCG
TABLET ORAL
Qty: 90 TABLET | Refills: 1 | Status: SHIPPED | OUTPATIENT
Start: 2022-12-28

## 2022-12-28 RX ORDER — OMEPRAZOLE 20 MG/1
CAPSULE, DELAYED RELEASE ORAL
Qty: 90 CAPSULE | Refills: 0 | Status: SHIPPED | OUTPATIENT
Start: 2022-12-28

## 2023-01-06 ENCOUNTER — HOSPITAL ENCOUNTER (OUTPATIENT)
Dept: CT IMAGING | Facility: HOSPITAL | Age: 68
Discharge: HOME | End: 2023-01-06
Admitting: NURSE PRACTITIONER
Payer: MEDICARE

## 2023-01-06 ENCOUNTER — TELEPHONE (OUTPATIENT)
Dept: INTERNAL MEDICINE | Facility: CLINIC | Age: 68
End: 2023-01-06
Payer: MEDICARE

## 2023-01-06 DIAGNOSIS — Z86.16 HISTORY OF COVID-19: ICD-10-CM

## 2023-01-06 DIAGNOSIS — I25.10 CORONARY ARTERY DISEASE INVOLVING NATIVE HEART WITHOUT ANGINA PECTORIS, UNSPECIFIED VESSEL OR LESION TYPE: ICD-10-CM

## 2023-01-06 DIAGNOSIS — R06.02 SHORTNESS OF BREATH: ICD-10-CM

## 2023-01-06 DIAGNOSIS — J98.8 RESPIRATORY INFECTION: Primary | ICD-10-CM

## 2023-01-06 DIAGNOSIS — R00.2 PALPITATIONS: ICD-10-CM

## 2023-01-06 PROCEDURE — 25010000002 IOPAMIDOL 61 % SOLUTION: Performed by: NURSE PRACTITIONER

## 2023-01-06 PROCEDURE — 71260 CT THORAX DX C+: CPT

## 2023-01-06 RX ORDER — AZITHROMYCIN 500 MG/1
500 TABLET, FILM COATED ORAL DAILY
Qty: 5 TABLET | Refills: 0 | Status: SHIPPED | OUTPATIENT
Start: 2023-01-06 | End: 2023-01-09 | Stop reason: SDUPTHER

## 2023-01-06 RX ORDER — ASPIRIN 81 MG/1
81 TABLET ORAL DAILY
Qty: 90 TABLET | Refills: 0 | Status: SHIPPED | OUTPATIENT
Start: 2023-01-06

## 2023-01-06 RX ORDER — ROSUVASTATIN CALCIUM 5 MG/1
5 TABLET, COATED ORAL DAILY
Qty: 90 TABLET | Refills: 0 | Status: SHIPPED | OUTPATIENT
Start: 2023-01-06 | End: 2023-04-04

## 2023-01-06 RX ADMIN — IOPAMIDOL 90 ML: 612 INJECTION, SOLUTION INTRAVENOUS at 09:38

## 2023-01-06 NOTE — TELEPHONE ENCOUNTER
I discussed the results of CT of chest and discussed plan of care. Patient verbalized an understanding.

## 2023-01-09 DIAGNOSIS — J98.8 RESPIRATORY INFECTION: ICD-10-CM

## 2023-01-09 RX ORDER — AZITHROMYCIN 500 MG/1
500 TABLET, FILM COATED ORAL DAILY
Qty: 5 TABLET | Refills: 0 | Status: SHIPPED | OUTPATIENT
Start: 2023-01-09 | End: 2023-01-27

## 2023-01-27 ENCOUNTER — OFFICE VISIT (OUTPATIENT)
Dept: CARDIOLOGY | Facility: HOSPITAL | Age: 68
End: 2023-01-27
Payer: MEDICARE

## 2023-01-27 ENCOUNTER — LAB (OUTPATIENT)
Dept: LAB | Facility: HOSPITAL | Age: 68
End: 2023-01-27
Payer: MEDICARE

## 2023-01-27 VITALS
OXYGEN SATURATION: 96 % | HEART RATE: 78 BPM | DIASTOLIC BLOOD PRESSURE: 69 MMHG | SYSTOLIC BLOOD PRESSURE: 125 MMHG | BODY MASS INDEX: 30.74 KG/M2 | RESPIRATION RATE: 20 BRPM | TEMPERATURE: 98.6 F | HEIGHT: 63 IN | WEIGHT: 173.5 LBS

## 2023-01-27 DIAGNOSIS — Z82.49 FAMILY HISTORY OF PREMATURE CAD: ICD-10-CM

## 2023-01-27 DIAGNOSIS — R00.2 PALPITATIONS: ICD-10-CM

## 2023-01-27 DIAGNOSIS — E83.42 HYPOMAGNESEMIA: ICD-10-CM

## 2023-01-27 DIAGNOSIS — R07.9 CHEST PAIN, UNSPECIFIED TYPE: ICD-10-CM

## 2023-01-27 DIAGNOSIS — I25.119 CORONARY ARTERY DISEASE INVOLVING NATIVE CORONARY ARTERY OF NATIVE HEART WITH ANGINA PECTORIS: ICD-10-CM

## 2023-01-27 DIAGNOSIS — R06.09 DYSPNEA ON EXERTION: ICD-10-CM

## 2023-01-27 DIAGNOSIS — R68.89 DECREASED ACTIVITY TOLERANCE: ICD-10-CM

## 2023-01-27 LAB — MAGNESIUM SERPL-MCNC: 1.9 MG/DL (ref 1.6–2.4)

## 2023-01-27 PROCEDURE — 99214 OFFICE O/P EST MOD 30 MIN: CPT | Performed by: NURSE PRACTITIONER

## 2023-01-27 PROCEDURE — 83735 ASSAY OF MAGNESIUM: CPT

## 2023-01-27 PROCEDURE — 36415 COLL VENOUS BLD VENIPUNCTURE: CPT

## 2023-01-27 RX ORDER — FLUTICASONE PROPIONATE 50 MCG
2 SPRAY, SUSPENSION (ML) NASAL DAILY
COMMUNITY

## 2023-01-27 NOTE — PROGRESS NOTES
"[facility], [facility]    Chief Complaint  Establish Care (Dyspnea, CP with (CAD), family hx of premature CAD, palpitations. )    Subjective    History of Present Illness {  Problem List  Visit  Diagnosis   Encounters  Notes  Medications  Labs  Result Review Imaging  Media :23}     Iliana Barriga presents to [facility] CARDIOLOG[facility] for   History of Present Illness     67-year-old female with history of hyperlipidemia, GERD, hearing loss, hypothyroidism, Ménière's disease, CAD (minimal coronary artery calcifications noted on CT chest).    Patient with complaints of shortness of breath and fatigue.  Continued symptoms after having COVID (Oct 2022).  Wheezing at night.    Fatigue as improved.  She was also treated for PNA.  Continued dyspnea on exertion.  Decreased activity tolerance.  Reports CP or pressure (worse with stress, not with exertion or climbing steps). Dizziness and feelings of lightheadedness.     Worsening indigestion that radiated to jaw.  Took antacid. Improved.     Palpitations, \"quivery feelings\".  Increased fear and will lye down and use inhaler.   HR .  Worse with stress.  < 5mins.  Improved with relaxation.     Family hx of brothers with premature CAD age 40's.      Objective     Vital Signs:   Vitals:    01/27/23 0948 01/27/23 0951 01/27/23 0953   BP: 139/75 148/79 125/69   BP Location: Right arm Left arm Left arm   Patient Position: Sitting Standing Sitting   Cuff Size: Adult Adult Adult   Pulse: 83 84 78   Resp:   20   Temp:   98.6 °F (37 °C)   TempSrc:   Temporal   SpO2: 96% 97% 96%   Weight:   78.7 kg (173 lb 8 oz)   Height:   160 cm (63\")     Body mass index is 30.73 kg/m².  Physical Exam  Vitals reviewed.   Constitutional:       General: She is not in acute distress.     Appearance: Normal appearance.   Cardiovascular:      Rate and Rhythm: Normal rate and regular rhythm.      Pulses:           " Posterior tibial pulses are 2+ on the right side and 2+ on the left side.      Heart sounds: Normal heart sounds.   Pulmonary:      Effort: Pulmonary effort is normal.      Breath sounds: Normal breath sounds.   Musculoskeletal:      Right lower leg: No edema.      Left lower leg: No edema.   Skin:     General: Skin is warm and dry.   Neurological:      Mental Status: She is alert.   Psychiatric:         Mood and Affect: Mood normal.         Behavior: Behavior is cooperative.              Result Review  Data Reviewed:{ Labs  Result Review  Imaging  Med Tab  Media :23}   EKG 12/19/2022: Sinus rhythm 94 bpm    Lab Results   Component Value Date    WBC 9.35 12/19/2022    HGB 14.9 12/19/2022    HCT 43.5 12/19/2022    MCV 91.2 12/19/2022     12/19/2022     Lab Results   Component Value Date    GLUCOSE 93 12/19/2022    CALCIUM 10.2 12/19/2022     12/19/2022    K 4.1 12/19/2022    CO2 25.0 12/19/2022     12/19/2022    BUN 21 12/19/2022    CREATININE 0.80 12/19/2022    EGFRIFAFRI >60 07/13/2021    EGFRIFNONA >60 07/13/2021    BCR 26.3 (H) 12/19/2022    ANIONGAP 12.0 12/19/2022     Lab Results   Component Value Date    TSH 2.340 07/01/2022     Lab Results   Component Value Date    CHOL 182 07/01/2022    CHLPL 191 07/13/2021     Lab Results   Component Value Date    TRIG 296 (H) 07/01/2022    TRIG 214 (H) 07/13/2021     Lab Results   Component Value Date    HDL 56 07/01/2022    HDL 55 07/13/2021     Lab Results   Component Value Date    LDL 78 07/01/2022    LDL 93.2 07/13/2021     Lab Results   Component Value Date    TSH 2.340 07/01/2022     Lab Results   Component Value Date    HGBA1C 5.8 (H) 07/13/2021     CT scan of chest 1/6/2023: Mild coronary artery calcifications, no pleural effusion, minimal linear atelectasis or scarring within the medial aspect of the right upper and right middle lobe no suspicious nodules.              Assessment and Plan {CC Problem List  Visit Diagnosis  ROS  Review  (Popup)  Health Maintenance  Quality  BestPractice  Medications  SmartSets  SnapShot Encounters  Media :23}   1. Chest pain, unspecified type    - Stress Test With Myocardial Perfusion One Day; Pt with severe decreased activity tolerance and dyspnea with minimal activity.  Lexiscan to be completed.  Would not be able to reach target heart rate.      2. Coronary artery disease involving native coronary artery of native heart with angina pectoris (HCC)  CAD noted on recent CT of chest  Continue asa, statin    - Stress Test With Myocardial Perfusion One Day; Future    3. Dyspnea on exertion  Hx of COVID and PNA    - Stress Test With Myocardial Perfusion One Day; Future  - Adult Transthoracic Echo Complete W/ Cont if Necessary Per Protocol; Future    4. Palpitations  Discussed holter.  Will monitor at this time if continue, worsens, or will move noted on stress test will consider Holter.    - Adult Transthoracic Echo Complete W/ Cont if Necessary Per Protocol; Future  - Magnesium; Future    5. Family history of premature CAD    - Stress Test With Myocardial Perfusion One Day; Future    6. Decreased activity tolerance    - Stress Test With Myocardial Perfusion One Day; Future    7. Hypomagnesemia  Hx of.  Will need labs.   - Magnesium; Future          Follow Up {Instructions Charge Capture  Follow-up Communications :23}   Return in about 6 weeks (around 3/10/2023) for Office visit, Chest pain.    Patient was given instructions and counseling regarding her condition or for health maintenance advice. Please see specific information pulled into the AVS if appropriate.  Patient was instructed to call the [facility] with any questions, concerns, or worsening symptoms.

## 2023-02-09 ENCOUNTER — HOSPITAL ENCOUNTER (OUTPATIENT)
Dept: CARDIOLOGY | Facility: HOSPITAL | Age: 68
Discharge: HOME | End: 2023-02-09
Payer: MEDICARE

## 2023-02-15 ENCOUNTER — HOSPITAL ENCOUNTER (OUTPATIENT)
Dept: CARDIOLOGY | Facility: HOSPITAL | Age: 68
Discharge: HOME | End: 2023-02-15
Admitting: NURSE PRACTITIONER
Payer: MEDICARE

## 2023-02-15 VITALS
BODY MASS INDEX: 30.65 KG/M2 | WEIGHT: 173 LBS | DIASTOLIC BLOOD PRESSURE: 99 MMHG | HEIGHT: 63 IN | SYSTOLIC BLOOD PRESSURE: 139 MMHG

## 2023-02-15 DIAGNOSIS — R00.2 PALPITATIONS: ICD-10-CM

## 2023-02-15 DIAGNOSIS — R06.09 DYSPNEA ON EXERTION: ICD-10-CM

## 2023-02-15 LAB
ASCENDING AORTA: 3.1 CM
BH CV ECHO MEAS - EDV(CUBED): 91.1 ML
BH CV ECHO MEAS - EDV(MOD-SP2): 57.5 ML
BH CV ECHO MEAS - EDV(MOD-SP4): 78 ML
BH CV ECHO MEAS - EF(MOD-BP): 59.7 %
BH CV ECHO MEAS - EF(MOD-SP2): 64.2 %
BH CV ECHO MEAS - EF(MOD-SP4): 56 %
BH CV ECHO MEAS - ESV(CUBED): 50.7 ML
BH CV ECHO MEAS - ESV(MOD-SP2): 20.6 ML
BH CV ECHO MEAS - ESV(MOD-SP4): 34.3 ML
BH CV ECHO MEAS - FS: 17.8 %
BH CV ECHO MEAS - IVS/LVPW: 0.8 CM
BH CV ECHO MEAS - IVSD: 0.8 CM
BH CV ECHO MEAS - LA DIMENSION: 2.6 CM
BH CV ECHO MEAS - LAT PEAK E' VEL: 10.7 CM/SEC
BH CV ECHO MEAS - LV DIASTOLIC VOL/BSA (35-75): 42.9 CM2
BH CV ECHO MEAS - LV MASS(C)D: 132.8 GRAMS
BH CV ECHO MEAS - LV MAX PG: 3.8 MMHG
BH CV ECHO MEAS - LV MEAN PG: 2 MMHG
BH CV ECHO MEAS - LV SYSTOLIC VOL/BSA (12-30): 18.9 CM2
BH CV ECHO MEAS - LV V1 MAX: 96.9 CM/SEC
BH CV ECHO MEAS - LV V1 VTI: 18 CM
BH CV ECHO MEAS - LVIDD: 4.5 CM
BH CV ECHO MEAS - LVIDS: 3.7 CM
BH CV ECHO MEAS - LVOT AREA: 3.1 CM2
BH CV ECHO MEAS - LVOT DIAM: 2 CM
BH CV ECHO MEAS - LVPWD: 1 CM
BH CV ECHO MEAS - MED PEAK E' VEL: 9.3 CM/SEC
BH CV ECHO MEAS - MV A MAX VEL: 101 CM/SEC
BH CV ECHO MEAS - MV DEC TIME: 0.28 MSEC
BH CV ECHO MEAS - MV E MAX VEL: 65.5 CM/SEC
BH CV ECHO MEAS - MV E/A: 0.65
BH CV ECHO MEAS - PA ACC TIME: 0.13 SEC
BH CV ECHO MEAS - PA PR(ACCEL): 21.2 MMHG
BH CV ECHO MEAS - RAP SYSTOLE: 5 MMHG
BH CV ECHO MEAS - RVSP: 27 MMHG
BH CV ECHO MEAS - SI(MOD-SP2): 20.3 ML/M2
BH CV ECHO MEAS - SI(MOD-SP4): 24 ML/M2
BH CV ECHO MEAS - SV(LVOT): 56.5 ML
BH CV ECHO MEAS - SV(MOD-SP2): 36.9 ML
BH CV ECHO MEAS - SV(MOD-SP4): 43.7 ML
BH CV ECHO MEAS - TAPSE (>1.6): 2.41 CM
BH CV ECHO MEAS - TR MAX PG: 21.5 MMHG
BH CV ECHO MEAS - TR MAX VEL: 232 CM/SEC
BH CV ECHO MEASUREMENTS AVERAGE E/E' RATIO: 6.55
BH CV XLRA - TDI S': 14 CM/SEC
LEFT ATRIUM VOLUME INDEX: 18.8 ML/M2
LV EF 2D ECHO EST: 56 %
MAXIMAL PREDICTED HEART RATE: 154 BPM
SINUS: 3.1 CM
STRESS TARGET HR: 131 BPM

## 2023-02-15 PROCEDURE — 93306 TTE W/DOPPLER COMPLETE: CPT

## 2023-02-15 PROCEDURE — 93306 TTE W/DOPPLER COMPLETE: CPT | Performed by: INTERNAL MEDICINE

## 2023-02-16 ENCOUNTER — TELEPHONE (OUTPATIENT)
Dept: CARDIOLOGY | Facility: HOSPITAL | Age: 68
End: 2023-02-16
Payer: MEDICARE

## 2023-02-16 NOTE — TELEPHONE ENCOUNTER
Please call patient    Echocardiogram results have been reviewed.  Results acceptable.  Normal heart muscle strength.  No concerning valvular disease.    We will discuss results further at follow-up visit.

## 2023-03-10 ENCOUNTER — OFFICE VISIT (OUTPATIENT)
Dept: CARDIOLOGY | Facility: HOSPITAL | Age: 68
End: 2023-03-10
Payer: MEDICARE

## 2023-03-10 VITALS
SYSTOLIC BLOOD PRESSURE: 137 MMHG | HEIGHT: 63 IN | TEMPERATURE: 97 F | WEIGHT: 175.2 LBS | HEART RATE: 90 BPM | OXYGEN SATURATION: 95 % | DIASTOLIC BLOOD PRESSURE: 74 MMHG | BODY MASS INDEX: 31.04 KG/M2 | RESPIRATION RATE: 18 BRPM

## 2023-03-10 DIAGNOSIS — I25.119 CORONARY ARTERY DISEASE INVOLVING NATIVE CORONARY ARTERY OF NATIVE HEART WITH ANGINA PECTORIS: Primary | ICD-10-CM

## 2023-03-10 DIAGNOSIS — E78.1 HYPERTRIGLYCERIDEMIA: ICD-10-CM

## 2023-03-10 DIAGNOSIS — R06.09 DYSPNEA ON EXERTION: ICD-10-CM

## 2023-03-10 DIAGNOSIS — Z82.49 FAMILY HISTORY OF PREMATURE CAD: ICD-10-CM

## 2023-03-10 DIAGNOSIS — R00.2 PALPITATIONS: ICD-10-CM

## 2023-03-10 PROCEDURE — 99214 OFFICE O/P EST MOD 30 MIN: CPT | Performed by: NURSE PRACTITIONER

## 2023-03-10 PROCEDURE — 1159F MED LIST DOCD IN RCRD: CPT | Performed by: NURSE PRACTITIONER

## 2023-03-10 PROCEDURE — 1160F RVW MEDS BY RX/DR IN RCRD: CPT | Performed by: NURSE PRACTITIONER

## 2023-03-10 NOTE — PROGRESS NOTES
"[facility], [facility] Heart [facility] Vascular    Chief Complaint  Chest Pain    Subjective    History of Present Illness {  Problem List  Visit  Diagnosis   Encounters  Notes  Medications  Labs  Result Review Imaging  Media :23}     Iliana Barriga presents to [facility] CARDIOLOGY for   History of Present Illness     67-year-old female with history of hyperlipidemia, GERD, hearing loss, hypothyroidism, Ménière's disease, CAD (mild coronary artery calcifications noted on CT chest)    Follow-up on shortness of breath and fatigue, chest pain and pressure (exertional) after COVID and pneumonia October 2022.  Patient had been complaining of intermittent palpitations.  A Holter was discussed but declined at that time.    Patient with family history of premature CAD in a first-degree relative, father MI in his 40's    Myocardial perfusion stress test was ordered on 1/27/2023: Not completed.  Reported she was sick that day with nausea/vomiting.     Echocardiogram 2/15/2023: EF 61 to 65% RVSP less than 27 mmHg, no significant valvular disease.    Pts she is feeling better over all but she continued to have PHILLIPS, decreased activity tolerance.  She report recurrence respiratory illnesses since last visit.   patient reports an occasion with bad indigestion, CP with radiating jaw pain.      Palpitations have improved.          Objective     Vital Signs:   Vitals:    03/10/23 0904   BP: 137/74   BP Location: Left arm   Patient Position: Sitting   Cuff Size: Adult   Pulse: 90   Resp: 18   Temp: 97 °F (36.1 °C)   TempSrc: Temporal   SpO2: 95%   Weight: 79.5 kg (175 lb 3.2 oz)   Height: 160 cm (63\")     Body mass index is 31.04 kg/m².  Physical Exam  Vitals reviewed.   Constitutional:       General: She is not in acute distress.     Appearance: Normal appearance.   Cardiovascular:      Rate and Rhythm: Normal rate and regular rhythm.      Heart sounds: Normal heart sounds. "   Pulmonary:      Effort: Pulmonary effort is normal.      Breath sounds: Normal breath sounds.   Musculoskeletal:      Right lower leg: No edema.      Left lower leg: No edema.   Skin:     General: Skin is warm and dry.   Neurological:      Mental Status: She is alert.   Psychiatric:         Mood and Affect: Mood normal.         Behavior: Behavior is cooperative.              Result Review  Data Reviewed:{ Labs  Result Review  Imaging  Med Tab  Media :23}   Echocardiogram 2/15/2023: EF 61 to 65% RVSP less than 27 mmHg, no significant valvular disease    Lab Results   Component Value Date    WBC 9.35 12/19/2022    HGB 14.9 12/19/2022    HCT 43.5 12/19/2022    MCV 91.2 12/19/2022     12/19/2022     Lab Results   Component Value Date    GLUCOSE 93 12/19/2022    BUN 21 12/19/2022    CREATININE 0.80 12/19/2022    EGFR 81.4 12/19/2022    BCR 26.3 (H) 12/19/2022    K 4.1 12/19/2022    CO2 25.0 12/19/2022    CALCIUM 10.2 12/19/2022    ALBUMIN 4.60 12/19/2022    BILITOT 0.2 12/19/2022    AST 16 12/19/2022    ALT 13 12/19/2022         Lab Results   Component Value Date    CHOL 182 07/01/2022    CHLPL 191 07/13/2021     Lab Results   Component Value Date    TRIG 296 (H) 07/01/2022    TRIG 214 (H) 07/13/2021     Lab Results   Component Value Date    HDL 56 07/01/2022    HDL 55 07/13/2021     Lab Results   Component Value Date    LDL 78 07/01/2022    LDL 93.2 07/13/2021                   Assessment and Plan {CC Problem List  Visit Diagnosis  ROS  Review (Popup)  Health Maintenance  Quality  BestPractice  Medications  SmartSets  SnapShot Encounters  Media :23}   1. Coronary artery disease involving native coronary artery of native heart with angina pectoris (HCC)  Pt with decreased activity tolerance, PHILLIPS, premature family hx of CAD, obesity, HTN, CP with radiating s/s.    Reschedule previously ordered Lexiscan  Decreased activity tolerance, and dyspnea.      Asa, statin    2. Dyspnea on exertion  Normal  echo    3. Palpitations  improved    4. Hypertriglyceridemia  statin    5. Family history of premature CAD  Father MI 40's.    6. HTN    Continue Dyazide        Follow Up {Instructions Charge Capture  Follow-up Communications :23}   Return in about 6 months (around 9/10/2023) for Office visit, HTN, Chest pain.    Patient was given instructions and counseling regarding her condition or for health maintenance advice. Please see specific information pulled into the AVS if appropriate.  Patient was instructed to call the [facility] with any questions, concerns, or worsening symptoms.

## 2023-03-21 ENCOUNTER — HOSPITAL ENCOUNTER (OUTPATIENT)
Dept: CARDIOLOGY | Facility: HOSPITAL | Age: 68
Discharge: HOME | End: 2023-03-21
Admitting: NURSE PRACTITIONER
Payer: MEDICARE

## 2023-03-21 DIAGNOSIS — R07.9 CHEST PAIN, UNSPECIFIED TYPE: ICD-10-CM

## 2023-03-21 DIAGNOSIS — R06.09 DYSPNEA ON EXERTION: ICD-10-CM

## 2023-03-21 DIAGNOSIS — Z82.49 FAMILY HISTORY OF PREMATURE CAD: ICD-10-CM

## 2023-03-21 DIAGNOSIS — R68.89 DECREASED ACTIVITY TOLERANCE: ICD-10-CM

## 2023-03-21 DIAGNOSIS — I25.119 CORONARY ARTERY DISEASE INVOLVING NATIVE CORONARY ARTERY OF NATIVE HEART WITH ANGINA PECTORIS: ICD-10-CM

## 2023-03-21 LAB
BH CV REST NUCLEAR ISOTOPE DOSE: 9.8 MCI
BH CV STRESS BP STAGE 2: NORMAL
BH CV STRESS BP STAGE 4: NORMAL
BH CV STRESS COMMENTS STAGE 1: NORMAL
BH CV STRESS DOSE REGADENOSON STAGE 1: 0.4
BH CV STRESS DURATION MIN STAGE 1: 1
BH CV STRESS DURATION MIN STAGE 2: 1
BH CV STRESS DURATION MIN STAGE 3: 1
BH CV STRESS DURATION MIN STAGE 4: 1
BH CV STRESS DURATION SEC STAGE 1: 0
BH CV STRESS DURATION SEC STAGE 2: 0
BH CV STRESS DURATION SEC STAGE 3: 0
BH CV STRESS DURATION SEC STAGE 4: 0
BH CV STRESS HR STAGE 1: 93
BH CV STRESS HR STAGE 2: 108
BH CV STRESS HR STAGE 3: 109
BH CV STRESS HR STAGE 4: 100
BH CV STRESS NUCLEAR ISOTOPE DOSE: 33 MCI
BH CV STRESS O2 STAGE 1: 96
BH CV STRESS O2 STAGE 2: 98
BH CV STRESS O2 STAGE 3: 98
BH CV STRESS O2 STAGE 4: 97
BH CV STRESS PROTOCOL 1: NORMAL
BH CV STRESS RECOVERY BP: NORMAL MMHG
BH CV STRESS RECOVERY HR: 96 BPM
BH CV STRESS RECOVERY O2: 96 %
BH CV STRESS STAGE 1: 1
BH CV STRESS STAGE 2: 2
BH CV STRESS STAGE 3: 3
BH CV STRESS STAGE 4: 4
LV EF NUC BP: 72 %
MAXIMAL PREDICTED HEART RATE: 154 BPM
PERCENT MAX PREDICTED HR: 72.73 %
STRESS BASELINE BP: NORMAL MMHG
STRESS BASELINE HR: 86 BPM
STRESS O2 SAT REST: 97 %
STRESS PERCENT HR: 86 %
STRESS POST ESTIMATED WORKLOAD: 1 METS
STRESS POST EXERCISE DUR MIN: 4 MIN
STRESS POST EXERCISE DUR SEC: 0 SEC
STRESS POST O2 SAT PEAK: 97 %
STRESS POST PEAK BP: NORMAL MMHG
STRESS POST PEAK HR: 112 BPM
STRESS TARGET HR: 131 BPM

## 2023-03-21 PROCEDURE — 0 TECHNETIUM SESTAMIBI: Performed by: NURSE PRACTITIONER

## 2023-03-21 PROCEDURE — 78452 HT MUSCLE IMAGE SPECT MULT: CPT | Performed by: INTERNAL MEDICINE

## 2023-03-21 PROCEDURE — 93017 CV STRESS TEST TRACING ONLY: CPT

## 2023-03-21 PROCEDURE — 93018 CV STRESS TEST I&R ONLY: CPT | Performed by: INTERNAL MEDICINE

## 2023-03-21 PROCEDURE — A9500 TC99M SESTAMIBI: HCPCS | Performed by: NURSE PRACTITIONER

## 2023-03-21 PROCEDURE — 78452 HT MUSCLE IMAGE SPECT MULT: CPT

## 2023-03-21 PROCEDURE — 25010000002 REGADENOSON 0.4 MG/5ML SOLUTION: Performed by: NURSE PRACTITIONER

## 2023-03-21 RX ADMIN — REGADENOSON 0.4 MG: 0.08 INJECTION, SOLUTION INTRAVENOUS at 09:25

## 2023-03-21 RX ADMIN — TECHNETIUM TC 99M SESTAMIBI 1 DOSE: 1 INJECTION INTRAVENOUS at 09:35

## 2023-03-21 RX ADMIN — TECHNETIUM TC 99M SESTAMIBI 1 DOSE: 1 INJECTION INTRAVENOUS at 07:46

## 2023-03-22 ENCOUNTER — TELEPHONE (OUTPATIENT)
Dept: CARDIOLOGY | Facility: HOSPITAL | Age: 68
End: 2023-03-22
Payer: MEDICARE

## 2023-03-22 NOTE — TELEPHONE ENCOUNTER
Please call patient with results.  Her myocardial perfusion stress test results completed on 3/21/2023 have been reviewed.  Her heart muscle function is normal.  No evidence of a heart blockage affecting blood flow to her heart muscle during her stress test.  Her results are considered acceptable.

## 2023-03-29 ENCOUNTER — TELEPHONE (OUTPATIENT)
Dept: INTERNAL MEDICINE | Facility: CLINIC | Age: 68
End: 2023-03-29

## 2023-03-29 ENCOUNTER — OFFICE VISIT (OUTPATIENT)
Dept: INTERNAL MEDICINE | Facility: CLINIC | Age: 68
End: 2023-03-29
Payer: MEDICARE

## 2023-03-29 VITALS
OXYGEN SATURATION: 93 % | HEIGHT: 63 IN | HEART RATE: 82 BPM | BODY MASS INDEX: 31.43 KG/M2 | WEIGHT: 177.4 LBS | RESPIRATION RATE: 16 BRPM | TEMPERATURE: 98.2 F | DIASTOLIC BLOOD PRESSURE: 84 MMHG | SYSTOLIC BLOOD PRESSURE: 108 MMHG

## 2023-03-29 DIAGNOSIS — R06.09 COVID-19 LONG HAULER MANIFESTING CHRONIC DYSPNEA: ICD-10-CM

## 2023-03-29 DIAGNOSIS — T78.40XA ALLERGIC REACTION, INITIAL ENCOUNTER: Primary | ICD-10-CM

## 2023-03-29 DIAGNOSIS — U09.9 COVID-19 LONG HAULER MANIFESTING CHRONIC DYSPNEA: ICD-10-CM

## 2023-03-29 RX ORDER — METHYLPREDNISOLONE 4 MG/1
TABLET ORAL
Qty: 21 TABLET | Refills: 0 | Status: SHIPPED | OUTPATIENT
Start: 2023-03-29

## 2023-03-29 NOTE — TELEPHONE ENCOUNTER
PATIENT WANTED TO GIVE DENTIST INFORMATION TO LINDSEY. SHE SEE'S [facility]UA, THEIR PHONE NUMBER [phone]. THEIR ADDRESS IS [address] [place] [address].

## 2023-03-29 NOTE — PROGRESS NOTES
"Patient Name: Iliana Barriga  : 1955   MRN: [id number]     Chief Complaint:    Chief Complaint   Patient presents with   • Rash     All over chest since the day after her stress test       History of Present Illness: Iliana Barriga is a 67 y.o. female presents to clinic for a rash.     Rash  The patient reports she went in for a stress test on 2023 and woke up the next morning with a rash on her chest and neck. She states it is \"itching like fire\". She reports 2 days later, she developed a \"thick mucousy\" cough. She inquires if she had a reaction to the medication she was given for the stress test. She was given Regadenoson and Technetium Tc 99m Sestamibi. The patient has also experienced sneezing and watery eyes. She has had difficulty in talking without starting to cough. The patient states she tries to cough up the mucous but is unable to. The patient states she experiences dyspnea if she tells a story. The patient has experienced wheezing and uses her inhaler with relief.    She denies any new lotions, medications, clothing, and detergent. She denies any fever, chills, rhinorrhea, sinus pain and pressure. She does not believe this is COVID-19 related, and declined the test.     COVID-19 long hauler manifesting chronic dyspnea  The patient reports since having COVID-19 her dyspnea has worsened. She states she does not believe she is back at her baseline yet. She would like to see a pulmonologist. She reports using her albuterol more than twice a week. CT of chest showed atelectasis and scarring in the middle of her lungs, but there was no pulmonary nodules found. She reports she is concerned about steroids due to her bones, she would rather have an inhaler without steroids.  Past Medical History:   Diagnosis Date   • Acquired hypothyroidism    • Cancer (HCC)    • Colon polyp    • Diverticulosis    • GERD (gastroesophageal reflux disease)    • History of uterine cancer     Tx - " hysterectomy and LN's; no chemo; no XRT   • HL (hearing loss)    • Meniere disease 1994   • Peptic ulceration        Subjective     Review of System: Review of Systems   HENT: Positive for congestion and sneezing. Negative for ear pain and rhinorrhea.    Respiratory: Positive for cough, shortness of breath (a little with talking) and wheezing.    Skin: Positive for rash.        Medications:     Current Outpatient Medications:   •  albuterol sulfate  (90 Base) MCG/ACT inhaler, Inhale 2 puffs Every 6 (Six) Hours As Needed for Wheezing or Shortness of Air (cough)., Disp: 18 g, Rfl: 3  •  aspirin 81 MG EC tablet, Take 1 tablet by mouth Daily., Disp: 90 tablet, Rfl: 0  •  Atrovent HFA 17 MCG/ACT inhaler, Inhale 1 puff 2 (Two) Times a Day., Disp: 1 each, Rfl: 3  •  azelastine (ASTELIN) 0.1 % nasal spray, 2 sprays into the nostril(s) as directed by provider., Disp: , Rfl:   •  [employer] D3 1000 units capsule, Take  by mouth Daily., Disp: , Rfl: 1  •  cyclobenzaprine (FLEXERIL) 5 MG tablet, Take 1 tablet by mouth Every 8 (Eight) Hours., Disp: 30 tablet, Rfl: 0  •  estrogens, conjugated, (Premarin) 0.625 MG tablet, Take 1 tablet by mouth Daily. Take daily for 21 days then do not take for 7 days., Disp: 90 tablet, Rfl: 1  •  fluticasone (FLONASE) 50 MCG/ACT nasal spray, 2 sprays into the nostril(s) as directed by provider Daily., Disp: , Rfl:   •  linaclotide (LINZESS) 72 MCG capsule capsule, Take 1 capsule by mouth Every Morning Before Breakfast., Disp: 90 capsule, Rfl: 0  •  Magnesium Oxide 500 MG tablet,  0 Refill(s), Disp: , Rfl:   •  Multiple Vitamins-Minerals (MULTIVITAMIN ADULT EXTRA C PO), Take 1 tablet by mouth Daily., Disp: , Rfl:   •  omeprazole (priLOSEC) 20 MG capsule, TAKE 1 CAPSULE BY MOUTH EVERY DAY, Disp: 90 capsule, Rfl: 0  •  rosuvastatin (Crestor) 5 MG tablet, Take 1 tablet by mouth Daily., Disp: 90 tablet, Rfl: 0  •  Synthroid 25 MCG tablet, TAKE 1 TABLET BY MOUTH EVERY DAY, Disp: 90 tablet, Rfl: 1  •   "tretinoin (RETIN-A) 0.025 % cream, APPLY 1 APPLICATION TOPICALLY TO THE APPROPRIATE AREA AS DIRECTED EVERY NIGHT., Disp: 45 g, Rfl: 3  •  triamterene-hydrochlorothiazide (DYAZIDE) 37.5-25 MG per capsule, TAKE 1 CAPSULE BY MOUTH EVERY DAY IN THE MORNING, Disp: 90 capsule, Rfl: 1  •  Xifaxan 550 MG tablet, Take 1 tablet by mouth 3 (Three) Times a Day., Disp: , Rfl:   •  methylPREDNISolone (MEDROL) 4 MG dose pack, Take as directed on package instructions., Disp: 21 tablet, Rfl: 0    Allergies:   Allergies   Allergen Reactions   • Sulfamethoxazole-Trimethoprim Itching, Swelling and Hives     Throat felt tight and arm swelled  Throat felt tight and arm swelled     • Penicillins Hives   • Regadenoson Hives     Rash after stress test.    • Technetium Tc 99m Sestamibi Hives       Objective     Physical Exam:   Vital Signs:   Vitals:    03/29/23 1149   BP: 108/84   BP Location: Right arm   Patient Position: Sitting   Cuff Size: Adult   Pulse: 82   Resp: 16   Temp: 98.2 °F (36.8 °C)   TempSrc: Infrared   SpO2: 93%   Weight: 80.5 kg (177 lb 6.4 oz)   Height: 160 cm (63\")   PainSc: 0-No pain     Body mass index is 31.42 kg/m². BMI is >= 30 and <35. (Class 1 Obesity). The following options were offered after discussion;: weight loss educational material (shared in after visit summary)      Physical Exam  Constitutional:       General: She is not in acute distress.     Appearance: She is not ill-appearing.   HENT:      Head: Normocephalic.   Cardiovascular:      Rate and Rhythm: Normal rate and regular rhythm.      Heart sounds: Normal heart sounds. No murmur heard.  Pulmonary:      Breath sounds: Normal breath sounds.   Abdominal:      General: Bowel sounds are normal.      Tenderness: There is no abdominal tenderness.   Skin:     Findings: Rash present.   Neurological:      General: No focal deficit present.      Mental Status: She is oriented to person, place, and time.   Psychiatric:         Mood and Affect: Mood normal. "         Assessment / Plan      Assessment/Plan:   Diagnoses and all orders for this visit:    1. Allergic reaction, initial encounter (Primary)  -     methylPREDNISolone (MEDROL) 4 MG dose pack; Take as directed on package instructions.  Dispense: 21 tablet; Refill: 0    2. COVID-19 long hauler manifesting chronic dyspnea  -     Ambulatory Referral to Pulmonology         Allergic reaction/flare up  - Will prescribe a Medrol dosepak  - Patient will continue her inhaler as needed.  - Informed her she can use Benadryl or Zyrtec.    COVID-19 long hauler manifesting chronic dyspnea  - Will refer the patient to pulmonary.        Follow Up:   Return in about 3 months (around 6/29/2023) for Annual.    CECILY Aguila  [facility]    Transcribed from ambient dictation for CECILY Aguila by Katherine Ness.  03/29/23   15:03 EDT    Patient or patient representative verbalized consent to the visit recording.  I have personally performed the services described in this document as transcribed by the above individual, and it is both accurate and complete.

## 2023-03-29 NOTE — PATIENT INSTRUCTIONS
MyPlate from USDA  MyPlate is an outline of a general healthy diet based on the Dietary Guidelines for Americans, 7867-8230, from the U.S. Department of Agriculture (USDA). It sets guidelines for how much food you should eat from each food group based on your age, sex, and level of physical activity.  What are tips for following MyPlate?  To follow MyPlate recommendations:  Eat a wide variety of fruits and vegetables, grains, and protein foods.  Serve smaller portions and eat less food throughout the day.  Limit portion sizes to avoid overeating.  Enjoy your food.  Get at least 150 minutes of exercise every week. This is about 30 minutes each day, 5 or more days per week.  It can be difficult to have every meal look like MyPlate. Think about MyPlate as eating guidelines for an entire day, rather than each individual meal.  Fruits and vegetables  Make one half of your plate fruits and vegetables.  Eat many different colors of fruits and vegetables each day.  For a 2,000-calorie daily food plan, eat:  2½ cups of vegetables every day.  2 cups of fruit every day.  1 cup is equal to:  1 cup raw or cooked vegetables.  1 cup raw fruit.  1 medium-sized orange, apple, or banana.  1 cup 100% fruit or vegetable juice.  2 cups raw leafy greens, such as lettuce, spinach, or kale.  ½ cup dried fruit.  Grains  One fourth of your plate should be grains.  Make at least half of the grains you eat each day whole grains.  For a 2,000-calorie daily food plan, eat 6 oz of grains every day.  1 oz is equal to:  1 slice bread.  1 cup cereal.  ½ cup cooked rice, cereal, or pasta.  Protein  One fourth of your plate should be protein.  Eat a wide variety of protein foods, including meat, poultry, fish, eggs, beans, nuts, and tofu.  For a 2,000-calorie daily food plan, eat 5½ oz of protein every day.  1 oz is equal to:  1 oz meat, poultry, or fish.  ¼ cup cooked beans.  1 egg.  ½ oz nuts or seeds.  1 Tbsp peanut butter.  Dairy  Drink fat-free  or low-fat (1%) milk.  Eat or drink dairy as a side to meals.  For a 2,000-calorie daily food plan, eat or drink 3 cups of dairy every day.  1 cup is equal to:  1 cup milk, yogurt, cottage cheese, or soy milk (soy beverage).  2 oz processed cheese.  1½ oz natural cheese.  Fats, oils, salt, and sugars  Only small amounts of oils are recommended.  Avoid foods that are high in calories and low in nutritional value (empty calories), like foods high in fat or added sugars.  Choose foods that are low in salt (sodium). Choose foods that have less than 140 milligrams (mg) of sodium per serving.  Drink water instead of sugary drinks. Drink enough fluid to keep your urine pale yellow.  Where to find support  Work with your health care provider or a dietitian to develop a customized eating plan that is right for you.  Download an brian (mobile application) to help you track your daily food intake.  Where to find more information  USDA: ChooseMyPlate.gov  Summary  MyPlate is a general guideline for healthy eating from the USDA. It is based on the Dietary Guidelines for Americans, 9810-1947.  In general, fruits and vegetables should take up one half of your plate, grains should take up one fourth of your plate, and protein should take up one fourth of your plate.  This information is not intended to replace advice given to you by your health care provider. Make sure you discuss any questions you have with your health care provider.  Document Revised: 11/08/2021 Document Reviewed: 11/08/2021  [employer] Patient Education © 2022 [employer]

## 2023-04-04 DIAGNOSIS — I25.10 CORONARY ARTERY DISEASE INVOLVING NATIVE HEART WITHOUT ANGINA PECTORIS, UNSPECIFIED VESSEL OR LESION TYPE: ICD-10-CM

## 2023-04-04 RX ORDER — ROSUVASTATIN CALCIUM 5 MG/1
TABLET, COATED ORAL
Qty: 90 TABLET | Refills: 1 | Status: SHIPPED | OUTPATIENT
Start: 2023-04-04

## 2023-05-04 ENCOUNTER — OFFICE VISIT (OUTPATIENT)
Dept: INTERNAL MEDICINE | Facility: CLINIC | Age: 68
End: 2023-05-04
Payer: MEDICARE

## 2023-05-04 VITALS
TEMPERATURE: 97.5 F | WEIGHT: 178.2 LBS | DIASTOLIC BLOOD PRESSURE: 90 MMHG | HEIGHT: 63 IN | BODY MASS INDEX: 31.57 KG/M2 | RESPIRATION RATE: 18 BRPM | SYSTOLIC BLOOD PRESSURE: 126 MMHG | HEART RATE: 88 BPM

## 2023-05-04 DIAGNOSIS — M25.541 ARTHRALGIA OF RIGHT HAND: ICD-10-CM

## 2023-05-04 DIAGNOSIS — Z20.818 EXPOSURE TO STREP THROAT: ICD-10-CM

## 2023-05-04 DIAGNOSIS — R21 RASH: Primary | ICD-10-CM

## 2023-05-04 DIAGNOSIS — B00.1 FEVER BLISTER: ICD-10-CM

## 2023-05-04 LAB
ALBUMIN SERPL-MCNC: 4.2 G/DL (ref 3.5–5.2)
ALBUMIN/GLOB SERPL: 1.6 G/DL
ALP SERPL-CCNC: 59 U/L (ref 39–117)
ALT SERPL W P-5'-P-CCNC: 20 U/L (ref 1–33)
ANION GAP SERPL CALCULATED.3IONS-SCNC: 10.9 MMOL/L (ref 5–15)
ASO AB SERPL-ACNC: NEGATIVE [IU]/ML
AST SERPL-CCNC: 19 U/L (ref 1–32)
BASOPHILS # BLD MANUAL: 0.07 10*3/MM3 (ref 0–0.2)
BASOPHILS NFR BLD MANUAL: 1 % (ref 0–1.5)
BILIRUB BLD-MCNC: NEGATIVE MG/DL
BILIRUB SERPL-MCNC: 0.2 MG/DL (ref 0–1.2)
BUN SERPL-MCNC: 19 MG/DL (ref 8–23)
BUN/CREAT SERPL: 31.1 (ref 7–25)
C3 SERPL-MCNC: 130 MG/DL (ref 82–167)
C4 SERPL-MCNC: 28 MG/DL (ref 14–44)
CALCIUM SPEC-SCNC: 9.5 MG/DL (ref 8.6–10.5)
CHLORIDE SERPL-SCNC: 103 MMOL/L (ref 98–107)
CHROMATIN AB SERPL-ACNC: <10 IU/ML (ref 0–14)
CLARITY, POC: CLEAR
CO2 SERPL-SCNC: 24.1 MMOL/L (ref 22–29)
COLOR UR: YELLOW
CREAT SERPL-MCNC: 0.61 MG/DL (ref 0.57–1)
CRP SERPL-MCNC: 0.53 MG/DL (ref 0–0.5)
DEPRECATED RDW RBC AUTO: 38.5 FL (ref 37–54)
EGFRCR SERPLBLD CKD-EPI 2021: 98.7 ML/MIN/1.73
EOSINOPHIL # BLD MANUAL: 0.2 10*3/MM3 (ref 0–0.4)
EOSINOPHIL NFR BLD MANUAL: 3 % (ref 0.3–6.2)
ERYTHROCYTE [DISTWIDTH] IN BLOOD BY AUTOMATED COUNT: 12.2 % (ref 12.3–15.4)
ERYTHROCYTE [SEDIMENTATION RATE] IN BLOOD: 4 MM/HR (ref 0–30)
EXPIRATION DATE: NORMAL
GLOBULIN UR ELPH-MCNC: 2.6 GM/DL
GLUCOSE SERPL-MCNC: 95 MG/DL (ref 65–99)
GLUCOSE UR STRIP-MCNC: NEGATIVE MG/DL
HCT VFR BLD AUTO: 37.5 % (ref 34–46.6)
HGB BLD-MCNC: 13.2 G/DL (ref 12–15.9)
KETONES UR QL: NEGATIVE
LEUKOCYTE EST, POC: NEGATIVE
LYMPHOCYTES # BLD MANUAL: 2.35 10*3/MM3 (ref 0.7–3.1)
LYMPHOCYTES NFR BLD MANUAL: 19.2 % (ref 5–12)
Lab: NORMAL
MCH RBC QN AUTO: 30.8 PG (ref 26.6–33)
MCHC RBC AUTO-ENTMCNC: 35.2 G/DL (ref 31.5–35.7)
MCV RBC AUTO: 87.4 FL (ref 79–97)
MONOCYTES # BLD: 1.28 10*3/MM3 (ref 0.1–0.9)
NEUTROPHILS # BLD AUTO: 2.75 10*3/MM3 (ref 1.7–7)
NEUTROPHILS NFR BLD MANUAL: 41.4 % (ref 42.7–76)
NITRITE UR-MCNC: NEGATIVE MG/ML
PH UR: 7 [PH] (ref 5–8)
PLAT MORPH BLD: NORMAL
PLATELET # BLD AUTO: 164 10*3/MM3 (ref 140–450)
PMV BLD AUTO: 12.2 FL (ref 6–12)
POTASSIUM SERPL-SCNC: 4.1 MMOL/L (ref 3.5–5.2)
PROT SERPL-MCNC: 6.8 G/DL (ref 6–8.5)
PROT UR STRIP-MCNC: NEGATIVE MG/DL
RBC # BLD AUTO: 4.29 10*6/MM3 (ref 3.77–5.28)
RBC # UR STRIP: NEGATIVE /UL
RBC MORPH BLD: NORMAL
SODIUM SERPL-SCNC: 138 MMOL/L (ref 136–145)
SP GR UR: 1.01 (ref 1–1.03)
URATE SERPL-MCNC: 6.6 MG/DL (ref 2.4–5.7)
UROBILINOGEN UR QL: NORMAL
VARIANT LYMPHS NFR BLD MANUAL: 35.4 % (ref 19.6–45.3)
WBC MORPH BLD: NORMAL
WBC NRBC COR # BLD: 6.65 10*3/MM3 (ref 3.4–10.8)

## 2023-05-04 PROCEDURE — 86063 ANTISTREPTOLYSIN O SCREEN: CPT | Performed by: NURSE PRACTITIONER

## 2023-05-04 PROCEDURE — 84550 ASSAY OF BLOOD/URIC ACID: CPT | Performed by: NURSE PRACTITIONER

## 2023-05-04 PROCEDURE — 86225 DNA ANTIBODY NATIVE: CPT | Performed by: NURSE PRACTITIONER

## 2023-05-04 PROCEDURE — 86431 RHEUMATOID FACTOR QUANT: CPT | Performed by: NURSE PRACTITIONER

## 2023-05-04 PROCEDURE — 86160 COMPLEMENT ANTIGEN: CPT | Performed by: NURSE PRACTITIONER

## 2023-05-04 PROCEDURE — 86038 ANTINUCLEAR ANTIBODIES: CPT | Performed by: NURSE PRACTITIONER

## 2023-05-04 PROCEDURE — 85025 COMPLETE CBC W/AUTO DIFF WBC: CPT | Performed by: NURSE PRACTITIONER

## 2023-05-04 PROCEDURE — 80053 COMPREHEN METABOLIC PANEL: CPT | Performed by: NURSE PRACTITIONER

## 2023-05-04 PROCEDURE — 85652 RBC SED RATE AUTOMATED: CPT | Performed by: NURSE PRACTITIONER

## 2023-05-04 PROCEDURE — 86235 NUCLEAR ANTIGEN ANTIBODY: CPT | Performed by: NURSE PRACTITIONER

## 2023-05-04 PROCEDURE — 86037 ANCA TITER EACH ANTIBODY: CPT | Performed by: NURSE PRACTITIONER

## 2023-05-04 PROCEDURE — 83516 IMMUNOASSAY NONANTIBODY: CPT | Performed by: NURSE PRACTITIONER

## 2023-05-04 PROCEDURE — 86140 C-REACTIVE PROTEIN: CPT | Performed by: NURSE PRACTITIONER

## 2023-05-04 PROCEDURE — 85007 BL SMEAR W/DIFF WBC COUNT: CPT | Performed by: NURSE PRACTITIONER

## 2023-05-04 RX ORDER — ACYCLOVIR 50 MG/G
1 OINTMENT TOPICAL 4 TIMES DAILY PRN
Qty: 30 G | Refills: 1 | Status: SHIPPED | OUTPATIENT
Start: 2023-05-04

## 2023-05-04 RX ORDER — AMMONIUM LACTATE 12 G/100G
LOTION TOPICAL AS NEEDED
Qty: 396 G | Refills: 0 | Status: SHIPPED | OUTPATIENT
Start: 2023-05-04

## 2023-05-04 RX ORDER — HYDROXYZINE PAMOATE 25 MG/1
CAPSULE ORAL
Qty: 30 CAPSULE | Refills: 0 | Status: SHIPPED | OUTPATIENT
Start: 2023-05-04

## 2023-05-04 NOTE — PROGRESS NOTES
"Patient Name: Iliana Barriga  : 1955   MRN: [id number]     Chief Complaint:    Chief Complaint   Patient presents with   • Rash     For 1 week. Rash on upper chest up to face. Rash itches.       History of Present Illness: Iliana Barriga is a 67 y.o. female presents to clinic today for a follow-up for rash.    Rash  Ms. Barriga reports she still has the rash on her chest and neck that developed the day after she underwent her stress test in 2023.  At last visit on 2023, she started a course of methylprednisolone (Medrol). She states the rash has calmed down; the itching stopped but the redness did not go away. Approximately 3 days after completing the course of steroids, the rash \"blew up.\" The rash has now spread to her face. The rash itched \"like crazy\" and is in her eyebrows and on her eyelids. The rash itches so bad that she cannot sleep and if she touches her neck, she experiences a burning sensation. She has been utilizing ammonium lactate lotion on the affected areas, which does help. The cream is transforming the  rash into a feeling more like \"sandpaper.\"  She has tried Benadryl which helped initially. She notes having a mild rosacea on her face, but it is usually not \"flame red\" and can easily be covered with makeup.  She mentions being exposed to her daughter who has strep. Her granddaughter also has a rash right now, but it looks different. She denies any new detergent or lotions.     She started experiencing joint pain. Her right middle finger started hurting really bad in 2022 and now it \"catches.\" She mentions her autoimmune titer came up positive about 8 or 9 years ago, but she was not having any symptoms at the time. She recalls testing positive for osteoarthritis on a previous screening. She experiences occasional bilateral hand swelling.     She denies oral or nasal ulcers. She admits to dry eyes and dry mouth. She denies Raynaud's disease, seizures, blood clots, " inflammatory eye disease, or hematuria.     Shortness of breath  She mentions the shortness of breath she had complained of earlier has improved. She was previously treated for pneumonia after which she was feeling better. She started taking vitamin D3 and Vitamin B12 again. She is able to work in the yard. She had to cancel her consultation with pulmonology due to her rash.     Cold sores  She has cold sores on her lips that developed last Wednesday. She always gets cold sores when her stomach is upset.     The patient reports a family history of rheumatoid arthritis.      Subjective     Review of System: Review of Systems   Musculoskeletal:        Positive for right middle finger joint pain.   Skin: Positive for rash.        Positive for rash on chest, neck, and face. Positive for fever blisters.   All other systems reviewed and are negative.       Medications:     Current Outpatient Medications:   •  albuterol sulfate  (90 Base) MCG/ACT inhaler, Inhale 2 puffs Every 6 (Six) Hours As Needed for Wheezing or Shortness of Air (cough)., Disp: 18 g, Rfl: 3  •  aspirin 81 MG EC tablet, Take 1 tablet by mouth Daily., Disp: 90 tablet, Rfl: 0  •  Atrovent HFA 17 MCG/ACT inhaler, Inhale 1 puff 2 (Two) Times a Day., Disp: 1 each, Rfl: 3  •  azelastine (ASTELIN) 0.1 % nasal spray, 2 sprays into the nostril(s) as directed by provider., Disp: , Rfl:   •  [employer]S D3 1000 units capsule, Take  by mouth Daily., Disp: , Rfl: 1  •  cyclobenzaprine (FLEXERIL) 5 MG tablet, Take 1 tablet by mouth Every 8 (Eight) Hours., Disp: 30 tablet, Rfl: 0  •  estrogens, conjugated, (Premarin) 0.625 MG tablet, Take 1 tablet by mouth Daily. Take daily for 21 days then do not take for 7 days., Disp: 90 tablet, Rfl: 1  •  fluticasone (FLONASE) 50 MCG/ACT nasal spray, 2 sprays into the nostril(s) as directed by provider Daily., Disp: , Rfl:   •  linaclotide (LINZESS) 72 MCG capsule capsule, Take 1 capsule by mouth Every Morning Before Breakfast.,  "Disp: 90 capsule, Rfl: 0  •  Magnesium Oxide 500 MG tablet,  0 Refill(s), Disp: , Rfl:   •  Multiple Vitamins-Minerals (MULTIVITAMIN ADULT EXTRA C PO), Take 1 tablet by mouth Daily., Disp: , Rfl:   •  omeprazole (priLOSEC) 20 MG capsule, TAKE 1 CAPSULE BY MOUTH EVERY DAY, Disp: 90 capsule, Rfl: 0  •  rosuvastatin (CRESTOR) 5 MG tablet, TAKE 1 TABLET BY MOUTH EVERY DAY, Disp: 90 tablet, Rfl: 1  •  Synthroid 25 MCG tablet, TAKE 1 TABLET BY MOUTH EVERY DAY, Disp: 90 tablet, Rfl: 1  •  tretinoin (RETIN-A) 0.025 % cream, APPLY 1 APPLICATION TOPICALLY TO THE APPROPRIATE AREA AS DIRECTED EVERY NIGHT., Disp: 45 g, Rfl: 3  •  triamterene-hydrochlorothiazide (DYAZIDE) 37.5-25 MG per capsule, TAKE 1 CAPSULE BY MOUTH EVERY DAY IN THE MORNING, Disp: 90 capsule, Rfl: 1  •  Xifaxan 550 MG tablet, Take 1 tablet by mouth 3 (Three) Times a Day., Disp: , Rfl:   •  acyclovir (ZOVIRAX) 5 % ointment, Apply 1 application topically to the appropriate area as directed 4 (Four) Times a Day As Needed (fever blister)., Disp: 30 g, Rfl: 1  •  ammonium lactate (AmLactin) 12 % lotion, Apply  topically to the appropriate area as directed As Needed for Dry Skin., Disp: 396 g, Rfl: 0  •  hydrOXYzine pamoate (Vistaril) 25 MG capsule, 1-2 tablets by mouth as needed for itching, Disp: 30 capsule, Rfl: 0    Allergies:   Allergies   Allergen Reactions   • Sulfamethoxazole-Trimethoprim Itching, Swelling and Hives     Throat felt tight and arm swelled  Throat felt tight and arm swelled     • Penicillins Hives   • Regadenoson Hives     Rash after stress test.    • Technetium Tc 99m Sestamibi Hives       Objective     Physical Exam:   Vital Signs:   Vitals:    05/04/23 0834   BP: 126/90   BP Location: Right arm   Patient Position: Sitting   Cuff Size: Adult   Pulse: 88   Resp: 18   Temp: 97.5 °F (36.4 °C)   TempSrc: Infrared   Weight: 80.8 kg (178 lb 3.2 oz)   Height: 160 cm (63\")   PainSc: 0-No pain       Physical Exam  Constitutional:       General: She is " not in acute distress.     Appearance: She is not ill-appearing.   HENT:      Head: Normocephalic.   Cardiovascular:      Rate and Rhythm: Normal rate and regular rhythm.      Heart sounds: Normal heart sounds. No murmur heard.  Pulmonary:      Breath sounds: Normal breath sounds.   Abdominal:      General: Bowel sounds are normal.      Tenderness: There is no abdominal tenderness.   Skin:     Findings: Rash present.      Comments:  Erythema of chest, anterior neck, and face. Neck has areas of clearing. Two fever blisters on lips   Neurological:      General: No focal deficit present.      Mental Status: She is oriented to person, place, and time.   Psychiatric:         Mood and Affect: Mood normal.         Assessment / Plan      Assessment/Plan:   Diagnoses and all orders for this visit:    1. Rash (Primary)  -     hydrOXYzine pamoate (Vistaril) 25 MG capsule; 1-2 tablets by mouth as needed for itching  Dispense: 30 capsule; Refill: 0  -     Ambulatory Referral to Dermatology  -     POC Urinalysis Dipstick, Automated  -     ammonium lactate (AmLactin) 12 % lotion; Apply  topically to the appropriate area as directed As Needed for Dry Skin.  Dispense: 396 g; Refill: 0  -     STEFANIA; Future  -     Antistreptolysin O Screen; Future  -     C-reactive Protein; Future  -     Rheumatoid Factor; Future  -     Uric Acid; Future  -     Comprehensive Metabolic Panel; Future  -     CBC & Differential; Future  -     ANCA Panel; Future  -     Anti-DNA Antibody, Double-stranded; Future  -     Anti-scleroderma Antibody; Future  -     C3 Complement; Future  -     C4 Complement; Future  -     Sedimentation Rate; Future  -     STEFANIA  -     Antistreptolysin O Screen  -     C-reactive Protein  -     Rheumatoid Factor  -     Uric Acid  -     Comprehensive Metabolic Panel  -     CBC & Differential  -     ANCA Panel  -     Anti-DNA Antibody, Double-stranded  -     Anti-scleroderma Antibody  -     C3 Complement  -     C4 Complement  -      Sedimentation Rate  -     Manual Differential    2. Fever blister  -     acyclovir (ZOVIRAX) 5 % ointment; Apply 1 application topically to the appropriate area as directed 4 (Four) Times a Day As Needed (fever blister).  Dispense: 30 g; Refill: 1    3. Arthralgia of right hand  -     STEFANIA; Future  -     Antistreptolysin O Screen; Future  -     C-reactive Protein; Future  -     Rheumatoid Factor; Future  -     Uric Acid; Future  -     Comprehensive Metabolic Panel; Future  -     CBC & Differential; Future  -     ANCA Panel; Future  -     Anti-DNA Antibody, Double-stranded; Future  -     Anti-scleroderma Antibody; Future  -     C3 Complement; Future  -     C4 Complement; Future  -     Sedimentation Rate; Future  -     STEFANIA  -     Antistreptolysin O Screen  -     C-reactive Protein  -     Rheumatoid Factor  -     Uric Acid  -     Comprehensive Metabolic Panel  -     CBC & Differential  -     ANCA Panel  -     Anti-DNA Antibody, Double-stranded  -     Anti-scleroderma Antibody  -     C3 Complement  -     C4 Complement  -     Sedimentation Rate  -     Manual Differential    4. Exposure to strep throat  -     POC Rapid Strep A         1. Rash  She will continue utilizing ammonium lactate lotion. A prescription for ammonium lactate (Amlactin) 12 % lotion has been sent. A prescription for hydroxyzine pamoate (Vistaril) 25 mg has been sent to help with itching. The patient will complete lab work today. Orders have been placed for CBC, CMP, STEFANIA, C-reactive protein, rheumatoid factor, ANCA panel, anti-DNA antibody, anti-scleroderma antibody, C3 complement, C4 complement, sedimentation rate, and urinalysis. An ambulatory referral has been placed to dermatology.    2.Exposure to strep throat  The patient completed a point of care rapid strep test which was negative. An order has been placed for antistreptolysin O screen.    3. Cold sores  A prescription for acyclovir (Zovirax) 5 % ointment to be applied topically to affected area  has been sent.     4. Arthralgia  The patient complains of generalized joint pain, especially in right middle finger. She will complete lab work to include CBC, CMP, STEFANIA, C-reactive protein, rheumatoid factor, ANCA panel, anti-DNA antibody, anti-scleroderma antibody, C3 complement, C4 complement, sedimentation rate, and urinalysis.      Follow Up:   I will discuss follow-up pending testing.     CECILY Aguila  [facility]    Transcribed from ambient dictation for CECILY Aguila by Leyda Gutierrez.  05/04/23   12:49 EDT    Patient or patient representative verbalized consent to the visit recording.  I have personally performed the services described in this document as transcribed by the above individual, and it is both accurate and complete.

## 2023-05-05 DIAGNOSIS — E03.9 HYPOTHYROIDISM, UNSPECIFIED TYPE: ICD-10-CM

## 2023-05-05 DIAGNOSIS — H81.02 MENIERE'S DISEASE OF LEFT EAR: ICD-10-CM

## 2023-05-05 LAB
ANA SER QL: NEGATIVE
C-ANCA TITR SER IF: NORMAL TITER
DSDNA AB SER-ACNC: 1 IU/ML (ref 0–9)
ENA SCL70 AB SER-ACNC: <0.2 AI (ref 0–0.9)
MYELOPEROXIDASE AB SER IA-ACNC: <0.2 UNITS (ref 0–0.9)
P-ANCA ATYPICAL TITR SER IF: NORMAL TITER
P-ANCA TITR SER IF: NORMAL TITER
PROTEINASE3 AB SER IA-ACNC: <0.2 UNITS (ref 0–0.9)

## 2023-05-05 RX ORDER — LEVOTHYROXINE SODIUM 25 MCG
TABLET ORAL
Qty: 90 TABLET | Refills: 1 | Status: SHIPPED | OUTPATIENT
Start: 2023-05-05

## 2023-05-05 RX ORDER — TRIAMTERENE AND HYDROCHLOROTHIAZIDE 37.5; 25 MG/1; MG/1
CAPSULE ORAL
Qty: 90 CAPSULE | Refills: 1 | Status: SHIPPED | OUTPATIENT
Start: 2023-05-05

## 2023-05-08 LAB
EXPIRATION DATE: NORMAL
INTERNAL CONTROL: NORMAL
Lab: NORMAL
S PYO AG THROAT QL: NEGATIVE

## 2023-05-28 DIAGNOSIS — K21.9 GASTROESOPHAGEAL REFLUX DISEASE WITHOUT ESOPHAGITIS: ICD-10-CM

## 2023-05-30 ENCOUNTER — TELEPHONE (OUTPATIENT)
Dept: INTERNAL MEDICINE | Facility: CLINIC | Age: 68
End: 2023-05-30

## 2023-05-30 RX ORDER — OMEPRAZOLE 20 MG/1
CAPSULE, DELAYED RELEASE ORAL
Qty: 90 CAPSULE | Refills: 0 | Status: SHIPPED | OUTPATIENT
Start: 2023-05-30

## 2023-05-30 NOTE — TELEPHONE ENCOUNTER
Caller: Iliana Barriga    Relationship: Self    Best call back number: [phone]    Any additional details: PATIENT HAS QUESTIONS ABOUT AN EXISTING REFERRAL TO DERMATOLOGY.

## 2023-06-01 NOTE — TELEPHONE ENCOUNTER
I have left her a voice mail to call me back about dermatology referral  Voice mail ok per  Verbal    HUB-ok to ask her what her questions are about her dermatology referral and send message to me

## 2023-07-26 ENCOUNTER — HOSPITAL ENCOUNTER (OUTPATIENT)
Dept: CT IMAGING | Facility: HOSPITAL | Age: 68
Discharge: HOME | End: 2023-07-26
Admitting: NURSE PRACTITIONER
Payer: MEDICARE

## 2023-07-26 DIAGNOSIS — R31.9 HEMATURIA, UNSPECIFIED TYPE: ICD-10-CM

## 2023-07-26 PROCEDURE — 74178 CT ABD&PLV WO CNTR FLWD CNTR: CPT

## 2023-07-26 PROCEDURE — 25510000001 IOPAMIDOL 61 % SOLUTION: Performed by: NURSE PRACTITIONER

## 2023-07-26 RX ADMIN — IOPAMIDOL 85 ML: 612 INJECTION, SOLUTION INTRAVENOUS at 14:37

## 2023-07-27 ENCOUNTER — OFFICE VISIT (OUTPATIENT)
Dept: UROLOGY | Facility: CLINIC | Age: 68
End: 2023-07-27
Payer: MEDICARE

## 2023-07-27 VITALS — HEIGHT: 63 IN | BODY MASS INDEX: 31.01 KG/M2 | WEIGHT: 175 LBS

## 2023-07-27 DIAGNOSIS — R31.0 GROSS HEMATURIA: Primary | ICD-10-CM

## 2023-07-27 LAB
BILIRUB BLD-MCNC: NEGATIVE MG/DL
CLARITY, POC: CLEAR
COLOR UR: YELLOW
EXPIRATION DATE: NORMAL
GLUCOSE UR STRIP-MCNC: NEGATIVE MG/DL
KETONES UR QL: NEGATIVE
LEUKOCYTE EST, POC: NEGATIVE
Lab: NORMAL
NITRITE UR-MCNC: NEGATIVE MG/ML
PH UR: 6.5 [PH] (ref 5–8)
PROT UR STRIP-MCNC: NEGATIVE MG/DL
RBC # UR STRIP: NEGATIVE /UL
SP GR UR: 1.01 (ref 1–1.03)
UROBILINOGEN UR QL: NORMAL

## 2023-07-27 PROCEDURE — 1159F MED LIST DOCD IN RCRD: CPT | Performed by: NURSE PRACTITIONER

## 2023-07-27 PROCEDURE — 81003 URINALYSIS AUTO W/O SCOPE: CPT | Performed by: NURSE PRACTITIONER

## 2023-07-27 PROCEDURE — 1160F RVW MEDS BY RX/DR IN RCRD: CPT | Performed by: NURSE PRACTITIONER

## 2023-07-27 PROCEDURE — 99214 OFFICE O/P EST MOD 30 MIN: CPT | Performed by: NURSE PRACTITIONER

## 2023-07-27 NOTE — PROGRESS NOTES
Gross Hematuria Female Office Visit      Patient Name: Iliana Barriga  : 1955   MRN: [id number]     Chief Complaint:  Gross Hematuria.   Chief Complaint   Patient presents with    Blood in Urine       Referring Provider: Katheryn Combs APRN    History of Present Illness: Ms. Barriga is a 68 y.o. female with history of gross hematuria. She reports history of gross hematuria in May, 2023. She reports history of uterine cancer s/p total hysterectomy in . She reports history of bladder lift with mesh in  for history of cystocele. She reports history of gross hematuria in May, 2023. She reports she has has intermittent hematuria/debris in urine for the last few years.     Urine Culture   23; no growth.  22; >100,000 E.Coli.   22; no growth  22; 50,000 citrobacter    She denies family history of bladder or kidney cancer. She is a non smoker.     UA today without infection or blood.     She underwent CT Urogram on  ordered by PCP. CT reviewed; no findings or renal mass, bladder mass, nephrolithiasis, ureterolithiasis or hydronephrosis.   Subjective      Review of System: Review of Systems   Gastrointestinal:  Positive for constipation.   Genitourinary:  Positive for hematuria. Negative for dysuria.    I have reviewed the ROS documented by my clinical staff,  I have updated appropriately and I agree. CECILY Hankins    Past Medical History:  Past Medical History:   Diagnosis Date    Acquired hypothyroidism     Cancer     Colon polyp     Diverticulosis     GERD (gastroesophageal reflux disease)     History of uterine cancer     Tx - hysterectomy and LN's; no chemo; no XRT    HL (hearing loss)     Meniere disease     Peptic ulceration        Past Surgical History:  Past Surgical History:   Procedure Laterality Date    ABDOMINOPLASTY  2009    BLADDER SLING MODIFIED, ANTERIOR AND POSTERIOR VAGINAL REPAIR      BREAST BIOPSY Left     ultrasound guide bx  2020    BREAST BIOPSY Left     stereotactic bx 2012    BREAST BIOPSY Right     excisional bx 1989    TOTAL ABDOMINAL HYSTERECTOMY SALPINGO OOPHORECTOMY PELVIC NODE DISSECTION  06/2015    Dr. Neely [facility] - Done for uterine cancer.  Along with bladder suspension       Medications:    Current Outpatient Medications:     acyclovir (ZOVIRAX) 5 % ointment, Apply 1 application topically to the appropriate area as directed 4 (Four) Times a Day As Needed (fever blister)., Disp: 30 g, Rfl: 1    albuterol sulfate  (90 Base) MCG/ACT inhaler, Inhale 2 puffs Every 6 (Six) Hours As Needed for Wheezing or Shortness of Air (cough)., Disp: 18 g, Rfl: 3    ammonium lactate (AmLactin) 12 % lotion, Apply  topically to the appropriate area as directed As Needed for Dry Skin., Disp: 396 g, Rfl: 0    Atrovent HFA 17 MCG/ACT inhaler, Inhale 1 puff 2 (Two) Times a Day., Disp: 1 each, Rfl: 3    azelastine (ASTELIN) 0.1 % nasal spray, 2 sprays into the nostril(s) as directed by provider., Disp: , Rfl:     [employer] Aspirin Low Dose 81 MG EC tablet, TAKE 1 TABLET BY MOUTH EVERY DAY, Disp: 90 tablet, Rfl: 0    CVS D3 1000 units capsule, Take  by mouth Daily., Disp: , Rfl: 1    cyclobenzaprine (FLEXERIL) 5 MG tablet, Take 1 tablet by mouth Every 8 (Eight) Hours., Disp: 30 tablet, Rfl: 0    estrogens, conjugated, (Premarin) 0.625 MG tablet, Take 1 tablet by mouth Daily. Take daily for 21 days then do not take for 7 days., Disp: 90 tablet, Rfl: 1    fluticasone (FLONASE) 50 MCG/ACT nasal spray, 2 sprays into the nostril(s) as directed by provider Daily., Disp: , Rfl:     hydrOXYzine pamoate (Vistaril) 25 MG capsule, 1-2 tablets by mouth as needed for itching, Disp: 30 capsule, Rfl: 0    Magnesium Oxide 500 MG tablet,  0 Refill(s), Disp: , Rfl:     Multiple Vitamins-Minerals (MULTIVITAMIN ADULT EXTRA C PO), Take 1 tablet by mouth Daily., Disp: , Rfl:     omeprazole (priLOSEC) 20 MG capsule, TAKE 1 CAPSULE BY MOUTH EVERY DAY,  Disp: 90 capsule, Rfl: 0    rosuvastatin (CRESTOR) 5 MG tablet, TAKE 1 TABLET BY MOUTH EVERY DAY, Disp: 90 tablet, Rfl: 1    Synthroid 25 MCG tablet, TAKE 1 TABLET BY MOUTH EVERY DAY, Disp: 90 tablet, Rfl: 1    tretinoin (RETIN-A) 0.025 % cream, APPLY 1 APPLICATION TOPICALLY TO THE APPROPRIATE AREA AS DIRECTED EVERY NIGHT., Disp: 45 g, Rfl: 3    triamterene-hydrochlorothiazide (DYAZIDE) 37.5-25 MG per capsule, TAKE 1 CAPSULE BY MOUTH EVERY DAY IN THE MORNING, Disp: 90 capsule, Rfl: 1    Xifaxan 550 MG tablet, Take 1 tablet by mouth 3 (Three) Times a Day., Disp: , Rfl:     Allergies:  Allergies   Allergen Reactions    Sulfamethoxazole-Trimethoprim Itching, Swelling and Hives     Throat felt tight and arm swelled  Throat felt tight and arm swelled      Penicillins Hives    Regadenoson Hives     Rash after stress test.     Technetium Tc 99m Sestamibi Hives       Social History:  Social History     Socioeconomic History    Marital status:    Tobacco Use    Smoking status: Never     Passive exposure: Yes    Smokeless tobacco: Never   Vaping Use    Vaping Use: Never used   Substance and Sexual Activity    Alcohol use: Yes     Alcohol/week: 4.0 - 8.0 standard drinks     Types: 2 - 4 Glasses of wine, 2 - 4 Standard drinks or equivalent per week    Drug use: No    Sexual activity: Not Currently       Family History:  Family History   Problem Relation Age of Onset    Thyroid disease Mother     Arthritis Mother     Thyroid disease Sister     Uterine cancer Sister     Thyroid disease Brother     Heart disease Brother     Breast cancer Maternal Grandmother     Ovarian cancer Neg Hx          Bladder & Bowel Symptom Questionnaire    How often do you usually urinate during the day ?   3 - About every 1-2 hours   2.   How many timed do you urinate at night?   2 - About every 2-3 hours    3.   What is the reason that you usually urinate?   2 - About every 2-3 hours    4.   Once you get the urge to go, how long can you      "comfortably delay?   3 - About every 1-2 hours   5.   How often do you get a sudden urge that makes you rush to the bathroom?   4 - At least once an hour    6.   How often does a sudden urge to urinate result in you leaking urine or wetting pads?   3 - About every 1-2 hours   7.  In your opinion, how good is your bladder control?   0 - No more often than once in 4 hours    8.  Do you have accidental bowel leakage?   yes   9.  Do you have difficulty fully emptying your bladder?   yes   10.  Do you experience accidental leakage when performing some physical activity such as coughing, sneezing, laughing or exercise?   yes   11. Have you tried medications to help improve your symptoms?   no   12. Would you be interested in learning about a long-lasting option that may help you with your symptoms?   no                                                                             Total Score   17     0-7 (Mild) 8-16 (Moderate) 17-28 (Severe)        Objective     Physical Exam:   Vital Signs:   Vitals:    07/27/23 1354   Weight: 79.4 kg (175 lb)   Height: 160 cm (62.99\")   PainSc:   2   PainLoc: Abdomen     Body mass index is 31.01 kg/m².     Physical Exam    Labs:   Brief Urine Lab Results  (Last result in the past 365 days)        Color   Clarity   Blood   Leuk Est   Nitrite   Protein   CREAT   Urine HCG        07/27/23 1357 Yellow   Clear   Negative   Negative   Negative   Negative                   Urine Culture          8/26/2022    17:32 11/9/2022    14:28 6/22/2023    08:47   Urine Culture   Urine Culture No growth  >100,000 CFU/mL Escherichia coli  No growth         Lab Results   Component Value Date    GLUCOSE 96 06/30/2023    CALCIUM 9.2 06/30/2023     06/30/2023    K 4.0 06/30/2023    CO2 24.3 06/30/2023     06/30/2023    BUN 20 06/30/2023    CREATININE 0.63 06/30/2023    EGFRIFAFRI >60 07/13/2021    EGFRIFNONA >60 07/13/2021    BCR 31.7 (H) 06/30/2023    ANIONGAP 12.7 06/30/2023       Lab Results " "  Component Value Date    WBC 6.64 06/30/2023    HGB 13.9 06/30/2023    HCT 39.5 06/30/2023    MCV 89.4 06/30/2023     06/30/2023       Images:   No Images in the past 120 days found..    Measures:   Tobacco:   Iliana Leonela Barriga  reports that she has never smoked. She has been exposed to tobacco smoke. She has never used smokeless tobacco.. .        Urine Incontinence: Patient reports that she is not currently experiencing any symptoms of urinary incontinence.   Assessment / Plan      Assessment/Plan:   Ms. Barriga is a 68 y.o. female who presented today for evaluation of gross hematuria.      The patient was counseled regarding the possible etiologies, relevant work-up and diagnostic approach for gross hematuria, as well as the relevant risk categories as assigned by the American Urological Association guidelines.  I discussed that the definition of microscopic hematuria includes a microscopic urinalysis (not dipstick UA) positive for greater than 3 RBCs per high-powered field under microscopy.  We also discussed that any history of gross hematuria (or visible hematuria) places a patient at higher risk for occult urologic malignancies and necessitates prompt workup.  We discussed the aforementioned risk categories as assigned by the AUA, which are depicted below.  Ultimately, work-up is mandatory for gross or visible hematuria, as indicated by the \"HIGH RISK\" category and recommendations as depicted by the AUA Guidelines.  Given the patient's age, 69 yo smoking history, non smoker, and history of gross hematuria; the patient is deemed HIGH risk, and for these reasons I recommend proceeding with a flexible diagnostic cystoscopy. CT urogram has been completed and reviewed with patient. She is understanding and agreeable with plan of care.              Diagnoses and all orders for this visit:    1. Gross hematuria (Primary)  -     POC Urinalysis Dipstick, Automated         Follow Up:   Return for 1-2 weeks " Cystoscopy Dr. Reyes .    I spent approximately 30 minutes providing clinical care for this patient; including review of patient's chart and provider documentation, face to face time spent with patient in examination room (obtaining history, performing physical exam, discussing diagnosis and management options), placing orders, and completing patient documentation.     CECILY Hankins  [facility] [place]

## 2023-08-10 ENCOUNTER — OFFICE VISIT (OUTPATIENT)
Dept: UROLOGY | Facility: CLINIC | Age: 68
End: 2023-08-10
Payer: MEDICARE

## 2023-08-10 VITALS
BODY MASS INDEX: 31.01 KG/M2 | DIASTOLIC BLOOD PRESSURE: 74 MMHG | HEIGHT: 63 IN | SYSTOLIC BLOOD PRESSURE: 128 MMHG | WEIGHT: 175 LBS | HEART RATE: 87 BPM | OXYGEN SATURATION: 97 %

## 2023-08-10 DIAGNOSIS — R31.0 GROSS HEMATURIA: Primary | ICD-10-CM

## 2023-08-10 LAB
BILIRUB BLD-MCNC: NEGATIVE MG/DL
CLARITY, POC: CLEAR
COLOR UR: YELLOW
EXPIRATION DATE: NORMAL
GLUCOSE UR STRIP-MCNC: NEGATIVE MG/DL
KETONES UR QL: NEGATIVE
LEUKOCYTE EST, POC: NEGATIVE
Lab: NORMAL
NITRITE UR-MCNC: NEGATIVE MG/ML
PH UR: 6 [PH] (ref 5–8)
PROT UR STRIP-MCNC: NEGATIVE MG/DL
RBC # UR STRIP: NEGATIVE /UL
SP GR UR: 1.01 (ref 1–1.03)
UROBILINOGEN UR QL: NORMAL

## 2023-08-10 NOTE — PROGRESS NOTES
LUTS Female Office Visit      Patient Name: Iliana Barriga  : 1955   MRN: [id number]     Chief Complaint:  Lower Urinary Tract Symptoms.   Chief Complaint   Patient presents with    Blood in Urine       Referring Provider: No ref. provider found    History of Present Illness: Mr. Barriga is a 68 y.o. female with history lower urinary tract symptoms.  She is here today for cystoscopy to complete her hematuria work up.  She has had several episodes of gross hematuria.  Most associated with UTI.  She reports history of uterine cancer s/p total hysterectomy in . She reports history of bladder lift with mesh in  for history of cystocele. She reports history of gross hematuria in May, 2023. She reports she has has intermittent hematuria/debris in urine for the last few years.    She takes systemic estrogen and has estrogen cream but has not been using her cream.   She had significant constipation and was started on Linzess for her diverticulitis.  This caused her quite a bit of diarrhea and had e. Coli UTI at that time.  Since her diarrhea has resolved she has not had another UTI.     Preprocedure diagnosis  Gross hematuria    Postprocedure diagnosis  Same    Procedure  Flexible Cystourethroscopy    Attending surgeon  Jasmin Reyes MD    Anesthesia  2% lidocaine jelly intraurethrally    Complications  None    Indications  68 y.o. female undergoing a flexible cystoscopy for the above mentioned indications.      Informed consent was obtained prior to the procedure start.       Findings  Cystoscopy revealed normal bladder mucosa with NO tumors, masses, stones or trabeculations noted .      Procedure  The patient was placed in supine position and prepped and draped in sterile fashion with lidocaine jelly instilled 5 minutes pre-procedure start.  A brief timeout including available nursing staff and awake patient was performed.  The 16 Fr digital flexible cystoscope was lubricated and gently  placed into the urethral meatus. The proximal and distal portions of the urethra appeared well vascularized and normal in appearance. The bladder neck was visualized and appeared well vascularized without mucosal lesions or abnormal appearance. The bladder was then entered and  completely visualized including the trigone. There were bilateral orthotopic ureteral orifices which appeared patent and effluxed clear yellow urine. The posterior wall, lateral walls, anterior wall, and dome were visualized. The cystoscope was then retroflexed and the bladder neck was further visualized and appeared normal.  The scope was gently withdrawn and the procedure terminated.  The patient tolerated the procedure well.             Subjective      Review of System:   Review of Systems   Constitutional:  Negative for chills, fatigue, fever and unexpected weight change.   HENT:  Negative for congestion, rhinorrhea and sore throat.    Eyes:  Negative for visual disturbance.   Respiratory:  Negative for apnea, cough, chest tightness and shortness of breath.    Cardiovascular:  Negative for chest pain.   Gastrointestinal:  Negative for abdominal pain, constipation, diarrhea, nausea and vomiting.   Endocrine: Negative for polydipsia and polyuria.   Genitourinary:  Negative for difficulty urinating, dysuria, enuresis, flank pain, frequency, genital sores, hematuria and urgency.   Musculoskeletal:  Negative for gait problem.   Skin:  Negative for rash and wound.   Allergic/Immunologic: Negative for immunocompromised state.   Neurological:  Negative for dizziness, tremors, syncope, weakness and light-headedness.   Hematological:  Does not bruise/bleed easily.    I have reviewed the ROS documented by my clinical staff, I have updated appropriately and I agree. Jasmin Reyes MD    Past Medical History:  Past Medical History:   Diagnosis Date    Acquired hypothyroidism 1992    Cancer     Colon polyp     Diverticulosis     GERD (gastroesophageal  reflux disease)     History of uterine cancer 2015    Tx - hysterectomy and LN's; no chemo; no XRT    HL (hearing loss)     Meniere disease 1994    Peptic ulceration        Past Surgical History:  Past Surgical History:   Procedure Laterality Date    ABDOMINOPLASTY  2009    BLADDER SLING MODIFIED, ANTERIOR AND POSTERIOR VAGINAL REPAIR      BREAST BIOPSY Left     ultrasound guide bx 2020    BREAST BIOPSY Left     stereotactic bx 2012    BREAST BIOPSY Right     excisional bx 1989    TOTAL ABDOMINAL HYSTERECTOMY SALPINGO OOPHORECTOMY PELVIC NODE DISSECTION  06/2015    Dr. Neely [facility] - Done for uterine cancer.  Along with bladder suspension       Medications:    Current Outpatient Medications:     acyclovir (ZOVIRAX) 5 % ointment, Apply 1 application topically to the appropriate area as directed 4 (Four) Times a Day As Needed (fever blister)., Disp: 30 g, Rfl: 1    albuterol sulfate  (90 Base) MCG/ACT inhaler, Inhale 2 puffs Every 6 (Six) Hours As Needed for Wheezing or Shortness of Air (cough)., Disp: 18 g, Rfl: 3    ammonium lactate (AmLactin) 12 % lotion, Apply  topically to the appropriate area as directed As Needed for Dry Skin., Disp: 396 g, Rfl: 0    Atrovent HFA 17 MCG/ACT inhaler, Inhale 1 puff 2 (Two) Times a Day., Disp: 1 each, Rfl: 3    azelastine (ASTELIN) 0.1 % nasal spray, 2 sprays into the nostril(s) as directed by provider., Disp: , Rfl:     [employer]S Aspirin Low Dose 81 MG EC tablet, TAKE 1 TABLET BY MOUTH EVERY DAY, Disp: 90 tablet, Rfl: 0    CVS D3 1000 units capsule, Take  by mouth Daily., Disp: , Rfl: 1    cyclobenzaprine (FLEXERIL) 5 MG tablet, Take 1 tablet by mouth Every 8 (Eight) Hours., Disp: 30 tablet, Rfl: 0    estrogens, conjugated, (Premarin) 0.625 MG tablet, Take 1 tablet by mouth Daily. Take daily for 21 days then do not take for 7 days., Disp: 90 tablet, Rfl: 1    fluticasone (FLONASE) 50 MCG/ACT nasal spray, 2 sprays into the nostril(s) as directed by provider  Daily., Disp: , Rfl:     hydrOXYzine pamoate (Vistaril) 25 MG capsule, 1-2 tablets by mouth as needed for itching, Disp: 30 capsule, Rfl: 0    Magnesium Oxide 500 MG tablet,  0 Refill(s), Disp: , Rfl:     Multiple Vitamins-Minerals (MULTIVITAMIN ADULT EXTRA C PO), Take 1 tablet by mouth Daily., Disp: , Rfl:     omeprazole (priLOSEC) 20 MG capsule, TAKE 1 CAPSULE BY MOUTH EVERY DAY, Disp: 90 capsule, Rfl: 0    Synthroid 25 MCG tablet, TAKE 1 TABLET BY MOUTH EVERY DAY, Disp: 90 tablet, Rfl: 1    tretinoin (RETIN-A) 0.025 % cream, APPLY 1 APPLICATION TOPICALLY TO THE APPROPRIATE AREA AS DIRECTED EVERY NIGHT., Disp: 45 g, Rfl: 3    triamterene-hydrochlorothiazide (DYAZIDE) 37.5-25 MG per capsule, TAKE 1 CAPSULE BY MOUTH EVERY DAY IN THE MORNING, Disp: 90 capsule, Rfl: 1    Xifaxan 550 MG tablet, Take 1 tablet by mouth 3 (Three) Times a Day., Disp: , Rfl:     rosuvastatin (CRESTOR) 5 MG tablet, TAKE 1 TABLET BY MOUTH EVERY DAY (Patient not taking: Reported on 8/10/2023), Disp: 90 tablet, Rfl: 1    Allergies:  Allergies   Allergen Reactions    Sulfamethoxazole-Trimethoprim Itching, Swelling and Hives     Throat felt tight and arm swelled  Throat felt tight and arm swelled      Penicillins Hives    Regadenoson Hives     Rash after stress test.     Technetium Tc 99m Sestamibi Hives       Social History:  Social History     Socioeconomic History    Marital status:    Tobacco Use    Smoking status: Never     Passive exposure: Yes    Smokeless tobacco: Never   Vaping Use    Vaping Use: Never used   Substance and Sexual Activity    Alcohol use: Yes     Alcohol/week: 4.0 - 8.0 standard drinks     Types: 2 - 4 Glasses of wine, 2 - 4 Standard drinks or equivalent per week    Drug use: No    Sexual activity: Not Currently       Family History:  Family History   Problem Relation Age of Onset    Thyroid disease Mother     Arthritis Mother     Thyroid disease Sister     Uterine cancer Sister     Thyroid disease Brother      "Heart disease Brother     Breast cancer Maternal Grandmother     Ovarian cancer Neg Hx          Objective     Physical Exam:   Vital Signs:   Vitals:    08/10/23 0849   BP: 128/74   Pulse: 87   SpO2: 97%   Weight: 79.4 kg (175 lb)   Height: 160 cm (62.99\")   PainSc: 0-No pain     Body mass index is 31.01 kg/mý.     Physical Exam  Vitals and nursing note reviewed. Exam conducted with a chaperone present.   Constitutional:       General: She is awake. She is not in acute distress.     Appearance: Normal appearance.   HENT:      Head: Normocephalic and atraumatic.      Right Ear: External ear normal.      Left Ear: External ear normal.      Nose: Nose normal.   Eyes:      Conjunctiva/sclera: Conjunctivae normal.   Pulmonary:      Effort: Pulmonary effort is normal. No respiratory distress.   Abdominal:      General: Abdomen is flat. There is no distension.      Palpations: Abdomen is soft. There is no mass.      Tenderness: There is no abdominal tenderness. There is no right CVA tenderness, left CVA tenderness, guarding or rebound.      Hernia: No hernia is present.   Genitourinary:     General: Normal vulva.      Exam position: Lithotomy position.      Comments: Mild anterior prolapse  Skin:     General: Skin is warm.   Neurological:      General: No focal deficit present.      Mental Status: She is alert and oriented to person, place, and time.      Gait: Gait normal.   Psychiatric:         Behavior: Behavior normal. Behavior is cooperative.         Thought Content: Thought content normal.         Judgment: Judgment normal.     Labs:   Brief Urine Lab Results  (Last result in the past 365 days)        Color   Clarity   Blood   Leuk Est   Nitrite   Protein   CREAT   Urine HCG        07/27/23 1357 Yellow   Clear   Negative   Negative   Negative   Negative                   Urine Culture          8/26/2022    17:32 11/9/2022    14:28 6/22/2023    08:47   Urine Culture   Urine Culture No growth  >100,000 CFU/mL " Escherichia coli  No growth         Lab Results   Component Value Date    GLUCOSE 96 06/30/2023    CALCIUM 9.2 06/30/2023     06/30/2023    K 4.0 06/30/2023    CO2 24.3 06/30/2023     06/30/2023    BUN 20 06/30/2023    CREATININE 0.63 06/30/2023    EGFRIFAFRI >60 07/13/2021    EGFRIFNONA >60 07/13/2021    BCR 31.7 (H) 06/30/2023    ANIONGAP 12.7 06/30/2023       Lab Results   Component Value Date    WBC 6.64 06/30/2023    HGB 13.9 06/30/2023    HCT 39.5 06/30/2023    MCV 89.4 06/30/2023     06/30/2023       Images:   CT Abdomen Pelvis With & Without Contrast    Result Date: 7/27/2023  Impression: No nephroureterolithiasis. No suspicious renal or renal mass. Electronically Signed: Ramin Cheng MD  7/27/2023 2:14 PM EDT  Workstation ID: [id number]      Measures:   Tobacco:   Iliana Barriga  reports that she has never smoked. She has been exposed to tobacco smoke. She has never used smokeless tobacco..      Urine Incontinence: Patient reports that she is not currently experiencing any symptoms of urinary incontinence.         Assessment / Plan      Assessment:  Mrs. Barriga is a 68 y.o. female who presented today with gross hematuria and Colette.  It seems for now, her UTI have resolved.  We discussed restarting her estrogen cream 3 times/week.  She is also on systemic estrogen which she has taken for over 30 years.  If she has a recurrence of her Colette she may benefit from suppressive antibiotics.  We will see her back in 3 months.      Diagnoses and all orders for this visit:    1. Gross hematuria (Primary)        Follow Up:   Return in about 3 months (around 11/10/2023) for Recheck.    I spent approximately 30 minutes providing clinical care for this patient; including review of patient's chart and provider documentation, face to face time spent with patient in examination room (obtaining history, performing physical exam, discussing diagnosis and management options), placing orders, and  completing patient documentation.     Jasmin Reyes MD  [facility] [place]

## 2023-08-25 ENCOUNTER — HOSPITAL ENCOUNTER (OUTPATIENT)
Dept: MAMMOGRAPHY | Facility: HOSPITAL | Age: 68
Discharge: HOME | End: 2023-08-25
Payer: MEDICARE

## 2023-08-25 ENCOUNTER — HOSPITAL ENCOUNTER (OUTPATIENT)
Dept: ULTRASOUND IMAGING | Facility: HOSPITAL | Age: 68
Discharge: HOME | End: 2023-08-25
Payer: MEDICARE

## 2023-08-25 DIAGNOSIS — N64.4 BREAST PAIN, LEFT: ICD-10-CM

## 2023-08-25 PROCEDURE — 77066 DX MAMMO INCL CAD BI: CPT

## 2023-08-25 PROCEDURE — 76642 ULTRASOUND BREAST LIMITED: CPT

## 2023-08-25 PROCEDURE — G0279 TOMOSYNTHESIS, MAMMO: HCPCS

## 2023-10-06 DIAGNOSIS — I25.10 CORONARY ARTERY DISEASE INVOLVING NATIVE HEART WITHOUT ANGINA PECTORIS, UNSPECIFIED VESSEL OR LESION TYPE: ICD-10-CM

## 2023-10-08 RX ORDER — ASPIRIN 81 MG/1
TABLET, COATED ORAL
Qty: 90 TABLET | Refills: 0 | Status: SHIPPED | OUTPATIENT
Start: 2023-10-08

## 2023-10-12 RX ORDER — IPRATROPIUM BROMIDE 17 UG/1
1 AEROSOL, METERED RESPIRATORY (INHALATION) 2 TIMES DAILY
Qty: 12.9 EACH | Refills: 3 | Status: SHIPPED | OUTPATIENT
Start: 2023-10-12

## 2023-10-26 DIAGNOSIS — I25.10 CORONARY ARTERY DISEASE INVOLVING NATIVE HEART WITHOUT ANGINA PECTORIS, UNSPECIFIED VESSEL OR LESION TYPE: ICD-10-CM

## 2023-10-26 DIAGNOSIS — K21.9 GASTROESOPHAGEAL REFLUX DISEASE WITHOUT ESOPHAGITIS: ICD-10-CM

## 2023-10-26 RX ORDER — OMEPRAZOLE 20 MG/1
CAPSULE, DELAYED RELEASE ORAL
Qty: 90 CAPSULE | Refills: 0 | Status: SHIPPED | OUTPATIENT
Start: 2023-10-26

## 2023-10-26 RX ORDER — ROSUVASTATIN CALCIUM 5 MG/1
TABLET, COATED ORAL
Qty: 90 TABLET | Refills: 1 | Status: SHIPPED | OUTPATIENT
Start: 2023-10-26

## 2023-11-10 DIAGNOSIS — Z78.0 MENOPAUSE: ICD-10-CM

## 2023-11-13 DIAGNOSIS — H81.02 MENIERE'S DISEASE OF LEFT EAR: ICD-10-CM

## 2023-11-13 DIAGNOSIS — E03.9 HYPOTHYROIDISM, UNSPECIFIED TYPE: ICD-10-CM

## 2023-11-13 DIAGNOSIS — H10.30 ACUTE CONJUNCTIVITIS, UNSPECIFIED ACUTE CONJUNCTIVITIS TYPE, UNSPECIFIED LATERALITY: ICD-10-CM

## 2023-11-14 RX ORDER — CONJUGATED ESTROGENS 0.62 MG/1
0.62 TABLET, FILM COATED ORAL DAILY
Qty: 90 TABLET | Refills: 1 | Status: SHIPPED | OUTPATIENT
Start: 2023-11-14

## 2023-11-14 NOTE — TELEPHONE ENCOUNTER
Next office visit 1/4/2023  Rx for premarin sent to [facility] pharmacy   She states she is needing a refill on some other medications but no sure which ones.  She states she will call the pharmacy and have them send a refill request for other medications.

## 2023-11-15 RX ORDER — TRIAMTERENE AND HYDROCHLOROTHIAZIDE 37.5; 25 MG/1; MG/1
CAPSULE ORAL
Qty: 90 CAPSULE | Refills: 1 | Status: SHIPPED | OUTPATIENT
Start: 2023-11-15

## 2023-11-15 RX ORDER — OFLOXACIN 3 MG/ML
1 SOLUTION/ DROPS OPHTHALMIC 4 TIMES DAILY
Qty: 5 ML | Refills: 0 | OUTPATIENT
Start: 2023-11-15

## 2023-11-15 RX ORDER — LEVOTHYROXINE SODIUM 25 MCG
TABLET ORAL
Qty: 90 TABLET | Refills: 1 | Status: SHIPPED | OUTPATIENT
Start: 2023-11-15

## 2024-01-03 ENCOUNTER — OFFICE VISIT (OUTPATIENT)
Dept: INTERNAL MEDICINE | Facility: CLINIC | Age: 69
End: 2024-01-03
Payer: MEDICARE

## 2024-01-03 VITALS
WEIGHT: 168 LBS | TEMPERATURE: 98 F | BODY MASS INDEX: 29.76 KG/M2 | HEART RATE: 68 BPM | DIASTOLIC BLOOD PRESSURE: 84 MMHG | SYSTOLIC BLOOD PRESSURE: 128 MMHG | RESPIRATION RATE: 16 BRPM

## 2024-01-03 DIAGNOSIS — K59.09 CHRONIC CONSTIPATION: Primary | ICD-10-CM

## 2024-01-03 DIAGNOSIS — R06.2 WHEEZING: ICD-10-CM

## 2024-01-03 DIAGNOSIS — R21 RASH: ICD-10-CM

## 2024-01-03 DIAGNOSIS — K21.9 GASTROESOPHAGEAL REFLUX DISEASE WITHOUT ESOPHAGITIS: ICD-10-CM

## 2024-01-03 PROCEDURE — 1159F MED LIST DOCD IN RCRD: CPT | Performed by: NURSE PRACTITIONER

## 2024-01-03 PROCEDURE — 1160F RVW MEDS BY RX/DR IN RCRD: CPT | Performed by: NURSE PRACTITIONER

## 2024-01-03 PROCEDURE — 99214 OFFICE O/P EST MOD 30 MIN: CPT | Performed by: NURSE PRACTITIONER

## 2024-01-03 RX ORDER — ALBUTEROL SULFATE 90 UG/1
2 AEROSOL, METERED RESPIRATORY (INHALATION) EVERY 6 HOURS PRN
Qty: 18 G | Refills: 3 | Status: SHIPPED | OUTPATIENT
Start: 2024-01-03

## 2024-01-03 RX ORDER — AMMONIUM LACTATE 12 G/100G
LOTION TOPICAL AS NEEDED
Qty: 396 G | Refills: 0 | Status: SHIPPED | OUTPATIENT
Start: 2024-01-03

## 2024-01-03 RX ORDER — RIFAXIMIN 550 MG/1
550 TABLET ORAL 3 TIMES DAILY
Qty: 90 TABLET | Refills: 5 | Status: CANCELLED | OUTPATIENT
Start: 2024-01-03

## 2024-01-03 RX ORDER — OMEPRAZOLE 20 MG/1
20 CAPSULE, DELAYED RELEASE ORAL DAILY
Qty: 90 CAPSULE | Refills: 1 | Status: SHIPPED | OUTPATIENT
Start: 2024-01-03

## 2024-01-03 NOTE — PROGRESS NOTES
Patient Name: Iliana Barriga  : 1955   MRN: [id number]     Chief Complaint:    Chief Complaint   Patient presents with    Follow-up     6 month follow up chronic medical problems       History of Present Illness: Iliana Barriga is a 68 y.o. female presents to clinic today for evaluation of shortness of breath.    The patient reports she has had COVID-19 infection 3 times. She first had COVID-19 infection on 2021 when her  passed away from it. She adds she had COVID-19 infection in 2022 and was evaluated in 2022 for shortness of breath and fatigue concerning for Covid Long Hauler. CT of chest was completed.     CT Chest With Contrast Diagnostic (2023 09:37)    IMPRESSION:     1. Minimal linear atelectasis or scarring within the medial aspects of the bilateral upper lobes and right middle lobe. No other focal airspace consolidation or acute process identified within the chest.      She states she wakes up wheezing.     She went to  on 2023 for evaluation of cough for 6 to 8 weeks. An xray was performed. She was treated with antibiotics. Her granddaughter had RSV. She has never smoked. She has been around smokers her whole life    XR Chest 2 View (2023 11:33)   LINICAL INDICATION:   Cough for greater than 2 months     TECHNIQUE:   XR CHEST 2 VIEWS     COMPARISON:   None.     FINDINGS:   The mediastinal contours and cardiac silhouette are within normal limits. Ill-defined airspace opacities in the right midlung and right lung base. No pleural effusion or pneumothorax.     They recommended a f/u chest xray and 6-8 weeks.     She was prescribed an inhaler in 2022 and has used it from time to time for intermittent shortness of breath and wheezing; is using albuterol 2 to 3 times a week. She uses Atrovent intermittently and confirms it helps the most.      The patient reports GERD; takes prilosec 20 mg as needed.  She does not have any unexplained weight loss  or rectal bleeding. EGD 8/2022; mild Grade A distal esophagitis without stricture or mass lesions.     She as chronic constipation. Last colonoscopy 8/22;  diverticular changes to the sigmoid. No polyps or neoplasia. She has taken xifaxan in the past for constipation (?).         Review of System: Review of Systems     Medications:     Current Outpatient Medications:     acyclovir (ZOVIRAX) 5 % ointment, Apply 1 application topically to the appropriate area as directed 4 (Four) Times a Day As Needed (fever blister)., Disp: 30 g, Rfl: 1    albuterol sulfate  (90 Base) MCG/ACT inhaler, Inhale 2 puffs Every 6 (Six) Hours As Needed for Wheezing or Shortness of Air (cough)., Disp: 18 g, Rfl: 3    ammonium lactate (AmLactin) 12 % lotion, Apply  topically to the appropriate area as directed As Needed for Dry Skin., Disp: 396 g, Rfl: 0    Atrovent HFA 17 MCG/ACT inhaler, INHALE 1 PUFF TWICE A DAY, Disp: 12.9 each, Rfl: 3    azelastine (ASTELIN) 0.1 % nasal spray, 2 sprays into the nostril(s) as directed by provider., Disp: , Rfl:     [employer] D3 1000 units capsule, Take  by mouth Daily., Disp: , Rfl: 1    cyclobenzaprine (FLEXERIL) 5 MG tablet, Take 1 tablet by mouth Every 8 (Eight) Hours. (Patient taking differently: Take 1 tablet by mouth 3 (Three) Times a Day As Needed.), Disp: 30 tablet, Rfl: 0    hydrOXYzine pamoate (Vistaril) 25 MG capsule, 1-2 tablets by mouth as needed for itching, Disp: 30 capsule, Rfl: 0    Magnesium Oxide 500 MG tablet, Take  by mouth Daily., Disp: , Rfl:     Multiple Vitamins-Minerals (MULTIVITAMIN ADULT EXTRA C PO), Take 1 tablet by mouth Daily., Disp: , Rfl:     omeprazole (priLOSEC) 20 MG capsule, Take 1 capsule by mouth Daily., Disp: 90 capsule, Rfl: 1    Premarin 0.625 MG tablet, TAKE 1 TABLET BY MOUTH EVERY DAY, Disp: 90 tablet, Rfl: 1    Synthroid 25 MCG tablet, TAKE 1 TABLET BY MOUTH EVERY DAY, Disp: 90 tablet, Rfl: 1    tretinoin (RETIN-A) 0.025 % cream, Apply 1 application  topically  to the appropriate area as directed Every Night., Disp: 45 g, Rfl: 1    triamterene-hydrochlorothiazide (DYAZIDE) 37.5-25 MG per capsule, TAKE 1 CAPSULE BY MOUTH EVERY DAY IN THE MORNING, Disp: 90 capsule, Rfl: 1    linaclotide (Linzess) 72 MCG capsule capsule, Take 1 capsule by mouth Every Morning Before Breakfast., Disp: 30 capsule, Rfl: 5    rosuvastatin (CRESTOR) 5 MG tablet, TAKE 1 TABLET BY MOUTH EVERY DAY (Patient not taking: Reported on 1/3/2024), Disp: 90 tablet, Rfl: 1    Allergies:   Allergies   Allergen Reactions    Sulfamethoxazole-Trimethoprim Itching, Swelling and Hives     Throat felt tight and arm swelled  Throat felt tight and arm swelled      Penicillins Hives    Regadenoson Hives     Rash after stress test.     Technetium Tc 99m Sestamibi Hives       Objective     Physical Exam:   Vital Signs:   Vitals:    01/03/24 0916   BP: 128/84   BP Location: Right arm   Patient Position: Sitting   Cuff Size: Adult   Pulse: 68   Resp: 16   Temp: 98 °F (36.7 °C)   TempSrc: Infrared   Weight: 76.2 kg (168 lb)           Physical Exam  Constitutional:       General: She is not in acute distress.     Appearance: She is not ill-appearing.   HENT:      Head: Normocephalic.   Cardiovascular:      Rate and Rhythm: Normal rate and regular rhythm.      Heart sounds: Normal heart sounds. No murmur heard.  Pulmonary:      Breath sounds: Normal breath sounds.   Abdominal:      General: Bowel sounds are normal.      Tenderness: There is no abdominal tenderness.   Neurological:      General: No focal deficit present.      Mental Status: She is oriented to person, place, and time.   Psychiatric:         Mood and Affect: Mood normal.       Assessment / Plan      Assessment/Plan:   Diagnoses and all orders for this visit:    1. Chronic constipation (Primary)  -     linaclotide (Linzess) 72 MCG capsule; Take 1 capsule by mouth Every Morning Before Breakfast.  Dispense: 30 capsule; Refill: 5    2. Rash  -     ammonium lactate  (AmLactin) 12 % lotion; Apply  topically to the appropriate area as directed As Needed for Dry Skin.  Dispense: 396 g; Refill: 0  -     tretinoin (RETIN-A) 0.025 % cream; Apply 1 application  topically to the appropriate area as directed Every Night.  Dispense: 45 g; Refill: 1    3. Gastroesophageal reflux disease without esophagitis  -     omeprazole (priLOSEC) 20 MG capsule; Take 1 capsule by mouth Daily.  Dispense: 90 capsule; Refill: 1    4. Wheezing  -     albuterol sulfate  (90 Base) MCG/ACT inhaler; Inhale 2 puffs Every 6 (Six) Hours As Needed for Wheezing or Shortness of Air (cough).  Dispense: 18 g; Refill: 3  -     XR Chest PA & Lateral; Future       General health maintenance  - The patient presents today for an evaluation of her shortness of breath. We reviewed and discussed her imaging results from 2023 in full detail.  - I will repeat chest x-ray. If chest x-ray has not improved, I will proceed with CT of her chest/referral to pulmonary. She will continue albuterol.   -Trial of linzess for constipation.     Follow Up:   Return in about 4 weeks (around 1/31/2024).    CECILY Aguila  [facility]    Transcribed from ambient dictation for CECILY Aguila by Slim Calderón.  01/03/24   12:02 EST    Patient or patient representative verbalized consent to the visit recording.  I have personally performed the services described in this document as transcribed by the above individual, and it is both accurate and complete.

## 2024-01-18 ENCOUNTER — OFFICE VISIT (OUTPATIENT)
Dept: INTERNAL MEDICINE | Facility: CLINIC | Age: 69
End: 2024-01-18
Payer: MEDICARE

## 2024-01-18 VITALS
HEIGHT: 63 IN | DIASTOLIC BLOOD PRESSURE: 96 MMHG | HEART RATE: 84 BPM | SYSTOLIC BLOOD PRESSURE: 122 MMHG | RESPIRATION RATE: 16 BRPM | TEMPERATURE: 97.1 F | WEIGHT: 170.4 LBS | BODY MASS INDEX: 30.19 KG/M2

## 2024-01-18 DIAGNOSIS — M54.12 CERVICAL RADICULAR PAIN: ICD-10-CM

## 2024-01-18 DIAGNOSIS — M54.2 NECK PAIN: Primary | ICD-10-CM

## 2024-01-18 DIAGNOSIS — R31.9 HEMATURIA, UNSPECIFIED TYPE: ICD-10-CM

## 2024-01-18 DIAGNOSIS — N62 LARGE BREASTS: ICD-10-CM

## 2024-01-18 DIAGNOSIS — R53.83 OTHER FATIGUE: ICD-10-CM

## 2024-01-18 DIAGNOSIS — R82.998 LEUKOCYTES IN URINE: ICD-10-CM

## 2024-01-18 DIAGNOSIS — N30.00 ACUTE CYSTITIS WITHOUT HEMATURIA: ICD-10-CM

## 2024-01-18 LAB
AMORPH URATE CRY URNS QL MICRO: ABNORMAL /HPF
BACTERIA UR QL AUTO: ABNORMAL /HPF
BILIRUB BLD-MCNC: NEGATIVE MG/DL
CLARITY, POC: ABNORMAL
COLOR UR: ABNORMAL
EXPIRATION DATE: ABNORMAL
GLUCOSE UR STRIP-MCNC: NEGATIVE MG/DL
HYALINE CASTS UR QL AUTO: ABNORMAL /LPF
KETONES UR QL: NEGATIVE
LEUKOCYTE EST, POC: NEGATIVE
Lab: ABNORMAL
NITRITE UR-MCNC: POSITIVE MG/ML
PH UR: 5 [PH] (ref 5–8)
PROT UR STRIP-MCNC: NEGATIVE MG/DL
RBC # UR STRIP: ABNORMAL /HPF
RBC # UR STRIP: ABNORMAL /UL
REF LAB TEST METHOD: ABNORMAL
SP GR UR: 1.03 (ref 1–1.03)
SQUAMOUS #/AREA URNS HPF: ABNORMAL /HPF
UROBILINOGEN UR QL: NORMAL
WBC # UR STRIP: ABNORMAL /HPF

## 2024-01-18 PROCEDURE — 81015 MICROSCOPIC EXAM OF URINE: CPT | Performed by: NURSE PRACTITIONER

## 2024-01-18 PROCEDURE — 87086 URINE CULTURE/COLONY COUNT: CPT | Performed by: NURSE PRACTITIONER

## 2024-01-18 PROCEDURE — 87077 CULTURE AEROBIC IDENTIFY: CPT | Performed by: NURSE PRACTITIONER

## 2024-01-18 PROCEDURE — 87186 SC STD MICRODIL/AGAR DIL: CPT | Performed by: NURSE PRACTITIONER

## 2024-01-18 RX ORDER — METHYLPREDNISOLONE 4 MG/1
TABLET ORAL
Qty: 21 TABLET | Refills: 0 | Status: SHIPPED | OUTPATIENT
Start: 2024-01-18

## 2024-01-18 RX ORDER — NITROFURANTOIN 25; 75 MG/1; MG/1
100 CAPSULE ORAL 2 TIMES DAILY
Qty: 14 CAPSULE | Refills: 0 | Status: SHIPPED | OUTPATIENT
Start: 2024-01-18

## 2024-01-18 NOTE — PROGRESS NOTES
Patient Name: Iliana Barriga  : 1955   MRN: [id number]     Chief Complaint:    Chief Complaint   Patient presents with   • Neck Pain       History of Present Illness: Iliana Barriga is a 68 y.o. female presents to clinic today with neck pain on the left side of her neck.    The patient has pain with turning her neck to the left, but has been present for a couple of weeks. She has tried Flexeril with some improvement. Associated symptoms are tingling in her lateral hand to her fifth digit that comes and goes. Exacerbations from heavy breasts. She is a size 38 double D. She has an abnormal curvature of her neck. She has pain in her upper back and neck. She has grooves in her shoulders from her bra straps.     She is also complaining of fatigue and requesting a urine specimen. She denies any burning or fevers, but a lot of times she does not know if she has a UTI.    Subjective     Review of System: Review of Systems     Medications:     Current Outpatient Medications:   •  acyclovir (ZOVIRAX) 5 % ointment, Apply 1 application topically to the appropriate area as directed 4 (Four) Times a Day As Needed (fever blister)., Disp: 30 g, Rfl: 1  •  albuterol sulfate  (90 Base) MCG/ACT inhaler, Inhale 2 puffs Every 6 (Six) Hours As Needed for Wheezing or Shortness of Air (cough)., Disp: 18 g, Rfl: 3  •  ammonium lactate (AmLactin) 12 % lotion, Apply  topically to the appropriate area as directed As Needed for Dry Skin., Disp: 396 g, Rfl: 0  •  Atrovent HFA 17 MCG/ACT inhaler, INHALE 1 PUFF TWICE A DAY, Disp: 12.9 each, Rfl: 3  •  azelastine (ASTELIN) 0.1 % nasal spray, 2 sprays into the nostril(s) as directed by provider., Disp: , Rfl:   •  CVS D3 1000 units capsule, Take  by mouth Daily., Disp: , Rfl: 1  •  cyclobenzaprine (FLEXERIL) 5 MG tablet, Take 1 tablet by mouth Every 8 (Eight) Hours. (Patient taking differently: Take 1 tablet by mouth 3 (Three) Times a Day As Needed.), Disp: 30 tablet,  "Rfl: 0  •  hydrOXYzine pamoate (Vistaril) 25 MG capsule, 1-2 tablets by mouth as needed for itching, Disp: 30 capsule, Rfl: 0  •  linaclotide (Linzess) 72 MCG capsule capsule, Take 1 capsule by mouth Every Morning Before Breakfast., Disp: 30 capsule, Rfl: 5  •  Magnesium Oxide 500 MG tablet, Take  by mouth Daily., Disp: , Rfl:   •  Multiple Vitamins-Minerals (MULTIVITAMIN ADULT EXTRA C PO), Take 1 tablet by mouth Daily., Disp: , Rfl:   •  omeprazole (priLOSEC) 20 MG capsule, Take 1 capsule by mouth Daily., Disp: 90 capsule, Rfl: 1  •  Premarin 0.625 MG tablet, TAKE 1 TABLET BY MOUTH EVERY DAY, Disp: 90 tablet, Rfl: 1  •  Synthroid 25 MCG tablet, TAKE 1 TABLET BY MOUTH EVERY DAY, Disp: 90 tablet, Rfl: 1  •  tretinoin (RETIN-A) 0.025 % cream, Apply 1 application  topically to the appropriate area as directed Every Night., Disp: 45 g, Rfl: 1  •  triamterene-hydrochlorothiazide (DYAZIDE) 37.5-25 MG per capsule, TAKE 1 CAPSULE BY MOUTH EVERY DAY IN THE MORNING, Disp: 90 capsule, Rfl: 1  •  methylPREDNISolone (MEDROL) 4 MG dose pack, Take as directed on package instructions., Disp: 21 tablet, Rfl: 0    Allergies:   Allergies   Allergen Reactions   • Sulfamethoxazole-Trimethoprim Itching, Swelling and Hives     Throat felt tight and arm swelled  Throat felt tight and arm swelled     • Penicillins Hives   • Regadenoson Hives     Rash after stress test.    • Technetium Tc 99m Sestamibi Hives       Objective     Physical Exam:   Vital Signs:   Vitals:    01/18/24 1017   BP: 122/96   BP Location: Right arm   Patient Position: Sitting   Cuff Size: Adult   Pulse: 84   Resp: 16   Temp: 97.1 °F (36.2 °C)   TempSrc: Infrared   Weight: 77.3 kg (170 lb 6.4 oz)   Height: 160 cm (63\")   PainSc:   8           Physical Exam  Constitutional:       General: She is not in acute distress.     Appearance: She is not ill-appearing.   HENT:      Head: Normocephalic.   Cardiovascular:      Rate and Rhythm: Normal rate and regular rhythm.      " Heart sounds: Normal heart sounds. No murmur heard.  Pulmonary:      Breath sounds: Normal breath sounds.   Abdominal:      General: Bowel sounds are normal.      Tenderness: There is no abdominal tenderness. There is no right CVA tenderness or left CVA tenderness.   Musculoskeletal:      Comments: The patient has slight kyphosis of upper spine.  Palpation reveals tenderness and tight paraspinal muscles in cervical spine.  The neck has nearly full range of motion however there is pain with range of motion.  Bilateral arms sensation is intact.  Patient with large breasts..  Patient has grooves in her shoulders from her bra straps.   Neurological:      General: No focal deficit present.      Mental Status: She is oriented to person, place, and time.   Psychiatric:         Mood and Affect: Mood normal.         Assessment / Plan      Assessment/Plan:   Diagnoses and all orders for this visit:    1. Neck pain (Primary)  -     XR Spine Cervical 2 or 3 View; Future    2. Cervical radicular pain  -     methylPREDNISolone (MEDROL) 4 MG dose pack; Take as directed on package instructions.  Dispense: 21 tablet; Refill: 0  -     Ambulatory Referral to Physical Therapy Evaluate and treat    3. Large breasts  -     Ambulatory Referral to Plastic Surgery    4. Other fatigue  -     POC Urinalysis Dipstick, Automated    5. Hematuria, unspecified type  -     Urine Culture - Urine, Urine, Clean Catch  -     Urinalysis, Microscopic Only - Urine, Clean Catch    6. Leukocytes in urine  -     Urine Culture - Urine, Urine, Clean Catch  -     Urinalysis, Microscopic Only - Urine, Clean Catch      1. Neck pain.  I am going to check a cervical x-ray today due to radicular pain, a Medrol Dosepak was prescribed, and a referral to physical therapy. Also, a referral to plastic surgery for evaluation for breast reduction. Patient would benefit from breast reduction due to neck and upper back pain. She has grooves in her shoulders and the pain in  the neck and upper back has been present for a long time since she was in her 20s. If the patient does not get a call from the scheduling department, she will call them in 2 weeks. She has an appointment already scheduled for her physical in 02/2025.     2. Urinalysis is to rule out urinary infection.  I will call with results.      Follow Up:   Return for Next scheduled follow up.    CECILY Aguila  [facility]    Transcribed from ambient dictation for CECILY Aguila by Jennie Huynh.  01/18/24   13:21 EST    Patient or patient representative verbalized consent to the visit recording.  I have personally performed the services described in this document as transcribed by the above individual, and it is both accurate and complete.

## 2024-01-19 ENCOUNTER — TELEPHONE (OUTPATIENT)
Dept: INTERNAL MEDICINE | Facility: CLINIC | Age: 69
End: 2024-01-19

## 2024-01-19 NOTE — TELEPHONE ENCOUNTER
Name: PATIENT    Relationship: SELF    HUB PROVIDED THE RELAY MESSAGE FROM THE OFFICE   PATIENT VOICED UNDERSTANDING AND HAS NO FURTHER QUESTIONS AT THIS TIME

## 2024-01-19 NOTE — TELEPHONE ENCOUNTER
----- Message from CECILY Aguila sent at 1/18/2024  4:33 PM EST -----  Please let her know I sent in an antibiotic for a uti. I have also ordered a urine culture. I will let her know the results once I review them. Follow-up if no improvement in symptoms.

## 2024-01-19 NOTE — TELEPHONE ENCOUNTER
I have called and left a message with return phone number      Relay:  Please let her know I sent in an antibiotic for a uti. I have also ordered a urine culture. I will let her know the results once I review them. Follow-up if no improvement in symptoms.

## 2024-01-20 LAB — BACTERIA SPEC AEROBE CULT: ABNORMAL

## 2024-01-22 DIAGNOSIS — I65.22 CALCIFICATION OF LEFT CAROTID ARTERY: Primary | ICD-10-CM

## 2024-01-22 DIAGNOSIS — E78.5 HYPERLIPIDEMIA, UNSPECIFIED HYPERLIPIDEMIA TYPE: ICD-10-CM

## 2024-01-22 RX ORDER — ROSUVASTATIN CALCIUM 10 MG/1
10 TABLET, COATED ORAL DAILY
Qty: 90 TABLET | Refills: 1 | Status: SHIPPED | OUTPATIENT
Start: 2024-01-22

## 2024-02-10 DIAGNOSIS — Z78.0 MENOPAUSE: ICD-10-CM

## 2024-02-12 RX ORDER — CONJUGATED ESTROGENS 0.62 MG/1
0.62 TABLET, FILM COATED ORAL DAILY
Qty: 90 TABLET | Refills: 1 | Status: SHIPPED | OUTPATIENT
Start: 2024-02-12

## 2024-02-27 DIAGNOSIS — M54.2 NECK PAIN: Primary | ICD-10-CM

## 2024-03-13 ENCOUNTER — TREATMENT (OUTPATIENT)
Dept: PHYSICAL THERAPY | Facility: CLINIC | Age: 69
End: 2024-03-13
Payer: MEDICARE

## 2024-03-13 DIAGNOSIS — M54.2 PAIN, NECK: Primary | ICD-10-CM

## 2024-03-13 DIAGNOSIS — M54.9 MID BACK PAIN: ICD-10-CM

## 2024-03-13 PROCEDURE — 97162 PT EVAL MOD COMPLEX 30 MIN: CPT | Performed by: PHYSICAL THERAPIST

## 2024-03-13 PROCEDURE — 97140 MANUAL THERAPY 1/> REGIONS: CPT | Performed by: PHYSICAL THERAPIST

## 2024-03-13 PROCEDURE — 97110 THERAPEUTIC EXERCISES: CPT | Performed by: PHYSICAL THERAPIST

## 2024-03-13 NOTE — PROGRESS NOTES
Physical Therapy Initial Evaluation and Plan of Care  [address]  [place] [address]    Patient: Iliana Barriga   : 1955  Diagnosis/ICD-10 Code:  No primary diagnosis found.  Referring practitioner: CECILY Catalan  Date of Initial Visit: 3/13/2024  Today's Date: 3/13/2024  Patient seen for Visit count could not be calculated. Make sure you are using a visit which is associated with an episode. session         Visit Diagnoses:  No diagnosis found.      Subjective Questionnaire: NDI:46%      Subjective Evaluation    History of Present Illness  Mechanism of injury: Pt is a 68 year old female presenting to the clinic with chronic neck pain. She is unable to wear a bra anymore due to her breasts being too large. She is pursuing breast reduction surgery. She has had neck pain for many years, but recently she has had more pain. The pain is in her neck, both shoulders, and mid back. She feels constant heaviness pulling her forward. She feels like she has knots all along her muscles in her neck and shoulders. She has numbness and tingling on the lateral side of her arm/hand that has been ongoing for weeks. She gets awoken at night due to pain. She is typically up 1-2 hours per night. She has flexiril she can take if her pain is increasing. She takes baby aspirin.       Patient Occupation: Retired Quality of life: excellent    Pain  Current pain ratin  At worst pain ratin  Quality: dull ache  Aggravating factors: prolonged positioning and sleeping  Progression: worsening    Hand dominance: right    Diagnostic Tests  X-ray: abnormal    Patient Goals  Patient goals for therapy: decreased pain, increased motion, increased strength, independence with ADLs/IADLs and return to sport/leisure activities             Objective          Palpation   Left   Tenderness of the cervical paraspinals, levator scapulae, scalenes, sternocleidomastoid, suboccipitals and upper trapezius.      Right Tenderness of the cervical paraspinals, levator scapulae, scalenes, sternocleidomastoid, suboccipitals and upper trapezius.     Active Range of Motion   Cervical/Thoracic Spine   Cervical    Flexion: 42 degrees   Extension: 24 degrees   Left rotation: 55 degrees   Right rotation: 58 degrees     Strength/Myotome Testing     Left Shoulder     Planes of Motion   Flexion: 4-   Abduction: 4-     Right Shoulder     Planes of Motion   Flexion: 4   Abduction: 4   External rotation at 0°: 4+   Internal rotation at 0°: 4+     Right Elbow   Flexion: 4+  Extension: 4+    Tests   Cervical     Left   Positive Spurling's sign.     Right   Positive Spurling's sign.   Negative ULTT1, ULTT3 and ULTT4.     Additional Tests Details  Spurlings caused pain but no radicular symptoms          Assessment & Plan       Assessment  Impairments: abnormal muscle tone, abnormal or restricted ROM, activity intolerance, impaired physical strength, lacks appropriate home exercise program and pain with function   Functional limitations: lifting, sleeping, uncomfortable because of pain and unable to perform repetitive tasks   Assessment details: Patient is a 68-year-old female presenting to the clinic with chronic neck and mid back pain.  She is pursuing breast reduction surgery.  She demonstrates decreased cervical active range of motion, decreased upper extremity strength, and tenderness of the muscles surrounding the cervical and thoracic spine.  Her impairments are limiting her ability to sleep and sit comfortably.  Patient would benefit from skilled PT to address her impairments that she can reach her long-term goals.  Prognosis: good    Goals  Plan Goals: SHORT TERM GOALS:     2 weeks  1. Pt independent with HEP  2. Pt to demonstrate cervical AROM 50-75% of expected norms to allow for improved ability to perform ADL's  3. Pt to report worst pain 2/10 at rest    LONG TERM GOALS:   6 weeks  1. Pt to demonstrate cervical AROM % of  expected norms to allow for improved safety when driving  2. Pt to demonstrate ability to lift 10# OH with bilateral arm(s) without increase in pain in the neck   3. Pt to report being able to work full shift or work in the home without increase in pain in the neck     Plan  Therapy options: will be seen for skilled therapy services  Planned modality interventions: cryotherapy, dry needling, iontophoresis, TENS, high voltage pulsed current (pain management), electrical stimulation/Tanzanian stimulation and ultrasound  Planned therapy interventions: manual therapy, neuromuscular re-education, postural training, soft tissue mobilization, spinal/joint mobilization, strengthening, stretching, therapeutic activities, home exercise program, functional ROM exercises, flexibility, balance/weight-bearing training and body mechanics training  Duration in visits: 1  Duration in weeks: 12  Treatment plan discussed with: patient        History # of Personal Factors and/or Comorbidities: MODERATE (1-2)  Examination of Body System(s): # of elements: MODERATE (3)  Clinical Presentation: STABLE   Clinical Decision Making: MODERATE      Timed:         Manual Therapy:    10     mins  43262;     Therapeutic Exercise:    14     mins  90404;     Neuromuscular Rosa:        mins  53780;    Therapeutic Activity:          mins  20415;     Gait Training:           mins  39489;     Ultrasound:          mins  70063;    Ionto                                   mins   22176  Self Care                            mins   32622  Canalith Repos         mins 92514      Un-Timed:  Electrical Stimulation:         mins  62980 ( );  Dry Needling          mins self-pay  Traction          mins 58168  Low Eval          Mins  99546  Mod Eval     28     Mins  02853  High Eval                            Mins  46032        Timed Treatment:   24   mins   Total Treatment:     52   mins          PT: Emmie Peterson, PT   License Number:  [id number]  Electronically signed by Emmie Peterson, PT, 03/13/24, 9:46 AM EDT    Certification Period: 3/13/2024 thru 6/10/2024  I certify that the therapy services are furnished while this patient is under my care.  The services outlined above are required by this patient, and will be reviewed every 90 days.         Physician Signature:__________________________________________________    PHYSICIAN: Judie Albarran APRN  NPI: [id number]                                      DATE:      Please sign and return via fax to [email or web address] . Thank you, [facility].

## 2024-03-14 ENCOUNTER — OFFICE VISIT (OUTPATIENT)
Dept: INTERNAL MEDICINE | Facility: CLINIC | Age: 69
End: 2024-03-14
Payer: MEDICARE

## 2024-03-14 VITALS
WEIGHT: 174 LBS | SYSTOLIC BLOOD PRESSURE: 112 MMHG | DIASTOLIC BLOOD PRESSURE: 84 MMHG | BODY MASS INDEX: 30.83 KG/M2 | TEMPERATURE: 97.7 F | RESPIRATION RATE: 16 BRPM | HEART RATE: 76 BPM | HEIGHT: 63 IN

## 2024-03-14 DIAGNOSIS — M54.2 NECK PAIN: ICD-10-CM

## 2024-03-14 DIAGNOSIS — N62 LARGE BREASTS: ICD-10-CM

## 2024-03-14 DIAGNOSIS — L03.114 CELLULITIS OF LEFT UPPER EXTREMITY: Primary | ICD-10-CM

## 2024-03-14 PROCEDURE — 1160F RVW MEDS BY RX/DR IN RCRD: CPT | Performed by: NURSE PRACTITIONER

## 2024-03-14 PROCEDURE — 1159F MED LIST DOCD IN RCRD: CPT | Performed by: NURSE PRACTITIONER

## 2024-03-14 PROCEDURE — 99213 OFFICE O/P EST LOW 20 MIN: CPT | Performed by: NURSE PRACTITIONER

## 2024-03-14 RX ORDER — CLINDAMYCIN HYDROCHLORIDE 150 MG/1
450 CAPSULE ORAL 3 TIMES DAILY
COMMUNITY
Start: 2024-03-10 | End: 2024-03-17

## 2024-03-14 NOTE — PROGRESS NOTES
Patient Name: Iliana Barriga  : 1955   MRN: [id number]     Chief Complaint:    Chief Complaint   Patient presents with    Neck Pain     Follow up    Cellulitis     Follow up       History of Present Illness: Iliana Barriga is a 68 y.o. female presents to clinic for follow up.     She was admitted to [facility] on 2024 for left upper extremity cellulitis after a puncture wound with a letter opener to her left thumb. She failed doxycycline outpatient. She was having worsening pain and redness radiating up her arm. She was admitted at .  She was treated with intravenous vancomycin. Hand was consulted. She was placed in a hand splint and discharged on 7 days of clindamycin. She has been afebrile. Her pain is improving. The redness is subsiding. CBC at discharge was within normal limits.    She has seen plastic surgery at [facility] to evaluate for breast reduction secondary to neck and back pain. The pain has been worsening over the last year. She has difficulty sleeping, difficulty turning her head, upper neck and back pain. She has indentations in her shoulders from her bra strap. She has headaches due to neck and shoulder pain. She has DDD in C4-C5 and C5-C6. She reports nerve impingement of the left upper extremity coming from her shoulders and some intermittent numbness or tingling in arms and hands.             Subjective     Review of System: Review of Systems   Musculoskeletal:  Positive for back pain and neck pain.         Pain in left upper extremity (improving).   Skin:  Positive for wound (redness of left upper extremity (improving).).   Psychiatric/Behavioral:  Positive for sleep disturbance (due to neck and back pain).         Medications:     Current Outpatient Medications:     acyclovir (ZOVIRAX) 5 % ointment, Apply 1 application topically to the appropriate area as directed 4 (Four) Times a Day As Needed (fever blister)., Disp: 30 g, Rfl: 1    albuterol sulfate HFA  108 (90 Base) MCG/ACT inhaler, Inhale 2 puffs Every 6 (Six) Hours As Needed for Wheezing or Shortness of Air (cough)., Disp: 18 g, Rfl: 3    ammonium lactate (AmLactin) 12 % lotion, Apply  topically to the appropriate area as directed As Needed for Dry Skin., Disp: 396 g, Rfl: 0    Atrovent HFA 17 MCG/ACT inhaler, INHALE 1 PUFF TWICE A DAY, Disp: 12.9 each, Rfl: 3    azelastine (ASTELIN) 0.1 % nasal spray, 2 sprays into the nostril(s) as directed by provider., Disp: , Rfl:     clindamycin (CLEOCIN) 150 MG capsule, Take 3 capsules by mouth 3 (Three) Times a Day., Disp: , Rfl:     [employer] D3 1000 units capsule, Take  by mouth Daily., Disp: , Rfl: 1    cyclobenzaprine (FLEXERIL) 5 MG tablet, Take 1 tablet by mouth Every 8 (Eight) Hours. (Patient taking differently: Take 1 tablet by mouth 3 (Three) Times a Day As Needed.), Disp: 30 tablet, Rfl: 0    linaclotide (Linzess) 72 MCG capsule capsule, Take 1 capsule by mouth Every Morning Before Breakfast., Disp: 30 capsule, Rfl: 5    Magnesium Oxide 500 MG tablet, Take  by mouth Daily., Disp: , Rfl:     Multiple Vitamins-Minerals (MULTIVITAMIN ADULT EXTRA C PO), Take 1 tablet by mouth Daily., Disp: , Rfl:     omeprazole (priLOSEC) 20 MG capsule, Take 1 capsule by mouth Daily., Disp: 90 capsule, Rfl: 1    Premarin 0.625 MG tablet, TAKE 1 TABLET BY MOUTH EVERY DAY, Disp: 90 tablet, Rfl: 1    Synthroid 25 MCG tablet, TAKE 1 TABLET BY MOUTH EVERY DAY, Disp: 90 tablet, Rfl: 1    tretinoin (RETIN-A) 0.025 % cream, Apply 1 application  topically to the appropriate area as directed Every Night., Disp: 45 g, Rfl: 1    triamterene-hydrochlorothiazide (DYAZIDE) 37.5-25 MG per capsule, TAKE 1 CAPSULE BY MOUTH EVERY DAY IN THE MORNING, Disp: 90 capsule, Rfl: 1    Allergies:   Allergies   Allergen Reactions    Sulfamethoxazole-Trimethoprim Itching, Swelling and Hives     Throat felt tight and arm swelled  Throat felt tight and arm swelled      Penicillins Hives    Regadenoson Hives     Rash  "after stress test.     Technetium Tc 99m Sestamibi Hives       Objective     Physical Exam:   Vital Signs:   Vitals:    03/14/24 1002   BP: 112/84   BP Location: Right arm   Patient Position: Sitting   Cuff Size: Adult   Pulse: 76   Resp: 16   Temp: 97.7 °F (36.5 °C)   TempSrc: Infrared   Weight: 78.9 kg (174 lb)   Height: 160 cm (63\")   PainSc:   1           Physical Exam  Constitutional:       General: She is not in acute distress.     Appearance: She is not ill-appearing.   HENT:      Head: Normocephalic.   Cardiovascular:      Rate and Rhythm: Normal rate and regular rhythm.      Heart sounds: Normal heart sounds. No murmur heard.  Pulmonary:      Breath sounds: Normal breath sounds.   Musculoskeletal:      Comments: Left radial pulses +; brisk cap refill; sensation intact; redness per picture below   Neurological:      General: No focal deficit present.      Mental Status: She is oriented to person, place, and time.   Psychiatric:         Mood and Affect: Mood normal.         Assessment / Plan      Assessment/Plan:   Diagnoses and all orders for this visit:    1. Cellulitis of left upper extremity (Primary)    2. Neck pain    3. Large breasts         1. Left upper extremity cellulitis  She will continue her antibiotics. We discussed red flags and how to follow up for those. If there is any worsening redness, pain, or fever, she will follow up with .  2. Large breasts   Continue to follow-up with [facility] plastic surgery.      Follow Up:   Return in about 4 weeks (around 4/11/2024) for Annual.    CECILY Aguila  [facility]    Transcribed from ambient dictation for CECILY Aguila by Leyda Gutierrez.  03/14/24   11:26 EDT    Patient or patient representative verbalized consent to the visit recording.  I have personally performed the services described in this document as transcribed by the above individual, and it is both accurate and complete.    "

## 2024-03-20 ENCOUNTER — TREATMENT (OUTPATIENT)
Dept: PHYSICAL THERAPY | Facility: CLINIC | Age: 69
End: 2024-03-20
Payer: MEDICARE

## 2024-03-20 DIAGNOSIS — M54.9 MID BACK PAIN: ICD-10-CM

## 2024-03-20 DIAGNOSIS — M54.2 PAIN, NECK: Primary | ICD-10-CM

## 2024-03-20 PROCEDURE — 97140 MANUAL THERAPY 1/> REGIONS: CPT | Performed by: PHYSICAL THERAPIST

## 2024-03-20 PROCEDURE — 97110 THERAPEUTIC EXERCISES: CPT | Performed by: PHYSICAL THERAPIST

## 2024-03-20 NOTE — PROGRESS NOTES
Physical Therapy Daily Progress Note    Subjective   Iliana Nithya reports: she tried to wear a razorback bra which did not go well. The left side of her neck and into her shoulder feels really tight today.      Objective   See Exercise, Manual, and Modality Logs for complete treatment.       Assessment/Plan  Pt tolerated treatment well. She had increased tightness in the left scalenes and cervical paraspinals today. She was educated on the open book stretch to perform instead of the doorway stretch. Pt would benefit from continued skilled PT.     Progress per Plan of Care           Manual Therapy:    24     mins  48656;  Therapeutic Exercise:    14     mins  10601;     Neuromuscular Rosa:        mins  76340;    Therapeutic Activity:          mins  76410;     Gait Training:           mins  71718;     Ultrasound:          mins  80763;    Electrical Stimulation:         mins  83001 ( );  E-Stim Attended:         mins  43643  Iontophoresis          mins 97591   Traction          mins  52307  Fluidotherapy          mins  62064  Dry Needling          mins self-pay - No Charge  Paraffin          mins  82625    Timed Treatment:   38   mins   Total Treatment:     38   mins    Emmie Peterson, PT, DPT  Physical Therapist

## 2024-03-22 ENCOUNTER — TREATMENT (OUTPATIENT)
Dept: PHYSICAL THERAPY | Facility: CLINIC | Age: 69
End: 2024-03-22
Payer: MEDICARE

## 2024-03-22 DIAGNOSIS — M54.9 MID BACK PAIN: ICD-10-CM

## 2024-03-22 DIAGNOSIS — M54.2 PAIN, NECK: Primary | ICD-10-CM

## 2024-03-22 PROCEDURE — 97140 MANUAL THERAPY 1/> REGIONS: CPT | Performed by: PHYSICAL THERAPIST

## 2024-03-22 PROCEDURE — 97110 THERAPEUTIC EXERCISES: CPT | Performed by: PHYSICAL THERAPIST

## 2024-03-23 DIAGNOSIS — E03.9 HYPOTHYROIDISM, UNSPECIFIED TYPE: ICD-10-CM

## 2024-03-23 DIAGNOSIS — H81.02 MENIERE'S DISEASE OF LEFT EAR: ICD-10-CM

## 2024-03-25 RX ORDER — CYCLOBENZAPRINE HCL 5 MG
5 TABLET ORAL EVERY 8 HOURS
Qty: 30 TABLET | Refills: 0 | Status: SHIPPED | OUTPATIENT
Start: 2024-03-25

## 2024-03-25 RX ORDER — LEVOTHYROXINE SODIUM 0.03 MG/1
25 TABLET ORAL DAILY
Qty: 90 TABLET | Refills: 1 | Status: SHIPPED | OUTPATIENT
Start: 2024-03-25

## 2024-03-25 RX ORDER — TRIAMTERENE AND HYDROCHLOROTHIAZIDE 37.5; 25 MG/1; MG/1
CAPSULE ORAL
Qty: 90 CAPSULE | Refills: 1 | Status: SHIPPED | OUTPATIENT
Start: 2024-03-25

## 2024-03-27 ENCOUNTER — TREATMENT (OUTPATIENT)
Dept: PHYSICAL THERAPY | Facility: CLINIC | Age: 69
End: 2024-03-27
Payer: MEDICARE

## 2024-03-27 DIAGNOSIS — M54.2 PAIN, NECK: Primary | ICD-10-CM

## 2024-03-27 DIAGNOSIS — M54.9 MID BACK PAIN: ICD-10-CM

## 2024-03-27 PROCEDURE — 97140 MANUAL THERAPY 1/> REGIONS: CPT | Performed by: PHYSICAL THERAPIST

## 2024-03-27 PROCEDURE — G0283 ELEC STIM OTHER THAN WOUND: HCPCS | Performed by: PHYSICAL THERAPIST

## 2024-03-27 NOTE — PROGRESS NOTES
Physical Therapy Daily Progress Note    Subjective   Iliana Barriga reports: she has been in a lot of pain since her visit last wednesday. It is better today than it has been. She feels like she has a knot on the left side of her neck that won't go away.      Objective   See Exercise, Manual, and Modality Logs for complete treatment.       Assessment/Plan  Pt tolerated treatment well. Treatment focused on manual therapy and e stim to reduce pain and tension in the neck. Discussed that if she is in a lot of pain, she should come back tomorrow for another treatment. Pt would benefit from continued skilled PT.    Progress per Plan of Care           Manual Therapy:    34     mins  68838;  Therapeutic Exercise:         mins  65165;     Neuromuscular Rosa:        mins  55842;    Therapeutic Activity:          mins  80290;     Gait Training:           mins  25188;     Ultrasound:          mins  86411;    Electrical Stimulation:    10     mins  48252 ( );  E-Stim Attended:         mins  79325  Iontophoresis          mins 31572   Traction          mins  28069  Fluidotherapy          mins  62015  Dry Needling          mins self-pay - No Charge  Paraffin         mins  38398    Timed Treatment:   34   mins   Total Treatment:     44   mins    Emmie Peterson, PT, DPT  Physical Therapist

## 2024-03-29 NOTE — PROGRESS NOTES
Physical Therapy Daily Treatment Note    [facility]   [address], [address] [place] [address]      Patient: Iliana Barriga   : 1955  Referring practitioner: CECILY Catalan  Date of Initial Visit: Type: THERAPY  Noted: 3/13/2024  Today's Date: 3/29/2024  Patient seen for 3 sessions    Certification Period 3/29/2024 thru 2024       Visit Diagnoses:    ICD-10-CM ICD-9-CM   1. Pain, neck  M54.2 723.1   2. Mid back pain  M54.9 724.5       Subjective     Pt states that she has had an increase in pain on the left side of the neck since her last treatment. She is coming in today to try to decrease pain and improve her ability to move the head and neck over the weekend.     Objective   See Exercise, Manual, and Modality Logs for complete treatment.       Assessment/Plan     Significant hypertonicity noted in the left cervical paraspinals. Pt demonstrated slight improvement in cervical AROM after manual therapy, but still had some issues with pain. Will cont to progress as indicated.       Timed:         Manual Therapy:    18     mins  21740;     Therapeutic Exercise:    10     mins  25002;     Neuromuscular Rosa:        mins  91465;    Therapeutic Activity:          mins  83939;     Gait Training:           mins  11734;     Ultrasound:          mins  96133;    Ionto                                   mins   48048  Self Care                            mins   62799  Canalith Repos         mins 59616  Electrical Stimulation:         mins  69277    Un-Timed:  Electrical Stimulation:         mins  74718 (MC );  Dry Needling          mins self-pay  Traction          mins 87044      Timed Treatment:   28   mins   Total Treatment:     28   mins    Hong Kingsley, PT, DPT, OCS, Cert. DN  [place] License: [id number]

## 2024-04-10 ENCOUNTER — TREATMENT (OUTPATIENT)
Dept: PHYSICAL THERAPY | Facility: CLINIC | Age: 69
End: 2024-04-10
Payer: MEDICARE

## 2024-04-10 DIAGNOSIS — M54.2 PAIN, NECK: Primary | ICD-10-CM

## 2024-04-10 DIAGNOSIS — M54.9 MID BACK PAIN: ICD-10-CM

## 2024-04-10 PROCEDURE — 97110 THERAPEUTIC EXERCISES: CPT | Performed by: PHYSICAL THERAPIST

## 2024-04-10 PROCEDURE — 97140 MANUAL THERAPY 1/> REGIONS: CPT | Performed by: PHYSICAL THERAPIST

## 2024-04-10 PROCEDURE — 97112 NEUROMUSCULAR REEDUCATION: CPT | Performed by: PHYSICAL THERAPIST

## 2024-04-10 NOTE — PROGRESS NOTES
Physical Therapy Daily Progress Note    Subjective   Iliana Nithya reports: she is doing much better overall since her last visit. She still feels tight and sore, but she does not have the intense knot on the left side of her neck anymore.      Objective   See Exercise, Manual, and Modality Logs for complete treatment.       Assessment/Plan  Pt tolerated treatment well. She was able to tolerate exercises again with min complaints of pain. She responded well to upper thoracic spine PA's. Discussed dry needling next visit. Pt would benefit from continued skilled PT.    Progress per Plan of Care           Manual Therapy:    27     mins  02149;  Therapeutic Exercise:    15     mins  03289;     Neuromuscular Rosa:    11    mins  93306;    Therapeutic Activity:          mins  38140;     Gait Training:           mins  64051;     Ultrasound:          mins  22360;    Electrical Stimulation:         mins  87360 ( );  E-Stim Attended:         mins  35149  Iontophoresis          mins 99477   Traction          mins  44177  Fluidotherapy          mins  73582  Dry Needling          mins self-pay - No Charge  Paraffin          mins  25271    Timed Treatment:   53   mins   Total Treatment:     53   mins    Emmie Peterson, PT, DPT  Physical Therapist

## 2024-04-17 ENCOUNTER — TREATMENT (OUTPATIENT)
Dept: PHYSICAL THERAPY | Facility: CLINIC | Age: 69
End: 2024-04-17

## 2024-04-17 DIAGNOSIS — M54.2 PAIN, NECK: Primary | ICD-10-CM

## 2024-04-17 PROCEDURE — 20561 NDL INSJ W/O NJX 3+ MUSC: CPT | Performed by: PHYSICAL THERAPIST

## 2024-04-17 NOTE — PROGRESS NOTES
Physical Therapy Daily Progress Note    Subjective   Iliana Nithya reports: she would like to try dry needling today. She is tighter on the left side of her neck.      Objective   See Exercise, Manual, and Modality Logs for complete treatment.       Assessment/Plan  Pt tolerated treatment well. Dry needling was performed without adverse effects. Pt would benefit from continued skilled PT.     Progress per Plan of Care             Dry Needling     20     mins self-pay       Emmie Peterson, PT, DPT  Physical Therapist

## 2024-04-19 ENCOUNTER — TELEPHONE (OUTPATIENT)
Dept: PHYSICAL THERAPY | Facility: OTHER | Age: 69
End: 2024-04-19
Payer: MEDICARE

## 2024-04-19 NOTE — TELEPHONE ENCOUNTER
Caller: Iliana Barriga    Relationship: Self    What was the call regarding: BEFORE YOU GO ON MATERNITY LEAVE. I WANTED TO MAKE SURE THAT MY MEDICAL RECORDS SHOWS WHY I WAS BEING SEEN FOR MY NECK PAIN.

## 2024-04-22 ENCOUNTER — OFFICE VISIT (OUTPATIENT)
Dept: INTERNAL MEDICINE | Facility: CLINIC | Age: 69
End: 2024-04-22
Payer: MEDICARE

## 2024-04-22 VITALS
HEART RATE: 72 BPM | RESPIRATION RATE: 16 BRPM | SYSTOLIC BLOOD PRESSURE: 154 MMHG | WEIGHT: 170.4 LBS | HEIGHT: 63 IN | TEMPERATURE: 97.7 F | DIASTOLIC BLOOD PRESSURE: 96 MMHG | BODY MASS INDEX: 30.19 KG/M2

## 2024-04-22 VITALS
DIASTOLIC BLOOD PRESSURE: 96 MMHG | WEIGHT: 170.4 LBS | RESPIRATION RATE: 16 BRPM | SYSTOLIC BLOOD PRESSURE: 154 MMHG | TEMPERATURE: 97.7 F | HEART RATE: 74 BPM | HEIGHT: 63 IN | BODY MASS INDEX: 30.19 KG/M2

## 2024-04-22 DIAGNOSIS — Z78.0 MENOPAUSE: ICD-10-CM

## 2024-04-22 DIAGNOSIS — N62 LARGE BREASTS: ICD-10-CM

## 2024-04-22 DIAGNOSIS — J30.9 ALLERGIC RHINITIS, UNSPECIFIED SEASONALITY, UNSPECIFIED TRIGGER: ICD-10-CM

## 2024-04-22 DIAGNOSIS — Z00.00 ENCOUNTER FOR SUBSEQUENT ANNUAL WELLNESS VISIT (AWV) IN MEDICARE PATIENT: Primary | ICD-10-CM

## 2024-04-22 DIAGNOSIS — Z87.440 HISTORY OF UTI: ICD-10-CM

## 2024-04-22 DIAGNOSIS — Z53.21 PATIENT LEFT WITHOUT BEING SEEN: Primary | ICD-10-CM

## 2024-04-22 DIAGNOSIS — L71.9 ROSACEA: ICD-10-CM

## 2024-04-22 DIAGNOSIS — R21 RASH: ICD-10-CM

## 2024-04-22 DIAGNOSIS — Z23 ENCOUNTER FOR IMMUNIZATION: ICD-10-CM

## 2024-04-22 LAB
BILIRUB BLD-MCNC: NEGATIVE MG/DL
CLARITY, POC: CLEAR
COLOR UR: YELLOW
EXPIRATION DATE: NORMAL
GLUCOSE UR STRIP-MCNC: NEGATIVE MG/DL
KETONES UR QL: NEGATIVE
LEUKOCYTE EST, POC: NEGATIVE
Lab: NORMAL
NITRITE UR-MCNC: NEGATIVE MG/ML
PH UR: 7 [PH] (ref 5–8)
PROT UR STRIP-MCNC: NEGATIVE MG/DL
RBC # UR STRIP: NEGATIVE /UL
SP GR UR: 1.01 (ref 1–1.03)
UROBILINOGEN UR QL: NORMAL

## 2024-04-22 PROCEDURE — G0439 PPPS, SUBSEQ VISIT: HCPCS | Performed by: NURSE PRACTITIONER

## 2024-04-22 PROCEDURE — 1159F MED LIST DOCD IN RCRD: CPT | Performed by: NURSE PRACTITIONER

## 2024-04-22 PROCEDURE — 1170F FXNL STATUS ASSESSED: CPT | Performed by: NURSE PRACTITIONER

## 2024-04-22 PROCEDURE — 81003 URINALYSIS AUTO W/O SCOPE: CPT | Performed by: NURSE PRACTITIONER

## 2024-04-22 PROCEDURE — 1160F RVW MEDS BY RX/DR IN RCRD: CPT | Performed by: NURSE PRACTITIONER

## 2024-04-22 RX ORDER — AZELASTINE 1 MG/ML
2 SPRAY, METERED NASAL 2 TIMES DAILY
Qty: 90 ML | Refills: 3 | Status: SHIPPED | OUTPATIENT
Start: 2024-04-22

## 2024-04-22 RX ORDER — METRONIDAZOLE 7.5 MG/G
GEL TOPICAL 2 TIMES DAILY
Qty: 45 G | Refills: 0 | Status: SHIPPED | OUTPATIENT
Start: 2024-04-22

## 2024-04-22 NOTE — PROGRESS NOTES
The ABCs of the Annual Wellness Visit  Subsequent Medicare Wellness Visit    Chief Complaint   Patient presents with    Medicare Wellness-subsequent      Subjective    History of Present Illness:  Iliana Barriga is a 68 y.o. female who presents for a Subsequent Medicare Wellness Visit.  History of Present Illness  The patient presents for evaluation of multiple medical concerns.    The patient is currently under the care of [facility] for large breasts. Over the past year, she has experienced an escalation in her neck and back pain, which she attributes to her large breasts. Despite recent attempts at physical therapy with dry needling, there has been no improvement. She reports difficulty sleeping, painful range of motion in her head, upper neck, and back pain, and indentions from her shoulders from her bra strap. Accompanying symptoms include headaches, which she attributes to her neck and shoulder pain. She has a degenerative disc at C4-C5 and C5-C6. She also reports nerve impingement in her left upper extremity, originating from her shoulders, accompanied by intermittent numbness and tingling in her hands and arms. She is scheduled for surgery in 06/2024.    The patient reports facial redness and has identified heat triggers. No alleviating factors are reported.    The patient has been evaluated for allergic rhinitis. She has been without her Astelin medication, but her allergies have worsened over the past few weeks. Astelin has been effective in managing her symptoms in the past.      The following portions of the patient's history were reviewed and   updated as appropriate: allergies, current medications, past family history, past medical history, past social history, past surgical history, and problem list.    Compared to one year ago, the patient feels her physical   health is the same.    Compared to one year ago, the patient feels her mental   health is the same.    Recent  Hospitalizations:  She was not admitted within the past 365 days.    Current Medical Providers:  Patient Care Team:  Katheryn Combs APRN as PCP - General (Nurse Practitioner)  Diomedes Parsons MD as Obstetrician (Obstetrics and Gynecology)  Greg Brown MD as Consulting Physician (Colon and Rectal Surgery)  Rainer Howard MD as Consulting Physician (Gastroenterology)    Outpatient Medications Prior to Visit   Medication Sig Dispense Refill    acyclovir (ZOVIRAX) 5 % ointment Apply 1 application topically to the appropriate area as directed 4 (Four) Times a Day As Needed (fever blister). 30 g 1    albuterol sulfate  (90 Base) MCG/ACT inhaler Inhale 2 puffs Every 6 (Six) Hours As Needed for Wheezing or Shortness of Air (cough). 18 g 3    ammonium lactate (AmLactin) 12 % lotion Apply  topically to the appropriate area as directed As Needed for Dry Skin. 396 g 0    Atrovent HFA 17 MCG/ACT inhaler INHALE 1 PUFF TWICE A DAY 12.9 each 3    [facility] D3 1000 units capsule Take  by mouth Daily.  1    cyclobenzaprine (FLEXERIL) 5 MG tablet TAKE 1 TABLET BY MOUTH EVERY 8 HOURS. 30 tablet 0    levothyroxine (SYNTHROID, LEVOTHROID) 25 MCG tablet TAKE 1 TABLET BY MOUTH EVERY DAY 90 tablet 1    linaclotide (Linzess) 72 MCG capsule capsule Take 1 capsule by mouth Every Morning Before Breakfast. 30 capsule 5    Magnesium Oxide 500 MG tablet Take  by mouth Daily.      Multiple Vitamins-Minerals (MULTIVITAMIN ADULT EXTRA C PO) Take 1 tablet by mouth Daily.      omeprazole (priLOSEC) 20 MG capsule Take 1 capsule by mouth Daily. 90 capsule 1    Premarin 0.625 MG tablet TAKE 1 TABLET BY MOUTH EVERY DAY 90 tablet 1    triamterene-hydrochlorothiazide (DYAZIDE) 37.5-25 MG per capsule TAKE 1 CAPSULE BY MOUTH EVERY DAY IN THE MORNING 90 capsule 1    azelastine (ASTELIN) 0.1 % nasal spray 2 sprays into the nostril(s) as directed by provider.      tretinoin (RETIN-A) 0.025 % cream Apply 1 application  topically to the  "appropriate area as directed Every Night. 45 g 1     No facility-administered medications prior to visit.       No opioid medication identified on active medication list. I have reviewed chart for other potential  high risk medication/s and harmful drug interactions in the elderly.        Aspirin is not on active medication list.  Aspirin use is not indicated based on review of current medical condition/s. Risk of harm outweighs potential benefits.  .    Patient Active Problem List   Diagnosis    Annual GYN exam w/o problems    Acquired hypothyroidism    Meniere disease    Cystocele with rectocele     Advance Care Planning  Advance Directive is not on file.  ACP discussion was held with the patient during this visit. Patient has an advance directive (not in EMR), copy requested.          Objective    Vitals:    04/22/24 1111   BP: 154/96   BP Location: Right arm   Patient Position: Sitting   Cuff Size: Adult   Pulse: 72   Resp: 16   Temp: 97.7 °F (36.5 °C)   TempSrc: Infrared   Weight: 77.3 kg (170 lb 6.4 oz)   Height: 158.8 cm (62.5\")   PainSc:   5     Estimated body mass index is 30.67 kg/m² as calculated from the following:    Height as of this encounter: 158.8 cm (62.5\").    Weight as of this encounter: 77.3 kg (170 lb 6.4 oz).           Does the patient have evidence of cognitive impairment? No    Physical Exam  Constitutional:       Appearance: Normal appearance.   Pulmonary:      Effort: Pulmonary effort is normal.   Neurological:      General: No focal deficit present.      Mental Status: She is oriented to person, place, and time.   Psychiatric:         Mood and Affect: Mood normal.   Physical Exam         Results  Imaging  DEXA scan from 2 years ago showed normal density.           HEALTH RISK ASSESSMENT    Smoking Status:  Social History     Tobacco Use   Smoking Status Never    Passive exposure: Yes   Smokeless Tobacco Never     Alcohol Consumption:  Social History     Substance and Sexual Activity "   Alcohol Use Yes    Alcohol/week: 4.0 - 8.0 standard drinks of alcohol    Types: 2 - 4 Glasses of wine, 2 - 4 Standard drinks or equivalent per week     Fall Risk Screen:    LORELEI Fall Risk Assessment was completed, and patient is at LOW risk for falls.Assessment completed on:2024    Depression Screenin/22/2024    11:11 AM   PHQ-2/PHQ-9 Depression Screening   Little Interest or Pleasure in Doing Things 0-->not at all   Feeling Down, Depressed or Hopeless 0-->not at all   PHQ-9: Brief Depression Severity Measure Score 0       Health Habits and Functional and Cognitive Screenin/22/2024    11:09 AM   Functional & Cognitive Status   Do you have difficulty preparing food and eating? No   Do you have difficulty bathing yourself, getting dressed or grooming yourself? No   Do you have difficulty using the toilet? No   Do you have difficulty moving around from place to place? No   Do you have trouble with steps or getting out of a bed or a chair? No   Current Diet Well Balanced Diet   Dental Exam Up to date   Eye Exam Up to date   Exercise (times per week) 7 times per week   Current Exercises Include Walking   Do you need help using the phone?  No   Are you deaf or do you have serious difficulty hearing?  Yes   Do you need help to go to places out of walking distance? No   Do you need help shopping? No   Do you need help preparing meals?  No   Do you need help with housework?  No   Do you need help with laundry? No   Do you need help taking your medications? No   Do you need help managing money? No   Do you ever drive or ride in a car without wearing a seat belt? No   Have you felt unusual stress, anger or loneliness in the last month? No   Who do you live with? Alone   If you need help, do you have trouble finding someone available to you? No   Have you been bothered in the last four weeks by sexual problems? No   Do you have difficulty concentrating, remembering or making decisions? No        Age-appropriate Screening Schedule:  Refer to the list below for future screening recommendations based on patient's age, sex and/or medical conditions. Orders for these recommended tests are listed in the plan section. The patient has been provided with a written plan.    Health Maintenance   Topic Date Due    ZOSTER VACCINE (1 of 2) Never done    HEPATITIS C SCREENING  Never done    ANNUAL WELLNESS VISIT  Never done    PAP SMEAR  07/01/2023    DXA SCAN  01/26/2024    COVID-19 Vaccine (1 - 2023-24 season) 04/09/2025 (Originally 9/1/2023)    LIPID PANEL  06/30/2024    INFLUENZA VACCINE  08/01/2024    BMI FOLLOWUP  02/27/2025    MAMMOGRAM  08/25/2025    COLORECTAL CANCER SCREENING  08/31/2027    TDAP/TD VACCINES (2 - Td or Tdap) 03/08/2034    RSV Vaccine - Adults  Completed    Pneumococcal Vaccine 65+  Completed              Assessment & Plan   CMS Preventative Services Quick Reference  Risk Factors Identified During Encounter  Fall Risk-High or Moderate: Discussed Fall Prevention in the home  Hearing Problem:  established with audiology  Immunizations Discussed/Encouraged: Shingrix  Dental Screening Recommended  Vision Screening Recommended  The above risks/problems have been discussed with the patient.  Follow up actions/plans if indicated are seen below in the Assessment/Plan Section.  Pertinent information has been shared with the patient in the After Visit Summary.    Diagnoses and all orders for this visit:    1. Encounter for subsequent annual wellness visit (AWV) in Medicare patient (Primary)    2. Encounter for immunization  -     Shingrix Vaccine; Future    3. Rash  -     tretinoin (RETIN-A) 0.025 % cream; Apply 1 Application topically to the appropriate area as directed Every Night.  Dispense: 45 g; Refill: 3  -     Ambulatory Referral to Dermatology    4. Rosacea  -     metroNIDAZOLE (METROGEL) 0.75 % gel; Apply  topically to the appropriate area as directed 2 (Two) Times a Day.  Dispense: 45 g;  Refill: 0    5. Menopause  -     DEXA Bone Density Axial; Future    6. Large breasts    7. Allergic rhinitis, unspecified seasonality, unspecified trigger  -     azelastine (ASTELIN) 0.1 % nasal spray; 2 sprays into the nostril(s) as directed by provider 2 (Two) Times a Day.  Dispense: 90 mL; Refill: 3    8. History of UTI  -     POC Urinalysis Dipstick, Automated      Assessment & Plan  1. Potential rosacea.  The patient's facial rash raises concerns for rosacea. A prescription for metronidazole gel has been issued to manage the inflammatory papules. The patient has previously used Retin-A. It is recommended that the patient avoid the use of Retin-A until her dermatology consultation, or use it sparingly. A bone density test has been ordered. We have discussed potential triggers for rosacea, such as heat and sun. Daily sunscreen is recommended, and the reduction of vitamin C and E ointments is also recommended. A referral to dermatology has been made.    2. Allergic rhinitis.  A refill of her Astelin prescription has been provided.    3. Large breasts.  F/u with plastic surgery    4. Medicare wellness  The patient's last DEXA scan in 2023 did not reveal any osteoporosis, with normal density. A repeat DEXA scan has been ordered. The Shingrix vaccine has been sent to the pharmacy.        Follow Up:   Return in about 6 months (around 10/22/2024).     An After Visit Summary and PPPS were made available to the patient.             Health Maintenance for Postmenopausal Women  Menopause is a normal process in which your reproductive ability comes to an end. This process happens gradually over a span of months to years, usually between the ages of 48 and 55. Menopause is complete when you have missed 12 consecutive menstrual periods.  It is important to talk with your health care provider about some of the most common conditions that affect postmenopausal women, such as heart disease, cancer, and bone loss (osteoporosis).  Adopting a healthy lifestyle and getting preventive care can help to promote your health and wellness. Those actions can also lower your chances of developing some of these common conditions.  What should I know about menopause?  During menopause, you may experience a number of symptoms, such as:  Moderate-to-severe hot flashes.  Night sweats.  Decrease in sex drive.  Mood swings.  Headaches.  Tiredness.  Irritability.  Memory problems.  Insomnia.     Choosing to treat or not to treat menopausal changes is an individual decision that you make with your health care provider.  What should I know about hormone replacement therapy and supplements?  Hormone therapy products are effective for treating symptoms that are associated with menopause, such as hot flashes and night sweats. Hormone replacement carries certain risks, especially as you become older. If you are thinking about using estrogen or estrogen with progestin treatments, discuss the benefits and risks with your health care provider.  What should I know about heart disease and stroke?  Heart disease, heart attack, and stroke become more likely as you age. This may be due, in part, to the hormonal changes that your body experiences during menopause. These can affect how your body processes dietary fats, triglycerides, and cholesterol. Heart attack and stroke are both medical emergencies.    There are many things that you can do to help prevent heart disease and stroke:  Have your blood pressure checked at least every 1-2 years. High blood pressure causes heart disease and increases the risk of stroke.  If you are 55-79 years old, ask your health care provider if you should take aspirin to prevent a heart attack or a stroke.  Do not use any tobacco products, including cigarettes, chewing tobacco, or electronic cigarettes. If you need help quitting, ask your health care provider.  It is important to eat a healthy diet and maintain a healthy weight.  Be sure to  include plenty of vegetables, fruits, low-fat dairy products, and lean protein.  Avoid eating foods that are high in solid fats, added sugars, or salt (sodium).  Get regular exercise. This is one of the most important things that you can do for your health.  Try to exercise for at least 150 minutes each week. The type of exercise that you do should increase your heart rate and make you sweat. This is known as moderate-intensity exercise.  Try to do strengthening exercises at least twice each week. Do these in addition to the moderate-intensity exercise.  Know your numbers. Ask your health care provider to check your cholesterol and your blood glucose. Continue to have your blood tested as directed by your health care provider.     What should I know about cancer screening?  There are several types of cancer. Take the following steps to reduce your risk and to catch any cancer development as early as possible.  Breast Cancer  Practice breast self-awareness.  This means understanding how your breasts normally appear and feel.  It also means doing regular breast self-exams. Let your health care provider know about any changes, no matter how small.  If you are 40 or older, have a clinician do a breast exam (clinical breast exam or CBE) every year. Depending on your age, family history, and medical history, it may be recommended that you also have a yearly breast X-ray (mammogram).  If you have a family history of breast cancer, talk with your health care provider about genetic screening.  If you are at high risk for breast cancer, talk with your health care provider about having an MRI and a mammogram every year.  Breast cancer (BRCA) gene test is recommended for women who have family members with BRCA-related cancers. Results of the assessment will determine the need for genetic counseling and BRCA1 and for BRCA2 testing. BRCA-related cancers include these types:  Breast. This occurs in males or  females.  Ovarian.  Tubal. This may also be called fallopian tube cancer.  Cancer of the abdominal or pelvic lining (peritoneal cancer).  Prostate.  Pancreatic.     Cervical, Uterine, and Ovarian Cancer  Your health care provider may recommend that you be screened regularly for cancer of the pelvic organs. These include your ovaries, uterus, and vagina. This screening involves a pelvic exam, which includes checking for microscopic changes to the surface of your cervix (Pap test).  For women ages 21-65, health care providers may recommend a pelvic exam and a Pap test every three years. For women ages 30-65, they may recommend the Pap test and pelvic exam, combined with testing for human papilloma virus (HPV), every five years. Some types of HPV increase your risk of cervical cancer. Testing for HPV may also be done on women of any age who have unclear Pap test results.  Other health care providers may not recommend any screening for nonpregnant women who are considered low risk for pelvic cancer and have no symptoms. Ask your health care provider if a screening pelvic exam is right for you.  If you have had past treatment for cervical cancer or a condition that could lead to cancer, you need Pap tests and screening for cancer for at least 20 years after your treatment. If Pap tests have been discontinued for you, your risk factors (such as having a new sexual partner) need to be reassessed to determine if you should start having screenings again. Some women have medical problems that increase the chance of getting cervical cancer. In these cases, your health care provider may recommend that you have screening and Pap tests more often.  If you have a family history of uterine cancer or ovarian cancer, talk with your health care provider about genetic screening.  If you have vaginal bleeding after reaching menopause, tell your health care provider.  There are currently no reliable tests available to screen for ovarian  cancer.     Lung Cancer  Lung cancer screening is recommended for adults 55-80 years old who are at high risk for lung cancer because of a history of smoking. A yearly low-dose CT scan of the lungs is recommended if you:  Currently smoke.  Have a history of at least 30 pack-years of smoking and you currently smoke or have quit within the past 15 years. A pack-year is smoking an average of one pack of cigarettes per day for one year.     Yearly screening should:  Continue until it has been 15 years since you quit.  Stop if you develop a health problem that would prevent you from having lung cancer treatment.     Colorectal Cancer  This type of cancer can be detected and can often be prevented.  Routine colorectal cancer screening usually begins at age 50 and continues through age 75.  If you have risk factors for colon cancer, your health care provider may recommend that you be screened at an earlier age.  If you have a family history of colorectal cancer, talk with your health care provider about genetic screening.  Your health care provider may also recommend using home test kits to check for hidden blood in your stool.  A small camera at the end of a tube can be used to examine your colon directly (sigmoidoscopy or colonoscopy). This is done to check for the earliest forms of colorectal cancer.  Direct examination of the colon should be repeated every 5-10 years until age 75. However, if early forms of precancerous polyps or small growths are found or if you have a family history or genetic risk for colorectal cancer, you may need to be screened more often.     Skin Cancer  Check your skin from head to toe regularly.  Monitor any moles. Be sure to tell your health care provider:  About any new moles or changes in moles, especially if there is a change in a mole's shape or color.  If you have a mole that is larger than the size of a pencil eraser.  If any of your family members has a history of skin cancer,  especially at a young age, talk with your health care provider about genetic screening.  Always use sunscreen. Apply sunscreen liberally and repeatedly throughout the day.  Whenever you are outside, protect yourself by wearing long sleeves, pants, a wide-brimmed hat, and sunglasses.     What should I know about osteoporosis?  Osteoporosis is a condition in which bone destruction happens more quickly than new bone creation. After menopause, you may be at an increased risk for osteoporosis. To help prevent osteoporosis or the bone fractures that can happen because of osteoporosis, the following is recommended:  If you are 19-50 years old, get at least 1,000 mg of calcium and at least 600 mg of vitamin D per day.  If you are older than age 50 but younger than age 70, get at least 1,200 mg of calcium and at least 600 mg of vitamin D per day.  If you are older than age 70, get at least 1,200 mg of calcium and at least 800 mg of vitamin D per day.     Smoking and excessive alcohol intake increase the risk of osteoporosis. Eat foods that are rich in calcium and vitamin D, and do weight-bearing exercises several times each week as directed by your health care provider.  What should I know about how menopause affects my mental health?  Depression may occur at any age, but it is more common as you become older. Common symptoms of depression include:  Low or sad mood.  Changes in sleep patterns.  Changes in appetite or eating patterns.  Feeling an overall lack of motivation or enjoyment of activities that you previously enjoyed.  Frequent crying spells.     Talk with your health care provider if you think that you are experiencing depression.  What should I know about immunizations?  It is important that you get and maintain your immunizations. These include:  Tetanus, diphtheria, and pertussis (Tdap) booster vaccine.  Influenza every year before the flu season begins.  Pneumonia vaccine.  Shingles vaccine.     Your health  care provider may also recommend other immunizations.  This information is not intended to replace advice given to you by your health care provider. Make sure you discuss any questions you have with your health care provider.  Document Released: 02/09/2007 Document Revised: 07/07/2017 Document Reviewed: 09/20/2016  [employer] Interactive Patient Education © 2018 [employer]

## 2024-05-09 ENCOUNTER — HOSPITAL ENCOUNTER (OUTPATIENT)
Dept: BONE DENSITY | Facility: HOSPITAL | Age: 69
Discharge: HOME | End: 2024-05-09
Admitting: NURSE PRACTITIONER
Payer: MEDICARE

## 2024-05-09 DIAGNOSIS — Z78.0 MENOPAUSE: ICD-10-CM

## 2024-05-09 PROCEDURE — 77080 DXA BONE DENSITY AXIAL: CPT

## 2024-08-08 DIAGNOSIS — E03.9 HYPOTHYROIDISM, UNSPECIFIED TYPE: ICD-10-CM

## 2024-08-08 RX ORDER — LEVOTHYROXINE SODIUM 25 MCG
25 TABLET ORAL DAILY
Qty: 90 TABLET | Refills: 1 | Status: SHIPPED | OUTPATIENT
Start: 2024-08-08

## 2024-09-17 DIAGNOSIS — K21.9 GASTROESOPHAGEAL REFLUX DISEASE WITHOUT ESOPHAGITIS: ICD-10-CM

## 2024-10-03 RX ORDER — CYCLOBENZAPRINE HCL 5 MG
5 TABLET ORAL EVERY 8 HOURS
Qty: 30 TABLET | Refills: 0 | Status: SHIPPED | OUTPATIENT
Start: 2024-10-03

## 2024-10-21 ENCOUNTER — TELEPHONE (OUTPATIENT)
Dept: INTERNAL MEDICINE | Facility: CLINIC | Age: 69
End: 2024-10-21

## 2024-10-21 ENCOUNTER — OFFICE VISIT (OUTPATIENT)
Dept: INTERNAL MEDICINE | Facility: CLINIC | Age: 69
End: 2024-10-21
Payer: MEDICARE

## 2024-10-21 VITALS
DIASTOLIC BLOOD PRESSURE: 78 MMHG | TEMPERATURE: 97.7 F | RESPIRATION RATE: 20 BRPM | OXYGEN SATURATION: 95 % | BODY MASS INDEX: 31.21 KG/M2 | WEIGHT: 173.38 LBS | HEART RATE: 85 BPM | SYSTOLIC BLOOD PRESSURE: 122 MMHG

## 2024-10-21 DIAGNOSIS — I25.10 CORONARY ARTERY DISEASE INVOLVING NATIVE HEART WITHOUT ANGINA PECTORIS, UNSPECIFIED VESSEL OR LESION TYPE: ICD-10-CM

## 2024-10-21 DIAGNOSIS — E03.9 ACQUIRED HYPOTHYROIDISM: Primary | ICD-10-CM

## 2024-10-21 DIAGNOSIS — M25.50 ARTHRALGIA, UNSPECIFIED JOINT: ICD-10-CM

## 2024-10-21 DIAGNOSIS — E55.9 VITAMIN D DEFICIENCY: ICD-10-CM

## 2024-10-21 DIAGNOSIS — M62.838 MUSCLE SPASM: ICD-10-CM

## 2024-10-21 DIAGNOSIS — Z23 ENCOUNTER FOR IMMUNIZATION: ICD-10-CM

## 2024-10-21 DIAGNOSIS — Z23 NEEDS FLU SHOT: ICD-10-CM

## 2024-10-21 PROCEDURE — 1159F MED LIST DOCD IN RCRD: CPT | Performed by: NURSE PRACTITIONER

## 2024-10-21 PROCEDURE — 90656 IIV3 VACC NO PRSV 0.5 ML IM: CPT | Performed by: NURSE PRACTITIONER

## 2024-10-21 PROCEDURE — 1126F AMNT PAIN NOTED NONE PRSNT: CPT | Performed by: NURSE PRACTITIONER

## 2024-10-21 PROCEDURE — G0008 ADMIN INFLUENZA VIRUS VAC: HCPCS | Performed by: NURSE PRACTITIONER

## 2024-10-21 PROCEDURE — 1160F RVW MEDS BY RX/DR IN RCRD: CPT | Performed by: NURSE PRACTITIONER

## 2024-10-21 PROCEDURE — 99214 OFFICE O/P EST MOD 30 MIN: CPT | Performed by: NURSE PRACTITIONER

## 2024-10-21 NOTE — TELEPHONE ENCOUNTER
Patient asking for us to put a note in her chart on her med list that her synthroid needs to be name brand

## 2024-10-21 NOTE — PROGRESS NOTES
Patient Name: Iliana Barriga  : 1955   MRN: [id number]     Chief Complaint:    Chief Complaint   Patient presents with    Follow-up     6 month        History of Present Illness: Iliana Barriga is a 69 y.o. female.  History of Present Illness  The patient presents for evaluation of multiple medical concerns.    Patient is difficulty walking.  But she has days where she feels perfectly fine.  She took Flexeril 5 mg, which did not seem to help, so she increased the dosage to 10 mg, which provided some relief. She is considering applying for a handicap sticker due to her mobility issues.    She mentions that her fingers are starting to bend, which she believes is due to extensive driving.  She has not sought treatment for arthritis and does not take any anti-inflammatories.    She also reports pain and inflammation in her wrist, which she believes is due to carrying heavy items up from her basement. She suspects she may have developed tendinitis. She has started physical therapy for her wrist, which she believes is improving.    Additionally, she reports experiencing shooting pains in her occipital area of her head, which she initially attributed to stress or muscle spasms. However, she is concerned as her father had a tumor in the same area. She describes the area as tender and larger than the other side. She has large breasts and has been approved for a breast reduction; however, this is delayed due to caring for her grand-daughter.     FAMILY HISTORY  She does not have a family history of stroke, but there is a history of Alzheimer's and arthritis.     The 10-year ASCVD risk score (Alana BAIRES, et al., 2019) is: 7%    Values used to calculate the score:      Age: 69 years      Sex: Female      Is Non- : No      Diabetic: No      Tobacco smoker: No      Systolic Blood Pressure: 122 mmHg      Is BP treated: No      HDL Cholesterol: 46 mg/dL      Total Cholesterol: 176 mg/dL      She is currently not on any cholesterol medication and is managing her cholesterol through diet. She experienced a severe reaction to Crestor 10 mg, which left her incapacitated and unable to walk.  Symptoms improved after stopping the medication.  She took medication for 2 weeks. Her CT of chest showed coronary calcifications.     Review of System: Review of Systems     Medications:     Current Outpatient Medications:     acyclovir (ZOVIRAX) 5 % ointment, Apply 1 application topically to the appropriate area as directed 4 (Four) Times a Day As Needed (fever blister)., Disp: 30 g, Rfl: 1    albuterol sulfate  (90 Base) MCG/ACT inhaler, Inhale 2 puffs Every 6 (Six) Hours As Needed for Wheezing or Shortness of Air (cough)., Disp: 18 g, Rfl: 3    ammonium lactate (AmLactin) 12 % lotion, Apply  topically to the appropriate area as directed As Needed for Dry Skin., Disp: 396 g, Rfl: 0    Atrovent HFA 17 MCG/ACT inhaler, INHALE 1 PUFF TWICE A DAY, Disp: 12.9 each, Rfl: 3    azelastine (ASTELIN) 0.1 % nasal spray, 2 sprays into the nostril(s) as directed by provider 2 (Two) Times a Day., Disp: 90 mL, Rfl: 3    [employer]S D3 1000 units capsule, Take  by mouth Daily., Disp: , Rfl: 1    cyclobenzaprine (FLEXERIL) 5 MG tablet, TAKE 1 TABLET BY MOUTH EVERY 8 HOURS., Disp: 30 tablet, Rfl: 0    linaclotide (Linzess) 72 MCG capsule capsule, Take 1 capsule by mouth Every Morning Before Breakfast., Disp: 30 capsule, Rfl: 5    Magnesium Oxide 500 MG tablet, Take  by mouth Daily., Disp: , Rfl:     metroNIDAZOLE (METROGEL) 0.75 % gel, Apply  topically to the appropriate area as directed 2 (Two) Times a Day., Disp: 45 g, Rfl: 0    Multiple Vitamins-Minerals (MULTIVITAMIN ADULT EXTRA C PO), Take 1 tablet by mouth Daily., Disp: , Rfl:     omeprazole (priLOSEC) 20 MG capsule, TAKE 1 CAPSULE BY MOUTH EVERY DAY, Disp: 90 capsule, Rfl: 1    Premarin 0.625 MG tablet, TAKE 1 TABLET BY MOUTH EVERY DAY, Disp: 90 tablet, Rfl: 1    Synthroid 25  MCG tablet, TAKE 1 TABLET BY MOUTH EVERY DAY, Disp: 90 tablet, Rfl: 1    tretinoin (RETIN-A) 0.025 % cream, Apply 1 Application topically to the appropriate area as directed Every Night., Disp: 45 g, Rfl: 3    triamterene-hydrochlorothiazide (DYAZIDE) 37.5-25 MG per capsule, TAKE 1 CAPSULE BY MOUTH EVERY DAY IN THE MORNING, Disp: 90 capsule, Rfl: 1    Allergies:   Allergies   Allergen Reactions    Sulfamethoxazole-Trimethoprim Itching, Swelling and Hives     Throat felt tight and arm swelled  Throat felt tight and arm swelled      Crestor [Rosuvastatin] Unknown - High Severity     Incapacity     Penicillins Hives    Regadenoson Hives     Rash after stress test.     Technetium Tc 99m Sestamibi Hives       Objective     Physical Exam:   Vital Signs:   Vitals:    10/21/24 1022   BP: 122/78   BP Location: Right arm   Patient Position: Sitting   Cuff Size: Adult   Pulse: 85   Resp: 20   Temp: 97.7 °F (36.5 °C)   TempSrc: Temporal   SpO2: 95%   Weight: 78.6 kg (173 lb 6 oz)   PainSc: 0-No pain     Body mass index is 31.21 kg/m².        Physical Exam  Constitutional:       General: She is not in acute distress.     Appearance: She is not ill-appearing.   HENT:      Head: Normocephalic.   Cardiovascular:      Rate and Rhythm: Normal rate and regular rhythm.      Heart sounds: Normal heart sounds. No murmur heard.  Pulmonary:      Breath sounds: Normal breath sounds.   Abdominal:      General: Bowel sounds are normal.      Tenderness: There is no abdominal tenderness.   Musculoskeletal:      Comments: Tight occipital muscles and cervical paraspinal muscles > on left; large breasts   Neurological:      General: No focal deficit present.      Mental Status: She is oriented to person, place, and time.   Psychiatric:         Mood and Affect: Mood normal.   Physical Exam         Results       Assessment / Plan      Assessment/Plan:   Diagnoses and all orders for this visit:    1. Acquired hypothyroidism (Primary)  -     TSH;  Future  -     T3, free; Future    2. Arthralgia, unspecified joint  -     Comprehensive Metabolic Panel; Future  -     CBC w AUTO Differential; Future    3. Coronary artery disease involving native heart without angina pectoris, unspecified vessel or lesion type  -     Lipid Panel; Future  -     Comprehensive Metabolic Panel; Future    4. Vitamin D deficiency  -     Vitamin D 25 hydroxy; Future    5. Muscle spasm    6. Encounter for immunization  -     Cancel: Fluzone High-Dose 65+yrs    7. Needs flu shot  -     Fluzone >6mos       Assessment & Plan  1. Arthritis.  Her hip arthritis, more pronounced on the right side, was diagnosed in 2022. She experiences stiffness and difficulty walking.  Labs will be checked to ensure normal electrolyte levels. She is advised to take anti-inflammatories like Aleve or ibuprofen for a few days during flare-ups and apply moist heat. Kidney and liver function tests will be conducted.   Arthritis is present in her wrist, and she reports ongoing pain and inflammation. She has started physical therapy for her wrist and believes the pain may be due to tendinitis from carrying heavy items. She is encouraged to maintain mobility in her wrist and consider anti-inflammatories if it flares up.    2. Head Pain.  The head pain could be due to muscle spasm. She reports tenderness and swelling in the area, which she attributes to stress. She is advised to massage the area, apply heat or ice, and perform range of motion exercises. Acupuncture has been beneficial for her back and may be considered for this issue as well.    3. Medication Management.  She is currently taking Flexeril 5 mg as needed for muscle stiffness. She took 5 mg three times in one day, which helped alleviate her symptoms.     4. CAD  Consider a trial of repatha since she is statin intolerant.       Follow Up:   Return in about 6 months (around 4/23/2025).  Patient or patient representative verbalized consent for the use of  Ambient Listening during the visit with  CECILY Aguila for chart documentation. 10/21/2024  21:42 EDT    CECILY Aguila  [facility]

## 2024-11-01 DIAGNOSIS — H81.02 MENIERE'S DISEASE OF LEFT EAR: ICD-10-CM

## 2024-11-01 DIAGNOSIS — I10 PRIMARY HYPERTENSION: Primary | ICD-10-CM

## 2024-11-04 DIAGNOSIS — Z78.0 MENOPAUSE: ICD-10-CM

## 2024-11-04 DIAGNOSIS — L71.9 ROSACEA: ICD-10-CM

## 2024-11-04 RX ORDER — TRIAMTERENE AND HYDROCHLOROTHIAZIDE 37.5; 25 MG/1; MG/1
CAPSULE ORAL
Qty: 90 CAPSULE | Refills: 0 | Status: SHIPPED | OUTPATIENT
Start: 2024-11-04

## 2024-11-06 RX ORDER — CONJUGATED ESTROGENS 0.62 MG/1
0.62 TABLET, FILM COATED ORAL DAILY
Qty: 90 TABLET | Refills: 1 | Status: SHIPPED | OUTPATIENT
Start: 2024-11-06

## 2024-11-06 RX ORDER — METRONIDAZOLE 7.5 MG/G
GEL TOPICAL 2 TIMES DAILY
Qty: 45 G | Refills: 0 | Status: SHIPPED | OUTPATIENT
Start: 2024-11-06

## 2024-11-06 NOTE — TELEPHONE ENCOUNTER
Caller: [id number] - [place], [place] - [address] - [phone] [facility] [phone] FX    Relationship: Pharmacy    Best call back number: [phone]         What was the call regarding: PATIENT STATES THAT SHE HAS NOT HAD THIS IN 2 DAYS AND NEEDS AS SOON AS POSSIBLE    Is it okay if the provider responds through MyChart:

## 2024-11-11 ENCOUNTER — TELEPHONE (OUTPATIENT)
Dept: INTERNAL MEDICINE | Facility: CLINIC | Age: 69
End: 2024-11-11
Payer: MEDICARE

## 2024-11-11 NOTE — TELEPHONE ENCOUNTER
----- Message from Katheryn Combs sent at 11/11/2024  3:33 PM EST -----  Additional views of mammogram is recommended. Please let me know if you do not get this scheduled.

## 2024-11-12 ENCOUNTER — TELEPHONE (OUTPATIENT)
Dept: INTERNAL MEDICINE | Facility: CLINIC | Age: 69
End: 2024-11-12
Payer: MEDICARE

## 2024-11-12 NOTE — TELEPHONE ENCOUNTER
[facility] calling regarding additional orders needed due to abnormal mammogram. They are asking for bilateral diagnostic mammogram and bilateral ultra sound, state that report was sent to PCP this morning. Orders can be faxed to [phone].

## 2024-11-13 ENCOUNTER — TELEPHONE (OUTPATIENT)
Dept: INTERNAL MEDICINE | Facility: CLINIC | Age: 69
End: 2024-11-13
Payer: MEDICARE

## 2024-11-13 DIAGNOSIS — R92.8 ABNORMAL MAMMOGRAM OF BOTH BREASTS: Primary | ICD-10-CM

## 2024-11-13 NOTE — TELEPHONE ENCOUNTER
Caller: Iliana Barriga    Relationship: Self    Best call back number: [phone]    What was the call regarding: PATIENT STATED THAT SHE NEEDED TO HAVE A PRESCRIPTION FOR ADDITIONAL TREATMENT FOR A MAMMOGRAM.  PATIENT STATE THAT SHE IS NOT ABLE TO SCHEDULED UNTIL [facility] SEND THIS REQUEST.  SHE STATED THAT  SENT THE REQUEST OVER.

## 2024-11-14 NOTE — TELEPHONE ENCOUNTER
Reached patient at [phone]   Told her orders were placed yesterday and faxed, she stated she is already scheduled

## 2024-11-20 ENCOUNTER — TELEPHONE (OUTPATIENT)
Dept: INTERNAL MEDICINE | Facility: CLINIC | Age: 69
End: 2024-11-20
Payer: MEDICARE

## 2024-11-20 NOTE — TELEPHONE ENCOUNTER
Please call patient and let her know I filled blood pressure medication but please come by and have labs done.

## 2024-11-21 NOTE — TELEPHONE ENCOUNTER
I left a message on the patients voicemail to call our office back, office number provided.     HUB relay:        Please call patient and let her know I filled blood pressure medication but please come by and have labs done.

## 2024-12-04 ENCOUNTER — LAB (OUTPATIENT)
Dept: LAB | Facility: HOSPITAL | Age: 69
End: 2024-12-04
Payer: MEDICARE

## 2024-12-04 DIAGNOSIS — E03.9 ACQUIRED HYPOTHYROIDISM: ICD-10-CM

## 2024-12-04 DIAGNOSIS — E55.9 VITAMIN D DEFICIENCY: ICD-10-CM

## 2024-12-04 DIAGNOSIS — I25.10 CORONARY ARTERY DISEASE INVOLVING NATIVE HEART WITHOUT ANGINA PECTORIS, UNSPECIFIED VESSEL OR LESION TYPE: ICD-10-CM

## 2024-12-04 DIAGNOSIS — M25.50 ARTHRALGIA, UNSPECIFIED JOINT: ICD-10-CM

## 2024-12-04 LAB
25(OH)D3 SERPL-MCNC: 34.8 NG/ML (ref 30–100)
ALBUMIN SERPL-MCNC: 4.4 G/DL (ref 3.5–5.2)
ALBUMIN/GLOB SERPL: 1.3 G/DL
ALP SERPL-CCNC: 62 U/L (ref 39–117)
ALT SERPL W P-5'-P-CCNC: 16 U/L (ref 1–33)
ANION GAP SERPL CALCULATED.3IONS-SCNC: 11.6 MMOL/L (ref 5–15)
AST SERPL-CCNC: 18 U/L (ref 1–32)
BASOPHILS # BLD AUTO: 0.04 10*3/MM3 (ref 0–0.2)
BASOPHILS NFR BLD AUTO: 0.6 % (ref 0–1.5)
BILIRUB SERPL-MCNC: 0.4 MG/DL (ref 0–1.2)
BUN SERPL-MCNC: 21 MG/DL (ref 8–23)
BUN/CREAT SERPL: 27.3 (ref 7–25)
CALCIUM SPEC-SCNC: 10.2 MG/DL (ref 8.6–10.5)
CHLORIDE SERPL-SCNC: 99 MMOL/L (ref 98–107)
CHOLEST SERPL-MCNC: 204 MG/DL (ref 0–200)
CO2 SERPL-SCNC: 25.4 MMOL/L (ref 22–29)
CREAT SERPL-MCNC: 0.77 MG/DL (ref 0.57–1)
DEPRECATED RDW RBC AUTO: 38.9 FL (ref 37–54)
EGFRCR SERPLBLD CKD-EPI 2021: 84.1 ML/MIN/1.73
EOSINOPHIL # BLD AUTO: 0.09 10*3/MM3 (ref 0–0.4)
EOSINOPHIL NFR BLD AUTO: 1.3 % (ref 0.3–6.2)
ERYTHROCYTE [DISTWIDTH] IN BLOOD BY AUTOMATED COUNT: 11.8 % (ref 12.3–15.4)
GLOBULIN UR ELPH-MCNC: 3.5 GM/DL
GLUCOSE SERPL-MCNC: 78 MG/DL (ref 65–99)
HCT VFR BLD AUTO: 46.3 % (ref 34–46.6)
HDLC SERPL-MCNC: 64 MG/DL (ref 40–60)
HGB BLD-MCNC: 15.7 G/DL (ref 12–15.9)
IMM GRANULOCYTES # BLD AUTO: 0.02 10*3/MM3 (ref 0–0.05)
IMM GRANULOCYTES NFR BLD AUTO: 0.3 % (ref 0–0.5)
LDLC SERPL CALC-MCNC: 103 MG/DL (ref 0–100)
LDLC/HDLC SERPL: 1.51 {RATIO}
LYMPHOCYTES # BLD AUTO: 2.88 10*3/MM3 (ref 0.7–3.1)
LYMPHOCYTES NFR BLD AUTO: 40.6 % (ref 19.6–45.3)
MCH RBC QN AUTO: 30.5 PG (ref 26.6–33)
MCHC RBC AUTO-ENTMCNC: 33.9 G/DL (ref 31.5–35.7)
MCV RBC AUTO: 89.9 FL (ref 79–97)
MONOCYTES # BLD AUTO: 0.86 10*3/MM3 (ref 0.1–0.9)
MONOCYTES NFR BLD AUTO: 12.1 % (ref 5–12)
NEUTROPHILS NFR BLD AUTO: 3.2 10*3/MM3 (ref 1.7–7)
NEUTROPHILS NFR BLD AUTO: 45.1 % (ref 42.7–76)
NRBC BLD AUTO-RTO: 0 /100 WBC (ref 0–0.2)
PLATELET # BLD AUTO: 262 10*3/MM3 (ref 140–450)
PMV BLD AUTO: 11.9 FL (ref 6–12)
POTASSIUM SERPL-SCNC: 4.4 MMOL/L (ref 3.5–5.2)
PROT SERPL-MCNC: 7.9 G/DL (ref 6–8.5)
RBC # BLD AUTO: 5.15 10*6/MM3 (ref 3.77–5.28)
SODIUM SERPL-SCNC: 136 MMOL/L (ref 136–145)
T3FREE SERPL-MCNC: 3.4 PG/ML (ref 2–4.4)
TRIGL SERPL-MCNC: 218 MG/DL (ref 0–150)
TSH SERPL DL<=0.05 MIU/L-ACNC: 2.7 UIU/ML (ref 0.27–4.2)
VLDLC SERPL-MCNC: 37 MG/DL (ref 5–40)
WBC NRBC COR # BLD AUTO: 7.09 10*3/MM3 (ref 3.4–10.8)

## 2024-12-04 PROCEDURE — 84443 ASSAY THYROID STIM HORMONE: CPT

## 2024-12-04 PROCEDURE — 80061 LIPID PANEL: CPT

## 2024-12-04 PROCEDURE — 82306 VITAMIN D 25 HYDROXY: CPT

## 2024-12-04 PROCEDURE — 84481 FREE ASSAY (FT-3): CPT

## 2024-12-04 PROCEDURE — 80053 COMPREHEN METABOLIC PANEL: CPT

## 2024-12-04 PROCEDURE — 85025 COMPLETE CBC W/AUTO DIFF WBC: CPT

## 2024-12-18 ENCOUNTER — TELEPHONE (OUTPATIENT)
Dept: INTERNAL MEDICINE | Facility: CLINIC | Age: 69
End: 2024-12-18
Payer: MEDICARE

## 2024-12-18 NOTE — TELEPHONE ENCOUNTER
Tried to reach patient no answer left voicemail to return call    RELAY:  You can change her to telehealth the end of day Thursday if she would like to do that.

## 2024-12-18 NOTE — TELEPHONE ENCOUNTER
----- Message from Katheryn Combs sent at 12/17/2024  8:34 PM EST -----  You can change her to telehealth the end of day Thursday if she would like to do that.

## 2024-12-19 ENCOUNTER — TELEMEDICINE (OUTPATIENT)
Dept: INTERNAL MEDICINE | Facility: CLINIC | Age: 69
End: 2024-12-19
Payer: MEDICARE

## 2024-12-19 ENCOUNTER — TELEPHONE (OUTPATIENT)
Dept: INTERNAL MEDICINE | Facility: CLINIC | Age: 69
End: 2024-12-19

## 2024-12-19 DIAGNOSIS — E78.5 HYPERLIPIDEMIA, UNSPECIFIED HYPERLIPIDEMIA TYPE: Primary | ICD-10-CM

## 2024-12-19 DIAGNOSIS — R25.2 MUSCLE CRAMPS: Primary | ICD-10-CM

## 2024-12-19 DIAGNOSIS — R25.2 MUSCLE CRAMPS: ICD-10-CM

## 2024-12-19 DIAGNOSIS — R06.2 WHEEZING: ICD-10-CM

## 2024-12-19 DIAGNOSIS — I65.22 CALCIFICATION OF LEFT CAROTID ARTERY: ICD-10-CM

## 2024-12-19 PROCEDURE — 1126F AMNT PAIN NOTED NONE PRSNT: CPT | Performed by: NURSE PRACTITIONER

## 2024-12-19 PROCEDURE — 99213 OFFICE O/P EST LOW 20 MIN: CPT | Performed by: NURSE PRACTITIONER

## 2024-12-19 PROCEDURE — 1159F MED LIST DOCD IN RCRD: CPT | Performed by: NURSE PRACTITIONER

## 2024-12-19 PROCEDURE — 1160F RVW MEDS BY RX/DR IN RCRD: CPT | Performed by: NURSE PRACTITIONER

## 2024-12-19 RX ORDER — ALBUTEROL SULFATE 90 UG/1
2 INHALANT RESPIRATORY (INHALATION) EVERY 6 HOURS PRN
Qty: 18 G | Refills: 3 | Status: SHIPPED | OUTPATIENT
Start: 2024-12-19

## 2024-12-19 RX ORDER — CYCLOBENZAPRINE HCL 5 MG
5 TABLET ORAL EVERY 8 HOURS
Qty: 30 TABLET | Refills: 1 | Status: SHIPPED | OUTPATIENT
Start: 2024-12-19

## 2024-12-19 NOTE — PROGRESS NOTES
Patient Name: Iliana Barriga  : 1955   MRN: [id number]     Chief Complaint:    Chief Complaint   Patient presents with    cardiac lab results     Follow up    Left Without Being Seen       History of Present Illness: Iliana Barriga is a 69 y.o. female.  History of Present Illness       Unable to complete visit using a video connection to the patient. A phone visit was used to complete this visits. Total time of discussion was 25 minutes.   The 10-year ASCVD risk score (Alana BAIRES, et al., 2019) is: 9%    Values used to calculate the score:      Age: 69 years      Sex: Female      Is Non- : No      Diabetic: No      Tobacco smoker: No      Systolic Blood Pressure: 122 mmHg      Is BP treated: Yes      HDL Cholesterol: 64 mg/dL      Total Cholesterol: 204 mg/dL   We discussed cardiovascular risk of 9% with known mild coronary artery calcification.  She has not tolerated rosuvastatin due to severe muscle cramps.   Patient would like to focus on diet and exercise and monitor her symptoms.  She requests a refill on cyclobenzaprine which is effective for her muscle cramps at night.  Also requested a refill on  her albuterol.    Subjective     Review of System: Review of Systems     Medications:     Current Outpatient Medications:     acyclovir (ZOVIRAX) 5 % ointment, Apply 1 application topically to the appropriate area as directed 4 (Four) Times a Day As Needed (fever blister)., Disp: 30 g, Rfl: 1    ammonium lactate (AmLactin) 12 % lotion, Apply  topically to the appropriate area as directed As Needed for Dry Skin., Disp: 396 g, Rfl: 0    Atrovent HFA 17 MCG/ACT inhaler, INHALE 1 PUFF TWICE A DAY, Disp: 12.9 each, Rfl: 3    azelastine (ASTELIN) 0.1 % nasal spray, 2 sprays into the nostril(s) as directed by provider 2 (Two) Times a Day., Disp: 90 mL, Rfl: 3    CVS D3 1000 units capsule, Take  by mouth Daily., Disp: , Rfl: 1    linaclotide (Linzess) 72 MCG capsule capsule,  Take 1 capsule by mouth Every Morning Before Breakfast., Disp: 30 capsule, Rfl: 5    Magnesium Oxide 500 MG tablet, Take  by mouth Daily., Disp: , Rfl:     metroNIDAZOLE (METROGEL) 0.75 % gel, APPLY TOPICALLY TO THE APPROPRIATE AREA AS DIRECTED 2 (TWO) TIMES A DAY., Disp: 45 g, Rfl: 0    Multiple Vitamins-Minerals (MULTIVITAMIN ADULT EXTRA C PO), Take 1 tablet by mouth Daily., Disp: , Rfl:     omeprazole (priLOSEC) 20 MG capsule, TAKE 1 CAPSULE BY MOUTH EVERY DAY, Disp: 90 capsule, Rfl: 1    Premarin 0.625 MG tablet, TAKE 1 TABLET BY MOUTH EVERY DAY, Disp: 90 tablet, Rfl: 1    Synthroid 25 MCG tablet, TAKE 1 TABLET BY MOUTH EVERY DAY, Disp: 90 tablet, Rfl: 1    tretinoin (RETIN-A) 0.025 % cream, Apply 1 Application topically to the appropriate area as directed Every Night., Disp: 45 g, Rfl: 3    triamterene-hydrochlorothiazide (DYAZIDE) 37.5-25 MG per capsule, TAKE 1 CAPSULE BY MOUTH EVERY DAY IN THE MORNING, Disp: 90 capsule, Rfl: 0    albuterol sulfate  (90 Base) MCG/ACT inhaler, Inhale 2 puffs Every 6 (Six) Hours As Needed for Wheezing or Shortness of Air (cough)., Disp: 18 g, Rfl: 3    cyclobenzaprine (FLEXERIL) 5 MG tablet, Take 1 tablet by mouth Every 8 (Eight) Hours., Disp: 30 tablet, Rfl: 1    Allergies:   Allergies   Allergen Reactions    Sulfamethoxazole-Trimethoprim Itching, Swelling and Hives     Throat felt tight and arm swelled  Throat felt tight and arm swelled      Crestor [Rosuvastatin] Unknown - High Severity     Incapacity     Penicillins Hives    Regadenoson Hives     Rash after stress test.     Technetium Tc 99m Sestamibi Hives       Objective     Physical Exam:   Vital Signs:   Vitals:    12/19/24 1411   PainSc: 0-No pain     There is no height or weight on file to calculate BMI.        Physical Exam  Physical Exam         Results       Assessment / Plan      Assessment/Plan:   Diagnoses and all orders for this visit:    1. Hyperlipidemia, unspecified hyperlipidemia type (Primary)    2.  Calcification of left carotid artery    3. Muscle cramps    4. Wheezing       Assessment & Plan        Patient would like to focus on diet and exercise and monitor her symptoms.      She requests a refill on cyclobenzaprine which is effective for her muscle cramps at night.  We discussed options including another trial of statin, Zetia or Repatha.  Also refilled her albuterol.  Patient verbalized understanding agreement plan of care; she will reach out if any future concerns or questions.     Follow Up:   Next scheduled follow-up    CECILY Aguila  [facility]

## 2024-12-19 NOTE — TELEPHONE ENCOUNTER
Patient was unable to connect for video visit.  We discussed cardiovascular risk of 9% with known mild coronary artery calcification.  She has not tolerated rosuvastatin due to severe muscle cramps.  We discussed options including another trial of statin, Zetia or Repatha.  Patient would like to focus on diet and exercise and monitor her symptoms.  She requests a refill on cyclobenzaprine which is effective for her muscle cramps at night.  Also refilled her albuterol.  Patient verbalized understanding agreement plan of care; she will reach out if any future concerns or questions.

## 2024-12-19 NOTE — PROGRESS NOTES
Patient Name: Iliana Barriga  : 1955   MRN: [id number]     Chief Complaint:    Chief Complaint   Patient presents with    cardiac lab results     Follow up       History of Present Illness: Iliana Barriga is a 69 y.o. female.  History of Present Illness       You have chosen to receive care through a telehealth visit. Patient consents to video/audio connection for your medical care today.   This visit completed via [employer].   Patient is home and provider is at [facility] [place] [address].    The 10-year ASCVD risk score (Alana BAIRES, et al., 2019) is: 9%    Values used to calculate the score:      Age: 69 years      Sex: Female      Is Non- : No      Diabetic: No      Tobacco smoker: No      Systolic Blood Pressure: 122 mmHg      Is BP treated: Yes      HDL Cholesterol: 64 mg/dL      Total Cholesterol: 204 mg/dL   Subjective     Review of System: Review of Systems     Medications:     Current Outpatient Medications:     acyclovir (ZOVIRAX) 5 % ointment, Apply 1 application topically to the appropriate area as directed 4 (Four) Times a Day As Needed (fever blister)., Disp: 30 g, Rfl: 1    albuterol sulfate  (90 Base) MCG/ACT inhaler, Inhale 2 puffs Every 6 (Six) Hours As Needed for Wheezing or Shortness of Air (cough)., Disp: 18 g, Rfl: 3    ammonium lactate (AmLactin) 12 % lotion, Apply  topically to the appropriate area as directed As Needed for Dry Skin., Disp: 396 g, Rfl: 0    Atrovent HFA 17 MCG/ACT inhaler, INHALE 1 PUFF TWICE A DAY, Disp: 12.9 each, Rfl: 3    azelastine (ASTELIN) 0.1 % nasal spray, 2 sprays into the nostril(s) as directed by provider 2 (Two) Times a Day., Disp: 90 mL, Rfl: 3    CVS D3 1000 units capsule, Take  by mouth Daily., Disp: , Rfl: 1    cyclobenzaprine (FLEXERIL) 5 MG tablet, TAKE 1 TABLET BY MOUTH EVERY 8 HOURS., Disp: 30 tablet, Rfl: 0    linaclotide (Linzess) 72 MCG  capsule capsule, Take 1 capsule by mouth Every Morning Before Breakfast., Disp: 30 capsule, Rfl: 5    Magnesium Oxide 500 MG tablet, Take  by mouth Daily., Disp: , Rfl:     metroNIDAZOLE (METROGEL) 0.75 % gel, APPLY TOPICALLY TO THE APPROPRIATE AREA AS DIRECTED 2 (TWO) TIMES A DAY., Disp: 45 g, Rfl: 0    Multiple Vitamins-Minerals (MULTIVITAMIN ADULT EXTRA C PO), Take 1 tablet by mouth Daily., Disp: , Rfl:     omeprazole (priLOSEC) 20 MG capsule, TAKE 1 CAPSULE BY MOUTH EVERY DAY, Disp: 90 capsule, Rfl: 1    Premarin 0.625 MG tablet, TAKE 1 TABLET BY MOUTH EVERY DAY, Disp: 90 tablet, Rfl: 1    Synthroid 25 MCG tablet, TAKE 1 TABLET BY MOUTH EVERY DAY, Disp: 90 tablet, Rfl: 1    tretinoin (RETIN-A) 0.025 % cream, Apply 1 Application topically to the appropriate area as directed Every Night., Disp: 45 g, Rfl: 3    triamterene-hydrochlorothiazide (DYAZIDE) 37.5-25 MG per capsule, TAKE 1 CAPSULE BY MOUTH EVERY DAY IN THE MORNING, Disp: 90 capsule, Rfl: 0    Allergies:   Allergies   Allergen Reactions    Sulfamethoxazole-Trimethoprim Itching, Swelling and Hives     Throat felt tight and arm swelled  Throat felt tight and arm swelled      Crestor [Rosuvastatin] Unknown - High Severity     Incapacity     Penicillins Hives    Regadenoson Hives     Rash after stress test.     Technetium Tc 99m Sestamibi Hives       Objective     Physical Exam:   Vital Signs:   Vitals:    12/19/24 1411   PainSc: 0-No pain     There is no height or weight on file to calculate BMI.        Physical Exam  Physical Exam         Results       Assessment / Plan      Assessment/Plan:   There are no diagnoses linked to this encounter.   Assessment & Plan           Follow Up:   No follow-ups on file.  {SASAH [employer] Provider Statement:33983}    CECILY Aguila  [facility]

## 2024-12-26 ENCOUNTER — TELEPHONE (OUTPATIENT)
Dept: INTERNAL MEDICINE | Facility: CLINIC | Age: 69
End: 2024-12-26
Payer: MEDICARE

## 2024-12-26 NOTE — TELEPHONE ENCOUNTER
Tried to reach patient no answer left voicemail to return call    RELAY:  Katheryn Combs APRN [facility] [place]     I am pleased to report your mammogram did not show any suspicious findings for breast cancer (benign findings present).  I recommend annual mammograms and follow-up if any new concerns or changes.

## 2024-12-26 NOTE — TELEPHONE ENCOUNTER
----- Message from Katheryn Combs sent at 12/24/2024 12:18 PM EST -----     I am pleased to report your mammogram did not show any suspicious findings for breast cancer (benign findings present).  I recommend annual mammograms and follow-up if any new concerns or changes.

## 2024-12-26 NOTE — TELEPHONE ENCOUNTER
Name: Iliana Barriga      Relationship: Self      Best Callback Number: [phone]       HUB PROVIDED THE RELAY MESSAGE FROM THE OFFICE      PATIENT: VOICED UNDERSTANDING AND HAS NO FURTHER QUESTIONS AT THIS TIME    ADDITIONAL INFORMATION:

## 2025-01-22 DIAGNOSIS — I10 PRIMARY HYPERTENSION: ICD-10-CM

## 2025-01-22 DIAGNOSIS — L71.9 ROSACEA: ICD-10-CM

## 2025-01-22 DIAGNOSIS — R21 RASH: ICD-10-CM

## 2025-01-22 DIAGNOSIS — E03.9 HYPOTHYROIDISM, UNSPECIFIED TYPE: ICD-10-CM

## 2025-01-23 RX ORDER — METRONIDAZOLE 7.5 MG/G
GEL TOPICAL 2 TIMES DAILY
Qty: 45 G | Refills: 0 | Status: SHIPPED | OUTPATIENT
Start: 2025-01-23

## 2025-01-23 RX ORDER — LEVOTHYROXINE SODIUM 25 MCG
25 TABLET ORAL DAILY
Qty: 87 TABLET | Refills: 1 | Status: SHIPPED | OUTPATIENT
Start: 2025-01-23

## 2025-01-23 RX ORDER — TRETINOIN 0.25 MG/G
1 CREAM TOPICAL NIGHTLY
Qty: 45 G | Refills: 3 | Status: SHIPPED | OUTPATIENT
Start: 2025-01-23

## 2025-01-23 RX ORDER — TRIAMTERENE AND HYDROCHLOROTHIAZIDE 37.5; 25 MG/1; MG/1
CAPSULE ORAL
Qty: 90 CAPSULE | Refills: 0 | Status: SHIPPED | OUTPATIENT
Start: 2025-01-23

## 2025-02-06 DIAGNOSIS — Z78.0 MENOPAUSE: ICD-10-CM

## 2025-02-06 RX ORDER — CONJUGATED ESTROGENS 0.62 MG/1
0.62 TABLET, FILM COATED ORAL DAILY
Qty: 90 TABLET | Refills: 1 | Status: SHIPPED | OUTPATIENT
Start: 2025-02-06

## 2025-03-12 ENCOUNTER — OFFICE VISIT (OUTPATIENT)
Dept: INTERNAL MEDICINE | Facility: CLINIC | Age: 70
End: 2025-03-12
Payer: MEDICARE

## 2025-03-12 VITALS
SYSTOLIC BLOOD PRESSURE: 118 MMHG | WEIGHT: 173 LBS | HEIGHT: 63 IN | TEMPERATURE: 97.1 F | HEART RATE: 68 BPM | RESPIRATION RATE: 18 BRPM | BODY MASS INDEX: 30.65 KG/M2 | DIASTOLIC BLOOD PRESSURE: 66 MMHG

## 2025-03-12 DIAGNOSIS — R21 RASH: Primary | ICD-10-CM

## 2025-03-12 PROCEDURE — 1126F AMNT PAIN NOTED NONE PRSNT: CPT | Performed by: NURSE PRACTITIONER

## 2025-03-12 PROCEDURE — 99213 OFFICE O/P EST LOW 20 MIN: CPT | Performed by: NURSE PRACTITIONER

## 2025-03-12 RX ORDER — PERMETHRIN 50 MG/G
1 CREAM TOPICAL WEEKLY
Qty: 60 G | Refills: 1 | Status: SHIPPED | OUTPATIENT
Start: 2025-03-12

## 2025-03-12 RX ORDER — METHYLPREDNISOLONE 4 MG/1
TABLET ORAL
Qty: 21 TABLET | Refills: 0 | Status: SHIPPED | OUTPATIENT
Start: 2025-03-12

## 2025-03-12 NOTE — PROGRESS NOTES
Patient Name: Iliana Barriga  : 1955   MRN: [id number]     Chief Complaint:    Chief Complaint   Patient presents with    Rash     On arms and legs         History of Present Illness: Iliana Barriga is a 69 y.o. female.  History of Present Illness  The patient is a 69-year-old female who presents for evaluation of a rash.    Rash  - Reports the presence of pruritic, erythematous lesions on her skin  - Scratching has led to areas of scabbing  - Initially presented with pustules  - No systemic symptoms such as fever  - No known exposure to sick contacts  - Granddaughter has a few similar spots on her body  - Concerned about potential exposure to unwashed clothing from a [employer] store  - No changes in personal care products including soaps, shampoos, lotions, detergent sheets, or unwashed clothing  - Has not started any new medications or over-the-counter drugs  - Diet remains unchanged  - Recalls a similar rash in , suspected to be scabies  - Plans to evaluate her home for bedbugs  - Will keep her scheduled follow-up appointment  - Previously attempted treatment with Flagyl cream, which exacerbated the symptoms    Rosacea  - Has shown improvement with the use of Flagyl    Supplemental information: None    MEDICATIONS  Current: Flagyl cream         Subjective     Review of System: Review of Systems     Medications:     Current Outpatient Medications:     acyclovir (ZOVIRAX) 5 % ointment, Apply 1 application topically to the appropriate area as directed 4 (Four) Times a Day As Needed (fever blister)., Disp: 30 g, Rfl: 1    albuterol sulfate  (90 Base) MCG/ACT inhaler, Inhale 2 puffs Every 6 (Six) Hours As Needed for Wheezing or Shortness of Air (cough)., Disp: 18 g, Rfl: 3    ammonium lactate (AmLactin) 12 % lotion, Apply  topically to the appropriate area as directed As Needed for Dry Skin., Disp: 396 g, Rfl: 0    Atrovent HFA 17 MCG/ACT inhaler, INHALE 1 PUFF TWICE A DAY, Disp:  12.9 each, Rfl: 3    azelastine (ASTELIN) 0.1 % nasal spray, 2 sprays into the nostril(s) as directed by provider 2 (Two) Times a Day., Disp: 90 mL, Rfl: 3    [employer] D3 1000 units capsule, Take  by mouth Daily., Disp: , Rfl: 1    cyclobenzaprine (FLEXERIL) 5 MG tablet, Take 1 tablet by mouth Every 8 (Eight) Hours., Disp: 30 tablet, Rfl: 1    linaclotide (Linzess) 72 MCG capsule capsule, Take 1 capsule by mouth Every Morning Before Breakfast., Disp: 30 capsule, Rfl: 5    Magnesium Oxide 500 MG tablet, Take  by mouth Daily., Disp: , Rfl:     methylPREDNISolone (MEDROL) 4 MG dose pack, Take as directed on package instructions., Disp: 21 tablet, Rfl: 0    metroNIDAZOLE (METROGEL) 0.75 % gel, APPLY TOPICALLY TO THE APPROPRIATE AREA AS DIRECTED 2 (TWO) TIMES A DAY., Disp: 45 g, Rfl: 0    Multiple Vitamins-Minerals (MULTIVITAMIN ADULT EXTRA C PO), Take 1 tablet by mouth Daily., Disp: , Rfl:     omeprazole (priLOSEC) 20 MG capsule, TAKE 1 CAPSULE BY MOUTH EVERY DAY, Disp: 90 capsule, Rfl: 1    permethrin (ELIMITE) 5 % cream, Apply 1 Application topically to the appropriate area as directed 1 (One) Time Per Week., Disp: 60 g, Rfl: 1    Premarin 0.625 MG tablet, TAKE 1 TABLET BY MOUTH EVERY DAY, Disp: 90 tablet, Rfl: 1    Synthroid 25 MCG tablet, TAKE 1 TABLET BY MOUTH EVERY DAY, Disp: 87 tablet, Rfl: 1    tretinoin (RETIN-A) 0.025 % cream, APPLY 1 APPLICATION TOPICALLY TO THE APPROPRIATE AREA AS DIRECTED EVERY NIGHT., Disp: 45 g, Rfl: 3    triamterene-hydrochlorothiazide (DYAZIDE) 37.5-25 MG per capsule, TAKE 1 CAPSULE BY MOUTH EVERY DAY IN THE MORNING, Disp: 90 capsule, Rfl: 0    Allergies:   Allergies   Allergen Reactions    Sulfamethoxazole-Trimethoprim Itching, Swelling and Hives     Throat felt tight and arm swelled  Throat felt tight and arm swelled      Crestor [Rosuvastatin] Unknown - High Severity     Incapacity     Penicillins Hives    Regadenoson Hives     Rash after stress test.     Technetium Tc 99m Sestamibi Hives  "      Objective     Physical Exam:   Vital Signs:   Vitals:    03/12/25 1054   BP: 118/66   BP Location: Right arm   Patient Position: Sitting   Cuff Size: Adult   Pulse: 68   Resp: 18   Temp: 97.1 °F (36.2 °C)   TempSrc: Infrared   Weight: 78.5 kg (173 lb)   Height: 158.8 cm (62.5\")   PainSc: 0-No pain           Physical Exam  Physical Exam  Lungs are clear.  Heart rate is regular.  Mood is normal.       Results       Assessment / Plan      Assessment/Plan:   Diagnoses and all orders for this visit:    1. Rash (Primary)  -     methylPREDNISolone (MEDROL) 4 MG dose pack; Take as directed on package instructions.  Dispense: 21 tablet; Refill: 0  -     permethrin (ELIMITE) 5 % cream; Apply 1 Application topically to the appropriate area as directed 1 (One) Time Per Week.  Dispense: 60 g; Refill: 1       Assessment & Plan  1. Rash  - Itchy, red rash with scratching and scabbing, initially showing pustules  - No fever or sick contacts  - Granddaughter had similar spots, possibly linked to unwashed clothing from a consignment store  - No changes in personal care products, medications, or diet  - Similar rash in 2010, suspected to be scabies    2. Rosacea  - Improved with the use of Flagyl cream       Explained and discussed patient's condition and plan of care including labs and radiology that may be ordered.  Discussed when to follow-up.  Discussed possible red flags and how to follow-up with those.  Viewed patient's medications and discussed common side effects and symptoms to report. Patient to continue current medications as advised.  Be compliant with medications. Patient to call clinic if they have any worsening, no improvement, does not tolerate medication, or any future concerns about treatment.  Questions and concerns addressed at this appointment.  Patient to report to ER for emergencies.  Patient verbalized an understanding and agreement with plan of care.            CECILY Aguila " [facility]

## 2025-04-22 DIAGNOSIS — I10 PRIMARY HYPERTENSION: ICD-10-CM

## 2025-04-22 DIAGNOSIS — K21.9 GASTROESOPHAGEAL REFLUX DISEASE WITHOUT ESOPHAGITIS: ICD-10-CM

## 2025-04-22 RX ORDER — OMEPRAZOLE 20 MG/1
20 CAPSULE, DELAYED RELEASE ORAL DAILY
Qty: 90 CAPSULE | Refills: 1 | Status: SHIPPED | OUTPATIENT
Start: 2025-04-22

## 2025-04-22 RX ORDER — TRIAMTERENE AND HYDROCHLOROTHIAZIDE 37.5; 25 MG/1; MG/1
1 CAPSULE ORAL EVERY MORNING
Qty: 90 CAPSULE | Refills: 0 | Status: SHIPPED | OUTPATIENT
Start: 2025-04-22

## 2025-05-22 DIAGNOSIS — J30.9 ALLERGIC RHINITIS, UNSPECIFIED SEASONALITY, UNSPECIFIED TRIGGER: ICD-10-CM

## 2025-05-22 RX ORDER — AZELASTINE HYDROCHLORIDE 137 UG/1
SPRAY, METERED NASAL
Qty: 90 ML | Refills: 0 | Status: SHIPPED | OUTPATIENT
Start: 2025-05-22

## 2025-06-19 DIAGNOSIS — E03.9 HYPOTHYROIDISM, UNSPECIFIED TYPE: ICD-10-CM

## 2025-06-19 DIAGNOSIS — I10 PRIMARY HYPERTENSION: ICD-10-CM

## 2025-06-20 RX ORDER — TRIAMTERENE AND HYDROCHLOROTHIAZIDE 37.5; 25 MG/1; MG/1
1 CAPSULE ORAL EVERY MORNING
Qty: 90 CAPSULE | Refills: 0 | Status: SHIPPED | OUTPATIENT
Start: 2025-06-20

## 2025-06-20 RX ORDER — LEVOTHYROXINE SODIUM 25 MCG
25 TABLET ORAL DAILY
Qty: 87 TABLET | Refills: 1 | Status: SHIPPED | OUTPATIENT
Start: 2025-06-20

## 2025-07-17 ENCOUNTER — TELEPHONE (OUTPATIENT)
Dept: INTERNAL MEDICINE | Facility: CLINIC | Age: 70
End: 2025-07-17
Payer: MEDICARE

## 2025-07-17 DIAGNOSIS — R30.0 DYSURIA: Primary | ICD-10-CM

## 2025-07-17 NOTE — TELEPHONE ENCOUNTER
I have placed an order for urinalysis.  If there is an opening please put her on my schedule.  If not she can come by the office and give us a sample and I will send in an antibiotic.

## 2025-07-17 NOTE — TELEPHONE ENCOUNTER
Patient has been having UTI symptoms for over a month and it is getting worse for patient. Its the Burning sensation while urinating that is unbearable.  Patient wants to see Katheryn and I didn't see any appointments for today to get patient in. Patient wants to come in to provide a urine sample to see if its a UTI. Patient called in the office today 7/17/25.

## 2025-07-18 ENCOUNTER — OFFICE VISIT (OUTPATIENT)
Dept: INTERNAL MEDICINE | Facility: CLINIC | Age: 70
End: 2025-07-18
Payer: MEDICARE

## 2025-07-18 VITALS
SYSTOLIC BLOOD PRESSURE: 112 MMHG | BODY MASS INDEX: 29.84 KG/M2 | RESPIRATION RATE: 10 BRPM | DIASTOLIC BLOOD PRESSURE: 82 MMHG | HEART RATE: 82 BPM | WEIGHT: 165.8 LBS | TEMPERATURE: 97.8 F

## 2025-07-18 DIAGNOSIS — N30.00 ACUTE CYSTITIS WITHOUT HEMATURIA: Primary | ICD-10-CM

## 2025-07-18 DIAGNOSIS — R30.0 DYSURIA: ICD-10-CM

## 2025-07-18 DIAGNOSIS — R82.998 LEUKOCYTES IN URINE: ICD-10-CM

## 2025-07-18 DIAGNOSIS — R31.9 HEMATURIA, UNSPECIFIED TYPE: ICD-10-CM

## 2025-07-18 DIAGNOSIS — R11.0 NAUSEA: ICD-10-CM

## 2025-07-18 LAB
BACTERIA UR QL AUTO: ABNORMAL /HPF
BILIRUB BLD-MCNC: NEGATIVE MG/DL
CLARITY, POC: ABNORMAL
COLOR UR: YELLOW
EXPIRATION DATE: ABNORMAL
GLUCOSE UR STRIP-MCNC: NEGATIVE MG/DL
HYALINE CASTS UR QL AUTO: ABNORMAL /LPF
KETONES UR QL: NEGATIVE
LEUKOCYTE EST, POC: NEGATIVE
Lab: ABNORMAL
NITRITE UR-MCNC: NEGATIVE MG/ML
PH UR: 6 [PH] (ref 5–8)
PROT UR STRIP-MCNC: NEGATIVE MG/DL
RBC # UR STRIP: ABNORMAL /HPF
RBC # UR STRIP: ABNORMAL /UL
REF LAB TEST METHOD: ABNORMAL
SP GR UR: 1.02 (ref 1–1.03)
SQUAMOUS #/AREA URNS HPF: ABNORMAL /HPF
UROBILINOGEN UR QL: NORMAL
WBC # UR STRIP: ABNORMAL /HPF

## 2025-07-18 PROCEDURE — 87186 SC STD MICRODIL/AGAR DIL: CPT | Performed by: NURSE PRACTITIONER

## 2025-07-18 PROCEDURE — 81015 MICROSCOPIC EXAM OF URINE: CPT | Performed by: NURSE PRACTITIONER

## 2025-07-18 PROCEDURE — 87088 URINE BACTERIA CULTURE: CPT | Performed by: NURSE PRACTITIONER

## 2025-07-18 PROCEDURE — 87086 URINE CULTURE/COLONY COUNT: CPT | Performed by: NURSE PRACTITIONER

## 2025-07-18 RX ORDER — ONDANSETRON 4 MG/1
4 TABLET, ORALLY DISINTEGRATING ORAL EVERY 8 HOURS PRN
Qty: 30 TABLET | Refills: 0 | Status: SHIPPED | OUTPATIENT
Start: 2025-07-18

## 2025-07-18 RX ORDER — CEFDINIR 300 MG/1
300 CAPSULE ORAL 2 TIMES DAILY
Qty: 14 CAPSULE | Refills: 0 | Status: SHIPPED | OUTPATIENT
Start: 2025-07-18

## 2025-07-18 NOTE — TELEPHONE ENCOUNTER
Name: Iliana Barriga      Relationship: Self      Best Callback Number: [phone]       HUB PROVIDED THE RELAY MESSAGE FROM THE OFFICE      PATIENT: VOICED UNDERSTANDING AND HAS NO FURTHER QUESTIONS AT THIS TIME    ADDITIONAL INFORMATION: PATIENT STATES THAT SHE CAME IN THIS MORNING

## 2025-07-18 NOTE — TELEPHONE ENCOUNTER
Relay     Called patient unable to LVM. Called to get patient on schedule and if unable then to let her know that there is an order for a urinalysis that she can come back and perform and then send in an antibiotic.

## 2025-07-20 LAB — BACTERIA SPEC AEROBE CULT: ABNORMAL

## 2025-07-20 NOTE — PROGRESS NOTES
Follow Up Office Visit      Date: 2025   Patient Name: Iliana Barriga  : 1955   MRN: [id number]     Chief Complaint:    Chief Complaint   Patient presents with    Urinary Tract Infection       History of Present Illness: Iliana Brariga is a 70 y.o. female who is here today to follow up.    HPI  History of Present Illness  The patient presents for evaluation of a urinary tract infection (UTI).    Urinary Tract Infection (UTI)  - She has been experiencing symptoms of a UTI for an extended period.  - In May 2025, she sought treatment at an urgent care clinic where she was prescribed an antibiotic due to concerns about a potential kidney infection.  - The medication induced severe nightmares, causing her to discontinue its use after 5 days.  - She reports feeling fatigued and disoriented.  - She is not experiencing any flank pain, fevers, chills, burning sensation during urination, or blood in her urine.  - She reports feelings of nausea and discomfort.  - She declines any medication for pain management.    Left Ear Discomfort  - She reports discomfort in her left ear, which she attributes to allergies.  - She does not have any sinus pain or tenderness but notes some nasal drainage.  - She is not interested in taking any medication for nausea at this time.    She is scheduled to travel on  and will be away for a week.        Subjective      Review of Systems:   Review of Systems    I have reviewed the patients family history, social history, past medical history, past surgical history and have updated it as appropriate.     Medications:     Current Outpatient Medications:     acyclovir (ZOVIRAX) 5 % ointment, Apply 1 application topically to the appropriate area as directed 4 (Four) Times a Day As Needed (fever blister)., Disp: 30 g, Rfl: 1    albuterol sulfate  (90 Base) MCG/ACT inhaler, Inhale 2 puffs Every 6 (Six) Hours As Needed for Wheezing or Shortness of Air (cough).,  Disp: 18 g, Rfl: 3    ammonium lactate (AmLactin) 12 % lotion, Apply  topically to the appropriate area as directed As Needed for Dry Skin., Disp: 396 g, Rfl: 0    Atrovent HFA 17 MCG/ACT inhaler, INHALE 1 PUFF TWICE A DAY, Disp: 12.9 each, Rfl: 3    Azelastine HCl 137 MCG/SPRAY solution, SPRAY 2 SPRAYS INTO INTO EACH NOSTRIL TWICE A DAY AS DIRECTED BY PROVIDER, Disp: 90 mL, Rfl: 0    [employer]S D3 1000 units capsule, Take  by mouth Daily., Disp: , Rfl: 1    cyclobenzaprine (FLEXERIL) 5 MG tablet, Take 1 tablet by mouth Every 8 (Eight) Hours., Disp: 30 tablet, Rfl: 1    linaclotide (Linzess) 72 MCG capsule capsule, Take 1 capsule by mouth Every Morning Before Breakfast., Disp: 30 capsule, Rfl: 5    Magnesium Oxide 500 MG tablet, Take  by mouth Daily., Disp: , Rfl:     methylPREDNISolone (MEDROL) 4 MG dose pack, Take as directed on package instructions., Disp: 21 tablet, Rfl: 0    metroNIDAZOLE (METROGEL) 0.75 % gel, APPLY TOPICALLY TO THE APPROPRIATE AREA AS DIRECTED 2 (TWO) TIMES A DAY., Disp: 45 g, Rfl: 0    Multiple Vitamins-Minerals (MULTIVITAMIN ADULT EXTRA C PO), Take 1 tablet by mouth Daily., Disp: , Rfl:     omeprazole (priLOSEC) 20 MG capsule, TAKE 1 CAPSULE BY MOUTH EVERY DAY, Disp: 90 capsule, Rfl: 1    permethrin (ELIMITE) 5 % cream, Apply 1 Application topically to the appropriate area as directed 1 (One) Time Per Week., Disp: 60 g, Rfl: 1    Premarin 0.625 MG tablet, TAKE 1 TABLET BY MOUTH EVERY DAY, Disp: 90 tablet, Rfl: 1    Synthroid 25 MCG tablet, TAKE 1 TABLET BY MOUTH EVERY DAY, Disp: 87 tablet, Rfl: 1    tretinoin (RETIN-A) 0.025 % cream, APPLY 1 APPLICATION TOPICALLY TO THE APPROPRIATE AREA AS DIRECTED EVERY NIGHT., Disp: 45 g, Rfl: 3    triamterene-hydrochlorothiazide (DYAZIDE) 37.5-25 MG per capsule, TAKE 1 CAPSULE BY MOUTH EVERY DAY IN THE MORNING, Disp: 90 capsule, Rfl: 0    cefdinir (OMNICEF) 300 MG capsule, Take 1 capsule by mouth 2 (Two) Times a Day., Disp: 14 capsule, Rfl: 0    ondansetron ODT  (ZOFRAN-ODT) 4 MG disintegrating tablet, Place 1 tablet on the tongue Every 8 (Eight) Hours As Needed for Nausea or Vomiting., Disp: 30 tablet, Rfl: 0    Allergies:   Allergies   Allergen Reactions    Sulfamethoxazole-Trimethoprim Itching, Swelling and Hives     Throat felt tight and arm swelled  Throat felt tight and arm swelled      Crestor [Rosuvastatin] Unknown - High Severity     Incapacity     Penicillins Hives    Regadenoson Hives     Rash after stress test.     Technetium Tc 99m Sestamibi Hives       Objective     Physical Exam: Please see above  Vital Signs:   Vitals:    07/18/25 0923   BP: 112/82   BP Location: Right arm   Patient Position: Sitting   Cuff Size: Adult   Pulse: 82   Resp: 10   Temp: 97.8 °F (36.6 °C)   TempSrc: Infrared   Weight: 75.2 kg (165 lb 12.8 oz)         Physical Exam  Physical Exam  Ears: Bilateral tympanic membranes are dull.  Nose: No sinus tenderness.  Respiratory: Lungs are clear and equal.  Cardiovascular: Heart rate is regular.  Gastrointestinal: No abdominal tenderness.  Genitourinary: No CVA tenderness.      Procedures    Results:   Labs:   Hemoglobin A1C   Date Value Ref Range Status   07/13/2021 5.8 (H) <5.7 % Final     TSH   Date Value Ref Range Status   12/04/2024 2.700 0.270 - 4.200 uIU/mL Final        Imaging:   No valid procedures specified.     Assessment / Plan      Assessment/Plan:   Problem List Items Addressed This Visit    None  Visit Diagnoses         Acute cystitis without hematuria    -  Primary    Relevant Medications    cefdinir (OMNICEF) 300 MG capsule      Dysuria          Leukocytes in urine        Relevant Orders    Urine Culture - Urine, Urine, Clean Catch (Completed)      Hematuria, unspecified type        Relevant Orders    Urinalysis, Microscopic Only - Urine, Clean Catch (Completed)      Nausea        Relevant Medications    ondansetron ODT (ZOFRAN-ODT) 4 MG disintegrating tablet              Assessment & Plan  1. Urinary Tract Infection (UTI)  -  Symptoms include fatigue, nausea, and discomfort  - Previous experience of vivid nightmares with an antibiotic taken in 05/2025  - Cefdinir  has been tolerated in the past  - Urine culture will be sent for further analysis  - Prescription for cefdinir 300 mg provided  - If no improvement by Monday, contact office to review culture results and potentially adjust treatment plan    2. Nausea  - Patient reports feeling nauseated  - No sinus tenderness noted; TMs are dull bilaterally  - History of tolerating Zofran well  - Prescription for Zofran 4 mg provided to manage nausea during upcoming trip        Follow Up:   Return for Next scheduled follow up.    Patient or patient representative verbalized consent for the use of Ambient Listening during the visit with  CECILY Aguila for chart documentation. 7/19/2025  23:17 EDT    CECILY Aguila  [facility]